# Patient Record
Sex: MALE | Race: WHITE | NOT HISPANIC OR LATINO
[De-identification: names, ages, dates, MRNs, and addresses within clinical notes are randomized per-mention and may not be internally consistent; named-entity substitution may affect disease eponyms.]

---

## 2017-02-27 ENCOUNTER — RX RENEWAL (OUTPATIENT)
Age: 82
End: 2017-02-27

## 2017-05-12 ENCOUNTER — RX RENEWAL (OUTPATIENT)
Age: 82
End: 2017-05-12

## 2017-07-17 ENCOUNTER — LABORATORY RESULT (OUTPATIENT)
Age: 82
End: 2017-07-17

## 2017-07-17 ENCOUNTER — APPOINTMENT (OUTPATIENT)
Dept: CARDIOLOGY | Facility: CLINIC | Age: 82
End: 2017-07-17

## 2017-07-17 VITALS
HEART RATE: 78 BPM | DIASTOLIC BLOOD PRESSURE: 80 MMHG | WEIGHT: 145 LBS | BODY MASS INDEX: 22.71 KG/M2 | SYSTOLIC BLOOD PRESSURE: 142 MMHG | OXYGEN SATURATION: 98 %

## 2017-07-18 ENCOUNTER — NON-APPOINTMENT (OUTPATIENT)
Age: 82
End: 2017-07-18

## 2017-07-23 LAB
ALBUMIN SERPL ELPH-MCNC: 3.7 G/DL
ALP BLD-CCNC: 86 U/L
ALT SERPL-CCNC: 17 U/L
ANION GAP SERPL CALC-SCNC: 18 MMOL/L
AST SERPL-CCNC: 36 U/L
BASOPHILS # BLD AUTO: 0.03 K/UL
BASOPHILS NFR BLD AUTO: 0.5 %
BILIRUB SERPL-MCNC: 1.7 MG/DL
BUN SERPL-MCNC: 15 MG/DL
CALCIUM SERPL-MCNC: 9.6 MG/DL
CHLORIDE SERPL-SCNC: 95 MMOL/L
CO2 SERPL-SCNC: 25 MMOL/L
CREAT SERPL-MCNC: 0.97 MG/DL
EOSINOPHIL # BLD AUTO: 0.05 K/UL
EOSINOPHIL NFR BLD AUTO: 0.8 %
FT4I SERPL CALC-MCNC: 7.7 INDEX
GLUCOSE SERPL-MCNC: 52 MG/DL
HBA1C MFR BLD HPLC: 5.6 %
HCT VFR BLD CALC: 41.1 %
HGB BLD-MCNC: 12.8 G/DL
IMM GRANULOCYTES NFR BLD AUTO: 1.3 %
LYMPHOCYTES # BLD AUTO: 0.56 K/UL
LYMPHOCYTES NFR BLD AUTO: 9.3 %
MAN DIFF?: NORMAL
MCHC RBC-ENTMCNC: 29.1 PG
MCHC RBC-ENTMCNC: 31.1 GM/DL
MCV RBC AUTO: 93.4 FL
MONOCYTES # BLD AUTO: 0.6 K/UL
MONOCYTES NFR BLD AUTO: 9.9 %
NEUTROPHILS # BLD AUTO: 4.72 K/UL
NEUTROPHILS NFR BLD AUTO: 78.2 %
NT-PROBNP SERPL-MCNC: 1329 PG/ML
PLATELET # BLD AUTO: 96 K/UL
POTASSIUM SERPL-SCNC: 4.3 MMOL/L
PROT SERPL-MCNC: 6 G/DL
RBC # BLD: 4.4 M/UL
RBC # FLD: 15.4 %
SODIUM SERPL-SCNC: 138 MMOL/L
T3RU NFR SERPL: 0.97 INDEX
T4 FREE SERPL-MCNC: 1.4 NG/DL
T4 SERPL-MCNC: 7.5 UG/DL
TSH SERPL-ACNC: 5.7 UIU/ML
WBC # FLD AUTO: 6.04 K/UL

## 2017-08-02 ENCOUNTER — RX RENEWAL (OUTPATIENT)
Age: 82
End: 2017-08-02

## 2017-08-06 ENCOUNTER — RX RENEWAL (OUTPATIENT)
Age: 82
End: 2017-08-06

## 2017-08-07 ENCOUNTER — RX RENEWAL (OUTPATIENT)
Age: 82
End: 2017-08-07

## 2017-08-22 ENCOUNTER — MEDICATION RENEWAL (OUTPATIENT)
Age: 82
End: 2017-08-22

## 2017-08-25 ENCOUNTER — MEDICATION RENEWAL (OUTPATIENT)
Age: 82
End: 2017-08-25

## 2017-08-31 ENCOUNTER — APPOINTMENT (OUTPATIENT)
Dept: UROLOGY | Facility: CLINIC | Age: 82
End: 2017-08-31
Payer: MEDICARE

## 2017-08-31 PROCEDURE — 99214 OFFICE O/P EST MOD 30 MIN: CPT

## 2017-09-01 LAB
APPEARANCE: CLEAR
BACTERIA: NEGATIVE
BILIRUBIN URINE: NEGATIVE
BLOOD URINE: ABNORMAL
COLOR: YELLOW
GLUCOSE QUALITATIVE U: NORMAL MG/DL
HYALINE CASTS: 1 /LPF
KETONES URINE: NEGATIVE
LEUKOCYTE ESTERASE URINE: NEGATIVE
MICROSCOPIC-UA: NORMAL
NITRITE URINE: NEGATIVE
PH URINE: 6.5
PROTEIN URINE: 30 MG/DL
PSA FREE FLD-MCNC: 33.3
PSA FREE SERPL-MCNC: 0.03 NG/ML
PSA SERPL-MCNC: 0.09 NG/ML
RED BLOOD CELLS URINE: 6 /HPF
SPECIFIC GRAVITY URINE: 1.02
SQUAMOUS EPITHELIAL CELLS: 0 /HPF
UROBILINOGEN URINE: 1 MG/DL
WHITE BLOOD CELLS URINE: 1 /HPF

## 2017-09-05 LAB — CORE LAB FLUID CYTOLOGY: NORMAL

## 2017-11-01 ENCOUNTER — RX RENEWAL (OUTPATIENT)
Age: 82
End: 2017-11-01

## 2017-11-11 ENCOUNTER — RX RENEWAL (OUTPATIENT)
Age: 82
End: 2017-11-11

## 2017-11-12 ENCOUNTER — RX RENEWAL (OUTPATIENT)
Age: 82
End: 2017-11-12

## 2017-11-20 ENCOUNTER — APPOINTMENT (OUTPATIENT)
Dept: CARDIOLOGY | Facility: CLINIC | Age: 82
End: 2017-11-20
Payer: MEDICARE

## 2017-11-20 ENCOUNTER — NON-APPOINTMENT (OUTPATIENT)
Age: 82
End: 2017-11-20

## 2017-11-20 VITALS
DIASTOLIC BLOOD PRESSURE: 90 MMHG | WEIGHT: 141 LBS | HEART RATE: 91 BPM | TEMPERATURE: 97.6 F | HEIGHT: 67 IN | BODY MASS INDEX: 22.13 KG/M2 | OXYGEN SATURATION: 92 % | SYSTOLIC BLOOD PRESSURE: 155 MMHG

## 2017-11-20 DIAGNOSIS — R26.0 ATAXIC GAIT: ICD-10-CM

## 2017-11-20 PROCEDURE — 99215 OFFICE O/P EST HI 40 MIN: CPT

## 2017-11-20 PROCEDURE — 93288 INTERROG EVL PM/LDLS PM IP: CPT

## 2017-12-05 ENCOUNTER — MEDICATION RENEWAL (OUTPATIENT)
Age: 82
End: 2017-12-05

## 2018-02-21 ENCOUNTER — RX RENEWAL (OUTPATIENT)
Age: 83
End: 2018-02-21

## 2018-04-11 ENCOUNTER — RX RENEWAL (OUTPATIENT)
Age: 83
End: 2018-04-11

## 2018-04-20 ENCOUNTER — APPOINTMENT (OUTPATIENT)
Dept: CARDIOLOGY | Facility: CLINIC | Age: 83
End: 2018-04-20

## 2018-06-03 ENCOUNTER — RX RENEWAL (OUTPATIENT)
Age: 83
End: 2018-06-03

## 2018-07-09 ENCOUNTER — MOBILE ON CALL (OUTPATIENT)
Age: 83
End: 2018-07-09

## 2018-08-02 ENCOUNTER — APPOINTMENT (OUTPATIENT)
Dept: CARDIOLOGY | Facility: CLINIC | Age: 83
End: 2018-08-02
Payer: MEDICARE

## 2018-08-02 ENCOUNTER — NON-APPOINTMENT (OUTPATIENT)
Age: 83
End: 2018-08-02

## 2018-08-02 VITALS — DIASTOLIC BLOOD PRESSURE: 80 MMHG | WEIGHT: 147 LBS | SYSTOLIC BLOOD PRESSURE: 120 MMHG | BODY MASS INDEX: 23.02 KG/M2

## 2018-08-02 DIAGNOSIS — M10.9 GOUT, UNSPECIFIED: ICD-10-CM

## 2018-08-02 DIAGNOSIS — R60.0 LOCALIZED EDEMA: ICD-10-CM

## 2018-08-02 PROCEDURE — 93000 ELECTROCARDIOGRAM COMPLETE: CPT | Mod: 59

## 2018-08-02 PROCEDURE — 93288 INTERROG EVL PM/LDLS PM IP: CPT

## 2018-08-02 PROCEDURE — 99215 OFFICE O/P EST HI 40 MIN: CPT

## 2018-08-02 RX ORDER — SACUBITRIL AND VALSARTAN 24; 26 MG/1; MG/1
24-26 TABLET, FILM COATED ORAL
Qty: 180 | Refills: 0 | Status: DISCONTINUED | COMMUNITY
Start: 2018-07-09 | End: 2018-08-02

## 2018-08-14 ENCOUNTER — APPOINTMENT (OUTPATIENT)
Dept: CARDIOLOGY | Facility: CLINIC | Age: 83
End: 2018-08-14
Payer: MEDICARE

## 2018-08-14 VITALS — DIASTOLIC BLOOD PRESSURE: 83 MMHG | SYSTOLIC BLOOD PRESSURE: 137 MMHG | OXYGEN SATURATION: 98 % | HEART RATE: 82 BPM

## 2018-08-14 LAB — INR PPP: 3.2 RATIO

## 2018-08-14 PROCEDURE — 99211 OFF/OP EST MAY X REQ PHY/QHP: CPT

## 2018-08-14 PROCEDURE — 85610 PROTHROMBIN TIME: CPT | Mod: QW

## 2018-08-21 ENCOUNTER — RX RENEWAL (OUTPATIENT)
Age: 83
End: 2018-08-21

## 2018-09-18 ENCOUNTER — APPOINTMENT (OUTPATIENT)
Dept: CARDIOLOGY | Facility: CLINIC | Age: 83
End: 2018-09-18
Payer: MEDICARE

## 2018-09-18 ENCOUNTER — NON-APPOINTMENT (OUTPATIENT)
Age: 83
End: 2018-09-18

## 2018-09-18 VITALS
HEIGHT: 67 IN | TEMPERATURE: 98.3 F | HEART RATE: 85 BPM | WEIGHT: 145.2 LBS | OXYGEN SATURATION: 93 % | DIASTOLIC BLOOD PRESSURE: 83 MMHG | SYSTOLIC BLOOD PRESSURE: 138 MMHG | BODY MASS INDEX: 22.79 KG/M2

## 2018-09-18 DIAGNOSIS — I87.2 VENOUS INSUFFICIENCY (CHRONIC) (PERIPHERAL): ICD-10-CM

## 2018-09-18 PROCEDURE — 99215 OFFICE O/P EST HI 40 MIN: CPT

## 2018-09-18 PROCEDURE — 93000 ELECTROCARDIOGRAM COMPLETE: CPT

## 2018-09-27 ENCOUNTER — LABORATORY RESULT (OUTPATIENT)
Age: 83
End: 2018-09-27

## 2018-09-27 ENCOUNTER — APPOINTMENT (OUTPATIENT)
Dept: CARDIOLOGY | Facility: CLINIC | Age: 83
End: 2018-09-27
Payer: MEDICARE

## 2018-09-27 DIAGNOSIS — M25.511 PAIN IN RIGHT SHOULDER: ICD-10-CM

## 2018-09-27 PROCEDURE — 93306 TTE W/DOPPLER COMPLETE: CPT

## 2018-09-28 LAB
ALBUMIN SERPL ELPH-MCNC: 4.3 G/DL
ALP BLD-CCNC: 108 U/L
ALT SERPL-CCNC: 19 U/L
ANION GAP SERPL CALC-SCNC: 15 MMOL/L
AST SERPL-CCNC: 25 U/L
BASOPHILS # BLD AUTO: 0.02 K/UL
BASOPHILS NFR BLD AUTO: 0.3 %
BILIRUB SERPL-MCNC: 3.9 MG/DL
BUN SERPL-MCNC: 22 MG/DL
CALCIUM SERPL-MCNC: 9.6 MG/DL
CHLORIDE SERPL-SCNC: 98 MMOL/L
CHOLEST SERPL-MCNC: 138 MG/DL
CHOLEST/HDLC SERPL: 2.5 RATIO
CO2 SERPL-SCNC: 26 MMOL/L
CREAT SERPL-MCNC: 0.99 MG/DL
EOSINOPHIL # BLD AUTO: 0.03 K/UL
EOSINOPHIL NFR BLD AUTO: 0.4 %
FT4I SERPL CALC-MCNC: 8.3 INDEX
GLUCOSE SERPL-MCNC: 106 MG/DL
HBA1C MFR BLD HPLC: 5.8 %
HCT VFR BLD CALC: 42.6 %
HDLC SERPL-MCNC: 56 MG/DL
HGB BLD-MCNC: 13.4 G/DL
IMM GRANULOCYTES NFR BLD AUTO: 1.3 %
LDLC SERPL CALC-MCNC: 63 MG/DL
LYMPHOCYTES # BLD AUTO: 1.01 K/UL
LYMPHOCYTES NFR BLD AUTO: 14.1 %
MAN DIFF?: NORMAL
MCHC RBC-ENTMCNC: 29.5 PG
MCHC RBC-ENTMCNC: 31.5 GM/DL
MCV RBC AUTO: 93.6 FL
MONOCYTES # BLD AUTO: 0.58 K/UL
MONOCYTES NFR BLD AUTO: 8.1 %
NEUTROPHILS # BLD AUTO: 5.44 K/UL
NEUTROPHILS NFR BLD AUTO: 75.8 %
PLATELET # BLD AUTO: 82 K/UL
POTASSIUM SERPL-SCNC: 4.7 MMOL/L
PROT SERPL-MCNC: 6.6 G/DL
RBC # BLD: 4.55 M/UL
RBC # FLD: 15.4 %
SODIUM SERPL-SCNC: 139 MMOL/L
T3RU NFR SERPL: 0.96 INDEX
T4 FREE SERPL-MCNC: 1.6 NG/DL
T4 SERPL-MCNC: 8 UG/DL
TRIGL SERPL-MCNC: 94 MG/DL
TSH SERPL-ACNC: 4.34 UIU/ML
WBC # FLD AUTO: 7.17 K/UL

## 2018-09-29 ENCOUNTER — RX RENEWAL (OUTPATIENT)
Age: 83
End: 2018-09-29

## 2018-10-02 ENCOUNTER — RX RENEWAL (OUTPATIENT)
Age: 83
End: 2018-10-02

## 2018-11-18 ENCOUNTER — RX RENEWAL (OUTPATIENT)
Age: 83
End: 2018-11-18

## 2018-11-19 ENCOUNTER — APPOINTMENT (OUTPATIENT)
Dept: UROLOGY | Facility: CLINIC | Age: 83
End: 2018-11-19
Payer: MEDICARE

## 2018-11-19 DIAGNOSIS — N40.1 BENIGN PROSTATIC HYPERPLASIA WITH LOWER URINARY TRACT SYMPMS: ICD-10-CM

## 2018-11-19 DIAGNOSIS — N13.8 BENIGN PROSTATIC HYPERPLASIA WITH LOWER URINARY TRACT SYMPMS: ICD-10-CM

## 2018-11-19 DIAGNOSIS — R35.0 FREQUENCY OF MICTURITION: ICD-10-CM

## 2018-11-19 PROCEDURE — 99213 OFFICE O/P EST LOW 20 MIN: CPT

## 2018-11-20 LAB
APPEARANCE: CLEAR
BACTERIA: NEGATIVE
BILIRUBIN URINE: NEGATIVE
BLOOD URINE: ABNORMAL
COLOR: YELLOW
GLUCOSE QUALITATIVE U: NEGATIVE MG/DL
HYALINE CASTS: 0 /LPF
KETONES URINE: NEGATIVE
LEUKOCYTE ESTERASE URINE: NEGATIVE
MICROSCOPIC-UA: NORMAL
NITRITE URINE: NEGATIVE
PH URINE: 7
PROTEIN URINE: 30 MG/DL
RED BLOOD CELLS URINE: 4 /HPF
SPECIFIC GRAVITY URINE: 1.01
SQUAMOUS EPITHELIAL CELLS: 0 /HPF
UROBILINOGEN URINE: NEGATIVE MG/DL
WHITE BLOOD CELLS URINE: 0 /HPF

## 2018-11-21 ENCOUNTER — APPOINTMENT (OUTPATIENT)
Dept: CARDIOLOGY | Facility: CLINIC | Age: 83
End: 2018-11-21
Payer: MEDICARE

## 2018-11-21 ENCOUNTER — NON-APPOINTMENT (OUTPATIENT)
Age: 83
End: 2018-11-21

## 2018-11-21 VITALS
DIASTOLIC BLOOD PRESSURE: 70 MMHG | SYSTOLIC BLOOD PRESSURE: 116 MMHG | WEIGHT: 142 LBS | HEART RATE: 90 BPM | HEIGHT: 67 IN | BODY MASS INDEX: 22.29 KG/M2 | OXYGEN SATURATION: 93 %

## 2018-11-21 DIAGNOSIS — D69.6 THROMBOCYTOPENIA, UNSPECIFIED: ICD-10-CM

## 2018-11-21 LAB
CORE LAB FLUID CYTOLOGY: NORMAL
INR PPP: 2.4 RATIO

## 2018-11-21 PROCEDURE — 99215 OFFICE O/P EST HI 40 MIN: CPT

## 2018-11-21 PROCEDURE — 93000 ELECTROCARDIOGRAM COMPLETE: CPT

## 2018-12-13 ENCOUNTER — RX RENEWAL (OUTPATIENT)
Age: 83
End: 2018-12-13

## 2019-01-15 ENCOUNTER — RX RENEWAL (OUTPATIENT)
Age: 84
End: 2019-01-15

## 2019-02-24 ENCOUNTER — RX RENEWAL (OUTPATIENT)
Age: 84
End: 2019-02-24

## 2019-02-27 ENCOUNTER — RX RENEWAL (OUTPATIENT)
Age: 84
End: 2019-02-27

## 2019-05-30 ENCOUNTER — RX RENEWAL (OUTPATIENT)
Age: 84
End: 2019-05-30

## 2019-07-19 ENCOUNTER — NON-APPOINTMENT (OUTPATIENT)
Age: 84
End: 2019-07-19

## 2019-07-19 ENCOUNTER — APPOINTMENT (OUTPATIENT)
Dept: CARDIOLOGY | Facility: CLINIC | Age: 84
End: 2019-07-19
Payer: MEDICARE

## 2019-07-19 VITALS
HEART RATE: 75 BPM | RESPIRATION RATE: 17 BRPM | DIASTOLIC BLOOD PRESSURE: 84 MMHG | TEMPERATURE: 97.9 F | SYSTOLIC BLOOD PRESSURE: 122 MMHG | HEIGHT: 67 IN | BODY MASS INDEX: 23.07 KG/M2 | OXYGEN SATURATION: 98 % | WEIGHT: 147 LBS

## 2019-07-19 DIAGNOSIS — J90 PLEURAL EFFUSION, NOT ELSEWHERE CLASSIFIED: ICD-10-CM

## 2019-07-19 PROCEDURE — 93000 ELECTROCARDIOGRAM COMPLETE: CPT

## 2019-07-19 PROCEDURE — 99215 OFFICE O/P EST HI 40 MIN: CPT

## 2019-07-19 RX ORDER — DIGOXIN 250 UG/1
250 TABLET ORAL
Qty: 90 | Refills: 0 | Status: DISCONTINUED | COMMUNITY
Start: 2018-06-03 | End: 2019-07-19

## 2019-07-20 ENCOUNTER — OUTPATIENT (OUTPATIENT)
Dept: OUTPATIENT SERVICES | Facility: HOSPITAL | Age: 84
LOS: 1 days | End: 2019-07-20
Payer: MEDICARE

## 2019-07-20 ENCOUNTER — APPOINTMENT (OUTPATIENT)
Dept: RADIOLOGY | Facility: CLINIC | Age: 84
End: 2019-07-20
Payer: MEDICARE

## 2019-07-20 DIAGNOSIS — Z00.8 ENCOUNTER FOR OTHER GENERAL EXAMINATION: ICD-10-CM

## 2019-07-20 PROCEDURE — 71046 X-RAY EXAM CHEST 2 VIEWS: CPT

## 2019-07-20 PROCEDURE — 71046 X-RAY EXAM CHEST 2 VIEWS: CPT | Mod: 26

## 2019-07-21 ENCOUNTER — RX RENEWAL (OUTPATIENT)
Age: 84
End: 2019-07-21

## 2019-07-21 RX ORDER — SACUBITRIL AND VALSARTAN 24; 26 MG/1; MG/1
24-26 TABLET, FILM COATED ORAL TWICE DAILY
Qty: 180 | Refills: 3 | Status: ACTIVE | COMMUNITY
Start: 2017-07-23 | End: 1900-01-01

## 2019-07-23 ENCOUNTER — APPOINTMENT (OUTPATIENT)
Dept: CARDIOLOGY | Facility: CLINIC | Age: 84
End: 2019-07-23
Payer: MEDICARE

## 2019-07-23 ENCOUNTER — APPOINTMENT (OUTPATIENT)
Dept: RADIOLOGY | Facility: CLINIC | Age: 84
End: 2019-07-23
Payer: MEDICARE

## 2019-07-23 ENCOUNTER — NON-APPOINTMENT (OUTPATIENT)
Age: 84
End: 2019-07-23

## 2019-07-23 ENCOUNTER — OUTPATIENT (OUTPATIENT)
Dept: OUTPATIENT SERVICES | Facility: HOSPITAL | Age: 84
LOS: 1 days | End: 2019-07-23
Payer: MEDICARE

## 2019-07-23 VITALS
DIASTOLIC BLOOD PRESSURE: 80 MMHG | BODY MASS INDEX: 22.91 KG/M2 | WEIGHT: 146 LBS | HEART RATE: 90 BPM | SYSTOLIC BLOOD PRESSURE: 130 MMHG | OXYGEN SATURATION: 95 % | HEIGHT: 67 IN

## 2019-07-23 DIAGNOSIS — M25.552 PAIN IN LEFT HIP: ICD-10-CM

## 2019-07-23 DIAGNOSIS — I34.0 NONRHEUMATIC MITRAL (VALVE) INSUFFICIENCY: ICD-10-CM

## 2019-07-23 PROCEDURE — 73502 X-RAY EXAM HIP UNI 2-3 VIEWS: CPT

## 2019-07-23 PROCEDURE — 99215 OFFICE O/P EST HI 40 MIN: CPT

## 2019-07-23 PROCEDURE — 93000 ELECTROCARDIOGRAM COMPLETE: CPT

## 2019-07-23 PROCEDURE — 73502 X-RAY EXAM HIP UNI 2-3 VIEWS: CPT | Mod: 26,LT

## 2019-08-02 ENCOUNTER — NON-APPOINTMENT (OUTPATIENT)
Age: 84
End: 2019-08-02

## 2019-08-02 ENCOUNTER — LABORATORY RESULT (OUTPATIENT)
Age: 84
End: 2019-08-02

## 2019-08-02 ENCOUNTER — APPOINTMENT (OUTPATIENT)
Dept: CARDIOLOGY | Facility: CLINIC | Age: 84
End: 2019-08-02
Payer: MEDICARE

## 2019-08-02 VITALS
SYSTOLIC BLOOD PRESSURE: 126 MMHG | WEIGHT: 140 LBS | BODY MASS INDEX: 21.97 KG/M2 | HEART RATE: 84 BPM | DIASTOLIC BLOOD PRESSURE: 80 MMHG | OXYGEN SATURATION: 96 % | HEIGHT: 67 IN

## 2019-08-02 DIAGNOSIS — E78.5 HYPERLIPIDEMIA, UNSPECIFIED: ICD-10-CM

## 2019-08-02 DIAGNOSIS — R97.20 ELEVATED PROSTATE, SPECIFIC ANTIGEN [PSA]: ICD-10-CM

## 2019-08-02 DIAGNOSIS — I10 ESSENTIAL (PRIMARY) HYPERTENSION: ICD-10-CM

## 2019-08-02 PROCEDURE — 99215 OFFICE O/P EST HI 40 MIN: CPT

## 2019-08-04 ENCOUNTER — MOBILE ON CALL (OUTPATIENT)
Age: 84
End: 2019-08-04

## 2019-08-04 LAB
ALBUMIN SERPL ELPH-MCNC: 4.3 G/DL
ALP BLD-CCNC: 130 U/L
ALT SERPL-CCNC: 18 U/L
ANION GAP SERPL CALC-SCNC: 15 MMOL/L
AST SERPL-CCNC: 25 U/L
BASOPHILS # BLD AUTO: 0.04 K/UL
BASOPHILS NFR BLD AUTO: 0.7 %
BILIRUB SERPL-MCNC: 1.8 MG/DL
BUN SERPL-MCNC: 20 MG/DL
CALCIUM SERPL-MCNC: 9.6 MG/DL
CHLORIDE SERPL-SCNC: 94 MMOL/L
CHOLEST SERPL-MCNC: 115 MG/DL
CHOLEST/HDLC SERPL: 2.8 RATIO
CO2 SERPL-SCNC: 28 MMOL/L
CREAT SERPL-MCNC: 0.91 MG/DL
DIGOXIN SERPL-MCNC: 3.3 NG/ML
EOSINOPHIL # BLD AUTO: 0.04 K/UL
EOSINOPHIL NFR BLD AUTO: 0.7 %
ESTIMATED AVERAGE GLUCOSE: 123 MG/DL
FT4I SERPL CALC-MCNC: 8.3 INDEX
GLUCOSE SERPL-MCNC: 136 MG/DL
HBA1C MFR BLD HPLC: 5.9 %
HCT VFR BLD CALC: 39.9 %
HDLC SERPL-MCNC: 41 MG/DL
HGB BLD-MCNC: 12.5 G/DL
IMM GRANULOCYTES NFR BLD AUTO: 1 %
LDLC SERPL CALC-MCNC: 52 MG/DL
LYMPHOCYTES # BLD AUTO: 0.46 K/UL
LYMPHOCYTES NFR BLD AUTO: 7.8 %
MAN DIFF?: NORMAL
MCHC RBC-ENTMCNC: 29.3 PG
MCHC RBC-ENTMCNC: 31.3 GM/DL
MCV RBC AUTO: 93.7 FL
MONOCYTES # BLD AUTO: 0.79 K/UL
MONOCYTES NFR BLD AUTO: 13.4 %
NEUTROPHILS # BLD AUTO: 4.51 K/UL
NEUTROPHILS NFR BLD AUTO: 76.4 %
PLATELET # BLD AUTO: 124 K/UL
POTASSIUM SERPL-SCNC: 4.3 MMOL/L
PROT SERPL-MCNC: 6.7 G/DL
PSA FREE FLD-MCNC: NORMAL %
PSA FREE SERPL-MCNC: <0.01 NG/ML
PSA SERPL-MCNC: 0.09 NG/ML
RBC # BLD: 4.26 M/UL
RBC # FLD: 14.6 %
SODIUM SERPL-SCNC: 137 MMOL/L
T3RU NFR SERPL: 0.9 TBI
T4 FREE SERPL-MCNC: 1.6 NG/DL
T4 SERPL-MCNC: 7.6 UG/DL
TRIGL SERPL-MCNC: 112 MG/DL
TSH SERPL-ACNC: 3.72 UIU/ML
WBC # FLD AUTO: 5.9 K/UL

## 2019-08-04 RX ORDER — DIGOXIN 250 UG/1
250 TABLET ORAL
Qty: 90 | Refills: 0 | Status: DISCONTINUED | COMMUNITY
Start: 2018-09-29 | End: 2019-08-04

## 2019-08-06 ENCOUNTER — NON-APPOINTMENT (OUTPATIENT)
Age: 84
End: 2019-08-06

## 2019-08-06 ENCOUNTER — APPOINTMENT (OUTPATIENT)
Dept: ELECTROPHYSIOLOGY | Facility: CLINIC | Age: 84
End: 2019-08-06
Payer: MEDICARE

## 2019-08-06 VITALS
HEART RATE: 80 BPM | HEIGHT: 67 IN | BODY MASS INDEX: 21.66 KG/M2 | SYSTOLIC BLOOD PRESSURE: 146 MMHG | WEIGHT: 138 LBS | OXYGEN SATURATION: 97 % | DIASTOLIC BLOOD PRESSURE: 71 MMHG

## 2019-08-06 DIAGNOSIS — R06.02 SHORTNESS OF BREATH: ICD-10-CM

## 2019-08-06 PROCEDURE — 99205 OFFICE O/P NEW HI 60 MIN: CPT

## 2019-08-06 PROCEDURE — 93000 ELECTROCARDIOGRAM COMPLETE: CPT

## 2019-08-06 RX ORDER — BUDESONIDE AND FORMOTEROL FUMARATE DIHYDRATE 160; 4.5 UG/1; UG/1
160-4.5 AEROSOL RESPIRATORY (INHALATION)
Qty: 10.2 | Refills: 2 | Status: DISCONTINUED | COMMUNITY
Start: 2018-08-21 | End: 2019-08-06

## 2019-08-06 RX ORDER — ATORVASTATIN CALCIUM 10 MG/1
10 TABLET, FILM COATED ORAL
Qty: 90 | Refills: 0 | Status: DISCONTINUED | COMMUNITY
Start: 2019-08-04 | End: 2019-08-06

## 2019-08-06 NOTE — DISCUSSION/SUMMARY
[FreeTextEntry1] : Plan for biv upgrade.  I cautioned him about expectations for improvement.  We also discussed the risks of the procedure.  He is very fragile and with his elevated CHADS2 and mechanical valve we will do this on oral AC.   He will continue to follow his INR.  All of his questions were answered and the procedure will be arranged.

## 2019-08-06 NOTE — HISTORY OF PRESENT ILLNESS
[FreeTextEntry1] : 86 year old man with a history of valvular heart disease (s/p AVR (M) and MV repair 1999), chronic atrial fibrillation. No history of TE complication.  His evaluation today is prompted by clinical deterioration, ie worsening HF.  He has been able to avoid hospitalization.  4 - 5 years ago he was playing golf and had no limitations.  Over the past 1 -2 years he really appreciates the deterioration.  No lightheadedness/dizziness.  No syncope.  No chest pain.  No orthopnea.  No PND.  He has noted LE edema, but this has responded to increased Lasix.  He follows his INR at home and is typically between 2.5 and 3.  NO bleeding complications.

## 2019-08-06 NOTE — PHYSICAL EXAM
[Well Groomed] : well groomed [General Appearance - In No Acute Distress] : no acute distress [Normal Conjunctiva] : the conjunctiva exhibited no abnormalities [Eyelids - No Xanthelasma] : the eyelids demonstrated no xanthelasmas [Normal Oral Mucosa] : normal oral mucosa [No Oral Pallor] : no oral pallor [No Oral Cyanosis] : no oral cyanosis [Normal Jugular Venous A Waves Present] : normal jugular venous A waves present [Normal Jugular Venous V Waves Present] : normal jugular venous V waves present [No Jugular Venous Joseph A Waves] : no jugular venous joseph A waves [Arterial Pulses Normal] : the arterial pulses were normal [Systolic grade ___/6] : A grade [unfilled]/6 systolic murmur was heard. [Diastolic Grade ___/4] : A grade [unfilled]/4 diastolic murmur was heard. [Respiration, Rhythm And Depth] : normal respiratory rhythm and effort [Exaggerated Use Of Accessory Muscles For Inspiration] : no accessory muscle use [Auscultation Breath Sounds / Voice Sounds] : lungs were clear to auscultation bilaterally [Abdomen Soft] : soft [Abdomen Tenderness] : non-tender [Abdomen Mass (___ Cm)] : no abdominal mass palpated [FreeTextEntry1] : walks with a cane [Nail Clubbing] : no clubbing of the fingernails [Cyanosis, Localized] : no localized cyanosis [Petechial Hemorrhages (___cm)] : no petechial hemorrhages [Skin Color & Pigmentation] : normal skin color and pigmentation [] : no rash [No Venous Stasis] : no venous stasis [Skin Lesions] : no skin lesions [No Skin Ulcers] : no skin ulcer [No Xanthoma] : no  xanthoma was observed [Oriented To Time, Place, And Person] : oriented to person, place, and time [Affect] : the affect was normal [Mood] : the mood was normal [No Anxiety] : not feeling anxious

## 2019-08-07 ENCOUNTER — RX RENEWAL (OUTPATIENT)
Age: 84
End: 2019-08-07

## 2019-09-12 ENCOUNTER — APPOINTMENT (OUTPATIENT)
Dept: CARDIOLOGY | Facility: CLINIC | Age: 84
End: 2019-09-12
Payer: MEDICARE

## 2019-09-12 LAB — INR PPP: 2.6 RATIO

## 2019-09-12 PROCEDURE — 93793 ANTICOAG MGMT PT WARFARIN: CPT

## 2019-09-12 PROCEDURE — 85610 PROTHROMBIN TIME: CPT | Mod: QW

## 2019-09-13 ENCOUNTER — TRANSCRIPTION ENCOUNTER (OUTPATIENT)
Age: 84
End: 2019-09-13

## 2019-09-13 ENCOUNTER — INPATIENT (INPATIENT)
Facility: HOSPITAL | Age: 84
LOS: 0 days | Discharge: HOME | DRG: 244 | End: 2019-09-14
Attending: INTERNAL MEDICINE | Admitting: INTERNAL MEDICINE
Payer: COMMERCIAL

## 2019-09-13 VITALS
RESPIRATION RATE: 14 BRPM | HEART RATE: 85 BPM | TEMPERATURE: 98 F | OXYGEN SATURATION: 98 % | SYSTOLIC BLOOD PRESSURE: 154 MMHG | WEIGHT: 139.99 LBS | DIASTOLIC BLOOD PRESSURE: 75 MMHG | HEIGHT: 66 IN

## 2019-09-13 DIAGNOSIS — I50.9 HEART FAILURE, UNSPECIFIED: ICD-10-CM

## 2019-09-13 LAB
ALBUMIN SERPL ELPH-MCNC: 4 G/DL — SIGNIFICANT CHANGE UP (ref 3.3–5)
ALP SERPL-CCNC: 121 U/L — HIGH (ref 40–120)
ALT FLD-CCNC: 42 U/L — SIGNIFICANT CHANGE UP (ref 10–45)
ANION GAP SERPL CALC-SCNC: 9 MMOL/L — SIGNIFICANT CHANGE UP (ref 5–17)
ANION GAP SERPL CALC-SCNC: 9 MMOL/L — SIGNIFICANT CHANGE UP (ref 5–17)
APTT BLD: 42.8 SEC — HIGH (ref 27.5–36.3)
AST SERPL-CCNC: 153 U/L — HIGH (ref 10–40)
BILIRUB SERPL-MCNC: 1.7 MG/DL — HIGH (ref 0.2–1.2)
BLD GP AB SCN SERPL QL: NEGATIVE — SIGNIFICANT CHANGE UP
BUN SERPL-MCNC: 28 MG/DL — HIGH (ref 7–23)
BUN SERPL-MCNC: 28 MG/DL — HIGH (ref 7–23)
CALCIUM SERPL-MCNC: 9.6 MG/DL — SIGNIFICANT CHANGE UP (ref 8.4–10.5)
CALCIUM SERPL-MCNC: 9.7 MG/DL — SIGNIFICANT CHANGE UP (ref 8.4–10.5)
CHLORIDE SERPL-SCNC: 95 MMOL/L — LOW (ref 96–108)
CHLORIDE SERPL-SCNC: 95 MMOL/L — LOW (ref 96–108)
CO2 SERPL-SCNC: 28 MMOL/L — SIGNIFICANT CHANGE UP (ref 22–31)
CO2 SERPL-SCNC: 29 MMOL/L — SIGNIFICANT CHANGE UP (ref 22–31)
CREAT SERPL-MCNC: 1.04 MG/DL — SIGNIFICANT CHANGE UP (ref 0.5–1.3)
CREAT SERPL-MCNC: 1.04 MG/DL — SIGNIFICANT CHANGE UP (ref 0.5–1.3)
GLUCOSE SERPL-MCNC: 110 MG/DL — HIGH (ref 70–99)
GLUCOSE SERPL-MCNC: 111 MG/DL — HIGH (ref 70–99)
HCT VFR BLD CALC: 39.9 % — SIGNIFICANT CHANGE UP (ref 39–50)
HGB BLD-MCNC: 12.4 G/DL — LOW (ref 13–17)
INR BLD: 2.36 RATIO — HIGH (ref 0.88–1.16)
MCHC RBC-ENTMCNC: 28.5 PG — SIGNIFICANT CHANGE UP (ref 27–34)
MCHC RBC-ENTMCNC: 31.2 GM/DL — LOW (ref 32–36)
MCV RBC AUTO: 91.4 FL — SIGNIFICANT CHANGE UP (ref 80–100)
PLATELET # BLD AUTO: 98 K/UL — LOW (ref 150–400)
POTASSIUM SERPL-MCNC: 4.3 MMOL/L — SIGNIFICANT CHANGE UP (ref 3.5–5.3)
POTASSIUM SERPL-MCNC: 7.7 MMOL/L — CRITICAL HIGH (ref 3.5–5.3)
POTASSIUM SERPL-SCNC: 4.3 MMOL/L — SIGNIFICANT CHANGE UP (ref 3.5–5.3)
POTASSIUM SERPL-SCNC: 7.7 MMOL/L — CRITICAL HIGH (ref 3.5–5.3)
PROT SERPL-MCNC: 7.5 G/DL — SIGNIFICANT CHANGE UP (ref 6–8.3)
PROTHROM AB SERPL-ACNC: 27.6 SEC — HIGH (ref 10–12.9)
RBC # BLD: 4.36 M/UL — SIGNIFICANT CHANGE UP (ref 4.2–5.8)
RBC # FLD: 13.9 % — SIGNIFICANT CHANGE UP (ref 10.3–14.5)
RH IG SCN BLD-IMP: POSITIVE — SIGNIFICANT CHANGE UP
SODIUM SERPL-SCNC: 132 MMOL/L — LOW (ref 135–145)
SODIUM SERPL-SCNC: 133 MMOL/L — LOW (ref 135–145)
WBC # BLD: 5 K/UL — SIGNIFICANT CHANGE UP (ref 3.8–10.5)
WBC # FLD AUTO: 5 K/UL — SIGNIFICANT CHANGE UP (ref 3.8–10.5)

## 2019-09-13 PROCEDURE — 33225 L VENTRIC PACING LEAD ADD-ON: CPT

## 2019-09-13 PROCEDURE — 33231 INSRT PULSE GEN W/MULT LEADS: CPT

## 2019-09-13 PROCEDURE — 93010 ELECTROCARDIOGRAM REPORT: CPT

## 2019-09-13 PROCEDURE — 93010 ELECTROCARDIOGRAM REPORT: CPT | Mod: 77

## 2019-09-13 RX ORDER — FUROSEMIDE 40 MG
40 TABLET ORAL DAILY
Refills: 0 | Status: DISCONTINUED | OUTPATIENT
Start: 2019-09-13 | End: 2019-09-14

## 2019-09-13 RX ORDER — SODIUM CHLORIDE 9 MG/ML
3 INJECTION INTRAMUSCULAR; INTRAVENOUS; SUBCUTANEOUS EVERY 8 HOURS
Refills: 0 | Status: DISCONTINUED | OUTPATIENT
Start: 2019-09-13 | End: 2019-09-14

## 2019-09-13 RX ORDER — CARVEDILOL PHOSPHATE 80 MG/1
6.25 CAPSULE, EXTENDED RELEASE ORAL EVERY 12 HOURS
Refills: 0 | Status: DISCONTINUED | OUTPATIENT
Start: 2019-09-13 | End: 2019-09-14

## 2019-09-13 RX ORDER — SACUBITRIL AND VALSARTAN 24; 26 MG/1; MG/1
1 TABLET, FILM COATED ORAL
Refills: 0 | Status: DISCONTINUED | OUTPATIENT
Start: 2019-09-13 | End: 2019-09-14

## 2019-09-13 RX ORDER — FINASTERIDE 5 MG/1
5 TABLET, FILM COATED ORAL DAILY
Refills: 0 | Status: DISCONTINUED | OUTPATIENT
Start: 2019-09-13 | End: 2019-09-14

## 2019-09-13 RX ORDER — ALLOPURINOL 300 MG
100 TABLET ORAL DAILY
Refills: 0 | Status: DISCONTINUED | OUTPATIENT
Start: 2019-09-13 | End: 2019-09-14

## 2019-09-13 RX ORDER — ATORVASTATIN CALCIUM 80 MG/1
10 TABLET, FILM COATED ORAL AT BEDTIME
Refills: 0 | Status: DISCONTINUED | OUTPATIENT
Start: 2019-09-13 | End: 2019-09-14

## 2019-09-13 RX ORDER — WARFARIN SODIUM 2.5 MG/1
4 TABLET ORAL ONCE
Refills: 0 | Status: COMPLETED | OUTPATIENT
Start: 2019-09-13 | End: 2019-09-13

## 2019-09-13 RX ORDER — CEFAZOLIN SODIUM 1 G
1000 VIAL (EA) INJECTION EVERY 8 HOURS
Refills: 0 | Status: COMPLETED | OUTPATIENT
Start: 2019-09-13 | End: 2019-09-14

## 2019-09-13 RX ADMIN — ATORVASTATIN CALCIUM 10 MILLIGRAM(S): 80 TABLET, FILM COATED ORAL at 22:16

## 2019-09-13 RX ADMIN — Medication 100 MILLIGRAM(S): at 19:45

## 2019-09-13 RX ADMIN — WARFARIN SODIUM 4 MILLIGRAM(S): 2.5 TABLET ORAL at 22:16

## 2019-09-13 RX ADMIN — SACUBITRIL AND VALSARTAN 1 TABLET(S): 24; 26 TABLET, FILM COATED ORAL at 18:31

## 2019-09-13 RX ADMIN — SODIUM CHLORIDE 3 MILLILITER(S): 9 INJECTION INTRAMUSCULAR; INTRAVENOUS; SUBCUTANEOUS at 19:48

## 2019-09-13 RX ADMIN — SODIUM CHLORIDE 3 MILLILITER(S): 9 INJECTION INTRAMUSCULAR; INTRAVENOUS; SUBCUTANEOUS at 15:00

## 2019-09-13 RX ADMIN — CARVEDILOL PHOSPHATE 6.25 MILLIGRAM(S): 80 CAPSULE, EXTENDED RELEASE ORAL at 18:30

## 2019-09-13 RX ADMIN — FINASTERIDE 5 MILLIGRAM(S): 5 TABLET, FILM COATED ORAL at 18:35

## 2019-09-13 NOTE — DISCHARGE NOTE PROVIDER - CARE PROVIDERS DIRECT ADDRESSES
[email or web address]

## 2019-09-13 NOTE — H&P CARDIOLOGY - HISTORY OF PRESENT ILLNESS
86 year old man with PMH of valvular heart disease (s/p AVR (M) and MV repair 1999) on coumadin INR goal 2.5 to 3, chronic atrial fibrillation. Patient reports 4 - 5 years ago he was playing golf and had no limitations. Over the past 1 -2 years he reports TRUONG mostly when ambulating up 1 FOS and imbalance and decrease in activity level. Denies lightheadedness/dizzines, chest pain or syncope. He follows his INR at home and is typically between 2.5 and 3. NO bleeding complications. Plan for biv upgrade. Will do procedure on oral AC.     patient reports INR on 9/12 INR 2.6 skipped last nights 4mg dose    cards: Dr. Mireles    Echo: 9/27/2018, Repaired MV with moderate to severe MR. Mechanical aortic valve prosthesis. Severe LAE. Normal LV internal dimensions and wall thicknesses. Paradoxical septal motion. Moderate to severe LV dysfunction (EF= 33%).

## 2019-09-13 NOTE — DISCHARGE NOTE PROVIDER - HOSPITAL COURSE
86 year old man with PMH of valvular heart disease (s/p AVR (M) and MV repair 1999) on coumadin INR goal 2.5 to 3, chronic atrial fibrillation. Patient reports 4 - 5 years ago he was playing golf and had no limitations. Over the past 1 -2 years he reports TRUONG mostly when ambulating up 1 FOS and imbalance and decrease in activity level. Denies lightheadedness/dizzines, chest pain or syncope. He follows his INR at home and is typically between 2.5 and 3. NO bleeding complications. Plan for biv upgrade. Will do procedure on oral AC.         patient reports INR on 9/12 INR 2.6 skipped last nights 4mg dose        cards: Dr. Mireles        Echo: 9/27/2018, Repaired MV with moderate to severe MR. Mechanical aortic valve prosthesis. Severe LAE. Normal LV internal dimensions and wall thicknesses. Paradoxical septal motion. Moderate to severe LV dysfunction (EF= 33%).         9/13 s/p CRT upgrade 86 year old man with PMH of valvular heart disease (s/p AVR (M) and MV repair 1999) on coumadin INR goal 2.5 to 3, chronic atrial fibrillation. Patient reports 4 - 5 years ago he was playing golf and had no limitations. Over the past 1 -2 years he reports TRUONG mostly when ambulating up 1 FOS and imbalance and decrease in activity level. Denies lightheadedness/dizzines, chest pain or syncope. He follows his INR at home and is typically between 2.5 and 3. NO bleeding complications. Plan for biv upgrade. Will do procedure on oral AC.         patient reports INR on 9/12 INR 2.6 skipped last nights 4mg dose        cards: Dr. Mireles        Echo: 9/27/2018, Repaired MV with moderate to severe MR. Mechanical aortic valve prosthesis. Severe LAE. Normal LV internal dimensions and wall thicknesses. Paradoxical septal motion. Moderate to severe LV dysfunction (EF= 33%).         9/13 s/p CRT upgrade with incision to Left chest wall with hematoma compressed with resolve.  CXR with no PTX and proper lead placement.  Pt with elevated AST; Bilirubin and Alk Phos.  Has had elevated Bili in the past he is presently asymptomatic of abdominal pain, N/V, he's anicteric and his urine is marie.  Will hold his lipitor, Allopurinol and no further Tylenol for now.  Reviewed with Dr. Pollock and d/w pt to f/u with his PCP for further workup.  Pt stable for d/c home with no further site complications and no tele events. 86 year old man with PMH of valvular heart disease (s/p AVR (M) and MV repair 1999) on coumadin INR goal 2.5 to 3, chronic atrial fibrillation. Patient reports 4 - 5 years ago he was playing golf and had no limitations. Over the past 1 -2 years he reports TRUONG mostly when ambulating up 1 FOS and imbalance and decrease in activity level. Denies lightheadedness/dizzines, chest pain or syncope. He follows his INR at home and is typically between 2.5 and 3. NO bleeding complications. Plan for biv upgrade. Will do procedure on oral AC.         patient reports INR on 9/12 INR 2.6 skipped last nights 4mg dose        cards: Dr. Mireles        Echo: 9/27/2018, Repaired MV with moderate to severe MR. Mechanical aortic valve prosthesis. Severe LAE. Normal LV internal dimensions and wall thicknesses. Paradoxical septal motion. Moderate to severe LV dysfunction (EF= 33%).         9/13 s/p CRT upgrade with incision to Left chest wall with hematoma compressed with resolve.  CXR with no PTX and proper lead placement.  Reviewed with Dr. Pollock pt stable for d/c home with no further site complications and no tele events.

## 2019-09-13 NOTE — DISCHARGE NOTE PROVIDER - NSDCCPCAREPLAN_GEN_ALL_CORE_FT
PRINCIPAL DISCHARGE DIAGNOSIS  Diagnosis: Chronic CHF  Assessment and Plan of Treatment: Take your medications as prescribed. Follow a low-salt, low salt, low cholesterol heart healthy diet. Weigh yourself every day and keep a record; call your doctor if you gain 2 pounds over one to two days or 5 pounds over three days. Get to or maintain a healthy weight; ask your heart failure team for referrals to a registered dietitian if needed. Avoid alcohol. Be active (check with your physician or cardiologist first). Find healthy ways to deal with stress, such as deep breathing, meditation, exercise, and doing hobbies that you enjoy. If you smoke, quit. (A resource to help you stop smoking is the [facility] "Hey, [employer]ighbor!" – phone number [phone].).      SECONDARY DISCHARGE DIAGNOSES  Diagnosis: Atrial fibrillation  Assessment and Plan of Treatment: Continue with your cardiologist and primary care MD. Continue your current medications. Call your physician for palpitations, feelings of rapid heart beat, lightheadedness, or dizziness. If you are on warfarin (Coumadin), cont to have INR checked with your PCP. Ask your nurse for written material about Coumadin and to provide patient education before discharge. if needed.    Diagnosis: HLD (hyperlipidemia)  Assessment and Plan of Treatment: Continue with your cholesterol medications. Eat a heart healthy diet that is low in saturated fats and salt, and includes whole grains, fruits, vegetables and lean protein; exercise regularly (consult with your physician or cardiologist first); maintain a heart healthy weight; if you smoke - quit (A resource to help you stop smoking is the [facility] "Hey, [employer]ighbor!" – phone number [phone].). Continue to follow with your primary physician or cardiologist.    Diagnosis: HTN (hypertension)  Assessment and Plan of Treatment: Continue with your blood pressure medications; eat a heart healthy diet with low salt diet; exercise regularly (consult with your physician or cardiologist first); maintain a heart healthy weight; if you smoke - quit (A resource to help you stop smoking is the [facility] "Hey, Neighbor!" – phone number [phone].); include healthy ways to manage stress. Continue to follow with your primary care physician or cardiologist. PRINCIPAL DISCHARGE DIAGNOSIS  Diagnosis: Status post biventricular pacemaker  Assessment and Plan of Treatment: your pacemaker can send an electrical signal to your heart when it is necessary  call your doctor if you feel dizzy, lightheaded, shortness of breath, confused, more tired, faint  you will follow up with your pacemaker doctor regularly to check your pacemaker  your pacemaker battery usually will last 5 - 8 years  avoid certain electric or magnetic equipment  if you cannot walk through metal detector, ask for hand security search  most of the time you will not be able to have MRI  follow directions  that you received about arm use and mobility and driving.  you make want to get a emergency medical bracelet telling peoply you have a pacemaker  tell any health care provider that you have a pacemaker      SECONDARY DISCHARGE DIAGNOSES  Diagnosis: Chronic CHF  Assessment and Plan of Treatment: Take your medications as prescribed. Follow a low-salt, low salt, low cholesterol heart healthy diet. Weigh yourself every day and keep a record; call your doctor if you gain 2 pounds over one to two days or 5 pounds over three days. Get to or maintain a healthy weight; ask your heart failure team for referrals to a registered dietitian if needed. Avoid alcohol. Be active (check with your physician or cardiologist first). Find healthy ways to deal with stress, such as deep breathing, meditation, exercise, and doing hobbies that you enjoy. If you smoke, quit. (A resource to help you stop smoking is the [facility] – phone number [phone].).    Diagnosis: Elevated liver enzymes  Assessment and Plan of Treatment: Your liver enzyme AST is elevated.  Please follow up with your PCP for further workup.  Some medications can worsen this.  Please hold off on taking your Allopurinol, Lipitor and Tylenol/Acetaminophen until your PCP tells you to restart.    Diagnosis: Atrial fibrillation  Assessment and Plan of Treatment: Continue with your cardiologist and primary care MD. Continue your current medications. Call your physician for palpitations, feelings of rapid heart beat, lightheadedness, or dizziness. If you are on warfarin (Coumadin), cont to have INR checked with your PCP. Ask your nurse for written material about Coumadin and to provide patient education before discharge. if needed.    Diagnosis: HLD (hyperlipidemia)  Assessment and Plan of Treatment: Continue with your cholesterol medications. Eat a heart healthy diet that is low in saturated fats and salt, and includes whole grains, fruits, vegetables and lean protein; exercise regularly (consult with your physician or cardiologist first); maintain a heart healthy weight; if you smoke - quit (A resource to help you stop smoking is the [facility] for [employer] – phone number [phone].). Continue to follow with your primary physician or cardiologist.    Diagnosis: HTN (hypertension)  Assessment and Plan of Treatment: Continue with your blood pressure medications; eat a heart healthy diet with low salt diet; exercise regularly (consult with your physician or cardiologist first); maintain a heart healthy weight; if you smoke - quit (A resource to help you stop smoking is the [facility] [employer] for [employer] – phone number [phone].); include healthy ways to manage stress. Continue to follow with your primary care physician or cardiologist. PRINCIPAL DISCHARGE DIAGNOSIS  Diagnosis: Status post biventricular pacemaker  Assessment and Plan of Treatment: your pacemaker can send an electrical signal to your heart when it is necessary  call your doctor if you feel dizzy, lightheaded, shortness of breath, confused, more tired, faint  you will follow up with your pacemaker doctor regularly to check your pacemaker  your pacemaker battery usually will last 5 - 8 years  avoid certain electric or magnetic equipment  if you cannot walk through metal detector, ask for hand security search  most of the time you will not be able to have MRI  follow directions  that you received about arm use and mobility and driving.  you make want to get a emergency medical bracelet telling peoply you have a pacemaker  tell any health care provider that you have a pacemaker      SECONDARY DISCHARGE DIAGNOSES  Diagnosis: Chronic CHF  Assessment and Plan of Treatment: Take your medications as prescribed. Follow a low-salt, low salt, low cholesterol heart healthy diet. Weigh yourself every day and keep a record; call your doctor if you gain 2 pounds over one to two days or 5 pounds over three days. Get to or maintain a healthy weight; ask your heart failure team for referrals to a registered dietitian if needed. Avoid alcohol. Be active (check with your physician or cardiologist first). Find healthy ways to deal with stress, such as deep breathing, meditation, exercise, and doing hobbies that you enjoy. If you smoke, quit. (A resource to help you stop smoking is the [facility] [employer] – phone number [phone].).    Diagnosis: Atrial fibrillation  Assessment and Plan of Treatment: Continue with your cardiologist and primary care MD. Continue your current medications. Call your physician for palpitations, feelings of rapid heart beat, lightheadedness, or dizziness. If you are on warfarin (Coumadin), cont to have INR checked with your PCP. Ask your nurse for written material about Coumadin and to provide patient education before discharge. if needed.    Diagnosis: HLD (hyperlipidemia)  Assessment and Plan of Treatment: Continue with your cholesterol medications. Eat a heart healthy diet that is low in saturated fats and salt, and includes whole grains, fruits, vegetables and lean protein; exercise regularly (consult with your physician or cardiologist first); maintain a heart healthy weight; if you smoke - quit (A resource to help you stop smoking is the [facility] [employer] – phone number [phone].). Continue to follow with your primary physician or cardiologist.    Diagnosis: HTN (hypertension)  Assessment and Plan of Treatment: Continue with your blood pressure medications; eat a heart healthy diet with low salt diet; exercise regularly (consult with your physician or cardiologist first); maintain a heart healthy weight; if you smoke - quit (A resource to help you stop smoking is the [facility] – phone number [phone].); include healthy ways to manage stress. Continue to follow with your primary care physician or cardiologist.

## 2019-09-13 NOTE — PATIENT PROFILE ADULT - NSPROGENSOURCEINFO_GEN_A_NUR
Manual Repair Warning Statement: We plan on removing the manually selected variable below in favor of our much easier automatic structured text blocks found in the previous tab. We decided to do this to help make the flow better and give you the full power of structured data. Manual selection is never going to be ideal in our platform and I would encourage you to avoid using manual selection from this point on, especially since I will be sunsetting this feature. It is important that you do one of two things with the customized text below. First, you can save all of the text in a word file so you can have it for future reference. Second, transfer the text to the appropriate area in the Library tab. Lastly, if there is a flap or graft type which we do not have you need to let us know right away so I can add it in before the variable is hidden. No need to panic, we plan to give you roughly 6 months to make the change. patient

## 2019-09-13 NOTE — DISCHARGE NOTE PROVIDER - NSDCCPTREATMENT_GEN_ALL_CORE_FT
PRINCIPAL PROCEDURE  Procedure: Upgrade, implanted pacemaker, to biventricular ICD  Findings and Treatment: PRINCIPAL PROCEDURE  Procedure: Replacement of biventricular pacemaker  Findings and Treatment:

## 2019-09-13 NOTE — CHART NOTE - NSCHARTNOTEFT_GEN_A_CORE
S/P PPM upgrade to BiV      T(C): 36.2 (09-13-19 @ 14:15), Max: 36.6 (09-13-19 @ 09:27)  HR: 60 (09-13-19 @ 15:45) (60 - 85)  BP: 122/62 (09-13-19 @ 15:45) (120/66 - 154/75)  RR: 17 (09-13-19 @ 15:45) (14 - 17)  SpO2: 100% (09-13-19 @ 15:45) (93% - 100%)    Left infraclavicular implant site no hematoma, no bleeding, no ecchymosis  Post Device teaching done with patient by primary nurse Kamala  device card, and booklet given to patient/family  by primary nurse   monitor telemetry over night  F/U cxray post op offical read  to r/o pneumothorax and check lead tip placement  anticipate discharge home tommorow after IV abx complete, telemetry stable with stable Vs and labs  dispo F/U in EP clinic  for wound check appointment on 9/25/2019 at 8:45 am

## 2019-09-13 NOTE — H&P CARDIOLOGY - PMH
Atrial fibrillation    BPH (benign prostatic hyperplasia)    CAD (coronary atherosclerotic disease) s/p CABG/Stent BMS x 2 to LAD    Dyslipidemia    HTN (hypertension)

## 2019-09-13 NOTE — PROGRESS NOTE ADULT - SUBJECTIVE AND OBJECTIVE BOX
Pre-op Diagnosis: HF    Post-op Diagnosis: HF    Procedure: CRT upgrade    Electrophysiologist: Gavi    Anesthesia: Mary Annmovinathaly    Access: subclavian    Description: Lateral LV. [employer]    Complications: None    EBL: < 10 cc    Disposition: Recovery    Plan: CXR. Abx. Coumadin tonight

## 2019-09-13 NOTE — CHART NOTE - NSCHARTNOTEFT_GEN_A_CORE
s/p BiV upgrade VVIR 60.   Site benign, no hematoma or ecchymosis  Ancef 1G Q 8 hours x e doses (next dose 19:30),  may resume Coumadin tonight  CXR in am

## 2019-09-13 NOTE — DISCHARGE NOTE PROVIDER - NSDCFUADDAPPT_GEN_ALL_CORE_FT
F/U in EP clinic  for wound check appointment on 9/25/2019 at 8:45 am F/U in EP clinic  for wound check appointment on 9/25/2019 at 8:45 am  PLEASE FOLLOW UP WITH YOUR PCP REGARDING YOUR ELEVATED LIVER ENZYMES

## 2019-09-13 NOTE — H&P CARDIOLOGY - PSH
S/P CABG x 2    S/P coronary artery stent placement x 2 (BMS to LAD)    s/p hernia repair x 2    s/p mechanical aortic valve replacement (1999)    S/P mitral valve repair (2006)    S/P rotator cuff surgery

## 2019-09-13 NOTE — DISCHARGE NOTE PROVIDER - CARE PROVIDER_API CALL
Herman Gatica)  Cardiac Electrophysiology; Cardiology  [address] [place] [address]  Phone: [phone]  Fax: [phone]  Follow Up Time:     Chele Mireles)  Cardiovascular Disease; Internal Medicine  [address] [facility] [address]  [place] [address]  Phone: [phone]  Fax: [phone]  Follow Up Time:

## 2019-09-14 ENCOUNTER — TRANSCRIPTION ENCOUNTER (OUTPATIENT)
Age: 84
End: 2019-09-14

## 2019-09-14 VITALS
TEMPERATURE: 98 F | HEART RATE: 100 BPM | RESPIRATION RATE: 17 BRPM | DIASTOLIC BLOOD PRESSURE: 64 MMHG | SYSTOLIC BLOOD PRESSURE: 108 MMHG | OXYGEN SATURATION: 96 %

## 2019-09-14 LAB
ALBUMIN SERPL ELPH-MCNC: 3.4 G/DL — SIGNIFICANT CHANGE UP (ref 3.3–5)
ALP SERPL-CCNC: 135 U/L — HIGH (ref 40–120)
ALT FLD-CCNC: 15 U/L — SIGNIFICANT CHANGE UP (ref 10–45)
ANION GAP SERPL CALC-SCNC: 8 MMOL/L — SIGNIFICANT CHANGE UP (ref 5–17)
APTT BLD: 41.7 SEC — HIGH (ref 27.5–36.3)
AST SERPL-CCNC: 27 U/L — SIGNIFICANT CHANGE UP (ref 10–40)
BILIRUB DIRECT SERPL-MCNC: 0.3 MG/DL — HIGH (ref 0–0.2)
BILIRUB INDIRECT FLD-MCNC: 1 MG/DL — SIGNIFICANT CHANGE UP (ref 0.2–1)
BILIRUB SERPL-MCNC: 1.3 MG/DL — HIGH (ref 0.2–1.2)
BUN SERPL-MCNC: 25 MG/DL — HIGH (ref 7–23)
CALCIUM SERPL-MCNC: 9.4 MG/DL — SIGNIFICANT CHANGE UP (ref 8.4–10.5)
CHLORIDE SERPL-SCNC: 99 MMOL/L — SIGNIFICANT CHANGE UP (ref 96–108)
CO2 SERPL-SCNC: 29 MMOL/L — SIGNIFICANT CHANGE UP (ref 22–31)
CREAT SERPL-MCNC: 0.97 MG/DL — SIGNIFICANT CHANGE UP (ref 0.5–1.3)
GLUCOSE SERPL-MCNC: 118 MG/DL — HIGH (ref 70–99)
HCT VFR BLD CALC: 34.4 % — LOW (ref 39–50)
HGB BLD-MCNC: 11.5 G/DL — LOW (ref 13–17)
INR BLD: 2.32 RATIO — HIGH (ref 0.88–1.16)
MCHC RBC-ENTMCNC: 30.7 PG — SIGNIFICANT CHANGE UP (ref 27–34)
MCHC RBC-ENTMCNC: 33.5 GM/DL — SIGNIFICANT CHANGE UP (ref 32–36)
MCV RBC AUTO: 91.8 FL — SIGNIFICANT CHANGE UP (ref 80–100)
PLATELET # BLD AUTO: 77 K/UL — LOW (ref 150–400)
POTASSIUM SERPL-MCNC: 5.3 MMOL/L — SIGNIFICANT CHANGE UP (ref 3.5–5.3)
POTASSIUM SERPL-SCNC: 5.3 MMOL/L — SIGNIFICANT CHANGE UP (ref 3.5–5.3)
PROT SERPL-MCNC: 6 G/DL — SIGNIFICANT CHANGE UP (ref 6–8.3)
PROTHROM AB SERPL-ACNC: 27.2 SEC — HIGH (ref 10–12.9)
RBC # BLD: 3.75 M/UL — LOW (ref 4.2–5.8)
RBC # FLD: 13.9 % — SIGNIFICANT CHANGE UP (ref 10.3–14.5)
SODIUM SERPL-SCNC: 136 MMOL/L — SIGNIFICANT CHANGE UP (ref 135–145)
WBC # BLD: 7.1 K/UL — SIGNIFICANT CHANGE UP (ref 3.8–10.5)
WBC # FLD AUTO: 7.1 K/UL — SIGNIFICANT CHANGE UP (ref 3.8–10.5)

## 2019-09-14 PROCEDURE — 71046 X-RAY EXAM CHEST 2 VIEWS: CPT

## 2019-09-14 PROCEDURE — C1730: CPT

## 2019-09-14 PROCEDURE — 86850 RBC ANTIBODY SCREEN: CPT

## 2019-09-14 PROCEDURE — 86900 BLOOD TYPING SEROLOGIC ABO: CPT

## 2019-09-14 PROCEDURE — 85027 COMPLETE CBC AUTOMATED: CPT

## 2019-09-14 PROCEDURE — C1892: CPT

## 2019-09-14 PROCEDURE — C1894: CPT

## 2019-09-14 PROCEDURE — 93005 ELECTROCARDIOGRAM TRACING: CPT

## 2019-09-14 PROCEDURE — C2621: CPT

## 2019-09-14 PROCEDURE — 80048 BASIC METABOLIC PNL TOTAL CA: CPT

## 2019-09-14 PROCEDURE — 80053 COMPREHEN METABOLIC PANEL: CPT

## 2019-09-14 PROCEDURE — 80076 HEPATIC FUNCTION PANEL: CPT

## 2019-09-14 PROCEDURE — C1769: CPT

## 2019-09-14 PROCEDURE — 33231 INSRT PULSE GEN W/MULT LEADS: CPT

## 2019-09-14 PROCEDURE — C1889: CPT

## 2019-09-14 PROCEDURE — C1887: CPT

## 2019-09-14 PROCEDURE — 71046 X-RAY EXAM CHEST 2 VIEWS: CPT | Mod: 26

## 2019-09-14 PROCEDURE — 93010 ELECTROCARDIOGRAM REPORT: CPT

## 2019-09-14 PROCEDURE — 86901 BLOOD TYPING SEROLOGIC RH(D): CPT

## 2019-09-14 PROCEDURE — 85610 PROTHROMBIN TIME: CPT

## 2019-09-14 PROCEDURE — 33225 L VENTRIC PACING LEAD ADD-ON: CPT

## 2019-09-14 PROCEDURE — 85730 THROMBOPLASTIN TIME PARTIAL: CPT

## 2019-09-14 RX ORDER — OXYCODONE HYDROCHLORIDE 5 MG/1
1 TABLET ORAL
Qty: 9 | Refills: 0
Start: 2019-09-14 | End: 2019-09-16

## 2019-09-14 RX ORDER — ALLOPURINOL 300 MG
1 TABLET ORAL
Qty: 0 | Refills: 0 | DISCHARGE

## 2019-09-14 RX ORDER — ATORVASTATIN CALCIUM 80 MG/1
1 TABLET, FILM COATED ORAL
Qty: 0 | Refills: 0 | DISCHARGE

## 2019-09-14 RX ORDER — OXYCODONE AND ACETAMINOPHEN 5; 325 MG/1; MG/1
1 TABLET ORAL ONCE
Refills: 0 | Status: DISCONTINUED | OUTPATIENT
Start: 2019-09-14 | End: 2019-09-14

## 2019-09-14 RX ADMIN — OXYCODONE AND ACETAMINOPHEN 1 TABLET(S): 5; 325 TABLET ORAL at 04:29

## 2019-09-14 RX ADMIN — Medication 100 MILLIGRAM(S): at 04:10

## 2019-09-14 RX ADMIN — SODIUM CHLORIDE 3 MILLILITER(S): 9 INJECTION INTRAMUSCULAR; INTRAVENOUS; SUBCUTANEOUS at 13:37

## 2019-09-14 RX ADMIN — SACUBITRIL AND VALSARTAN 1 TABLET(S): 24; 26 TABLET, FILM COATED ORAL at 05:56

## 2019-09-14 RX ADMIN — OXYCODONE AND ACETAMINOPHEN 1 TABLET(S): 5; 325 TABLET ORAL at 04:59

## 2019-09-14 RX ADMIN — Medication 100 MILLIGRAM(S): at 05:56

## 2019-09-14 RX ADMIN — CARVEDILOL PHOSPHATE 6.25 MILLIGRAM(S): 80 CAPSULE, EXTENDED RELEASE ORAL at 05:56

## 2019-09-14 RX ADMIN — SODIUM CHLORIDE 3 MILLILITER(S): 9 INJECTION INTRAMUSCULAR; INTRAVENOUS; SUBCUTANEOUS at 04:16

## 2019-09-14 NOTE — DISCHARGE NOTE NURSING/CASE MANAGEMENT/SOCIAL WORK - PATIENT PORTAL LINK FT
You can access the FollowMyHealth Patient Portal offered by [facility] by registering at the following website: [email or web address]. By joining [employer]’s FollowMyHealth portal, you will also be able to view your health information using other applications (apps) compatible with our system.

## 2019-09-14 NOTE — PROGRESS NOTE ADULT - SUBJECTIVE AND OBJECTIVE BOX
Patient is a 86y old  Male who presents with a chief complaint of device upgrade (13 Sep 2019 16:30) now s/p BIV upgrade viw LACW           Allergies    No Known Allergies    Intolerances        Medications:  allopurinol 100 milliGRAM(s) Oral daily  atorvastatin 10 milliGRAM(s) Oral at bedtime  carvedilol 6.25 milliGRAM(s) Oral every 12 hours  finasteride 5 milliGRAM(s) Oral daily  furosemide    Tablet 40 milliGRAM(s) Oral daily  oxyCODONE    5 mG/acetaminophen 325 mG 1 Tablet(s) Oral once  sacubitril 24 mG/valsartan 26 mG 1 Tablet(s) Oral two times a day  sodium chloride 0.9% lock flush 3 milliLiter(s) IV Push every 8 hours      Vitals:  T(C): 37 (19 @ 20:15), Max: 37 (19 @ 20:15)  HR: 72 (19 @ 04:19) (60 - 85)  BP: 138/78 (19 @ 04:19) (108/50 - 154/75)  BP(mean): 101 (19 @ 09:27) (101 - 101)  RR: 17 (19 @ 21:15) (14 - 17)  SpO2: 96% (19 @ 21:15) (93% - 100%)  Wt(kg): --  Daily Height in cm: 167.64 (13 Sep 2019 14:15)    Daily Weight in k.5 (13 Sep 2019 09:27)  I&O's Summary    13 Sep 2019 07:01  -  14 Sep 2019 04:25  --------------------------------------------------------  IN: 240 mL / OUT: 750 mL / NET: -510 mL          Physical Exam:  Appearance: Normal  Procedural Access Site: Left anterior chest wall. - hematoma, positive swollen, Non-tender to palpation, 2+ pulse, No bruit, + Ecchymosis  Musculoskeletal: No clubbing, No joint deformity   Neurologic: Non-focal  Psychiatry: AAOx3, Mood & affect appropriate  Skin: No rashes, No ecchymoses, No cyanosis        136  |  99  |  25<H>  ----------------------------<  118<H>  5.3   |  29  |  0.97    Ca    9.4      14 Sep 2019 01:49    TPro  7.5  /  Alb  4.0  /  TBili  1.7<H>  /  DBili  x   /  AST  153<H>  /  ALT  42  /  AlkPhos  121<H>      PT/INR - ( 14 Sep 2019 01:49 )   PT: 27.2 sec;   INR: 2.32 ratio         PTT - ( 14 Sep 2019 01:49 )  PTT:41.7 sec      Interpretation of Telemetry:

## 2019-09-16 ENCOUNTER — INBOUND DOCUMENT (OUTPATIENT)
Age: 84
End: 2019-09-16

## 2019-09-17 ENCOUNTER — INBOUND DOCUMENT (OUTPATIENT)
Age: 84
End: 2019-09-17

## 2019-09-20 ENCOUNTER — LABORATORY RESULT (OUTPATIENT)
Age: 84
End: 2019-09-20

## 2019-09-20 ENCOUNTER — APPOINTMENT (OUTPATIENT)
Dept: ELECTROPHYSIOLOGY | Facility: CLINIC | Age: 84
End: 2019-09-20
Payer: MEDICARE

## 2019-09-20 VITALS — OXYGEN SATURATION: 97 % | DIASTOLIC BLOOD PRESSURE: 79 MMHG | HEART RATE: 61 BPM | SYSTOLIC BLOOD PRESSURE: 152 MMHG

## 2019-09-20 PROCEDURE — 93280 PM DEVICE PROGR EVAL DUAL: CPT

## 2019-09-20 PROCEDURE — 99024 POSTOP FOLLOW-UP VISIT: CPT

## 2019-09-23 LAB
BUN SERPL-MCNC: 20 MG/DL
CALCIUM SERPL-MCNC: 9 MG/DL
CHLORIDE SERPL-SCNC: 95 MMOL/L
CO2 SERPL-SCNC: 26 MMOL/L
CREAT SERPL-MCNC: 0.82 MG/DL
GLUCOSE SERPL-MCNC: 112 MG/DL
POTASSIUM SERPL-SCNC: 5.1 MMOL/L
SODIUM SERPL-SCNC: 134 MMOL/L

## 2019-09-24 ENCOUNTER — APPOINTMENT (OUTPATIENT)
Dept: CARDIOLOGY | Facility: CLINIC | Age: 84
End: 2019-09-24
Payer: MEDICARE

## 2019-09-24 VITALS
OXYGEN SATURATION: 96 % | WEIGHT: 144 LBS | SYSTOLIC BLOOD PRESSURE: 150 MMHG | HEIGHT: 67 IN | HEART RATE: 90 BPM | BODY MASS INDEX: 22.6 KG/M2 | DIASTOLIC BLOOD PRESSURE: 80 MMHG

## 2019-09-24 PROCEDURE — 99215 OFFICE O/P EST HI 40 MIN: CPT

## 2019-09-24 RX ORDER — TORSEMIDE 20 MG/1
20 TABLET ORAL DAILY
Qty: 90 | Refills: 0 | Status: ACTIVE | COMMUNITY
Start: 2019-09-24 | End: 1900-01-01

## 2019-09-26 ENCOUNTER — APPOINTMENT (OUTPATIENT)
Dept: ELECTROPHYSIOLOGY | Facility: CLINIC | Age: 84
End: 2019-09-26
Payer: MEDICARE

## 2019-09-26 VITALS
WEIGHT: 145 LBS | HEART RATE: 77 BPM | BODY MASS INDEX: 22.76 KG/M2 | SYSTOLIC BLOOD PRESSURE: 136 MMHG | OXYGEN SATURATION: 99 % | DIASTOLIC BLOOD PRESSURE: 83 MMHG | HEIGHT: 67 IN

## 2019-09-26 PROCEDURE — 99024 POSTOP FOLLOW-UP VISIT: CPT

## 2019-10-03 LAB
INR PPP: 2.1
PROTHROMBIN TIME COMMENT: NORMAL

## 2019-10-04 ENCOUNTER — APPOINTMENT (OUTPATIENT)
Dept: ELECTROPHYSIOLOGY | Facility: CLINIC | Age: 84
End: 2019-10-04
Payer: MEDICARE

## 2019-10-04 VITALS
DIASTOLIC BLOOD PRESSURE: 76 MMHG | OXYGEN SATURATION: 97 % | HEIGHT: 67 IN | SYSTOLIC BLOOD PRESSURE: 115 MMHG | HEART RATE: 85 BPM

## 2019-10-04 PROCEDURE — 99024 POSTOP FOLLOW-UP VISIT: CPT

## 2019-10-17 ENCOUNTER — NON-APPOINTMENT (OUTPATIENT)
Age: 84
End: 2019-10-17

## 2019-10-17 ENCOUNTER — APPOINTMENT (OUTPATIENT)
Dept: CARDIOLOGY | Facility: CLINIC | Age: 84
End: 2019-10-17
Payer: MEDICARE

## 2019-10-17 VITALS
BODY MASS INDEX: 22.29 KG/M2 | OXYGEN SATURATION: 98 % | SYSTOLIC BLOOD PRESSURE: 117 MMHG | DIASTOLIC BLOOD PRESSURE: 73 MMHG | HEART RATE: 74 BPM | HEIGHT: 67 IN | WEIGHT: 142 LBS

## 2019-10-17 DIAGNOSIS — I34.0 NONRHEUMATIC MITRAL (VALVE) INSUFFICIENCY: ICD-10-CM

## 2019-10-17 DIAGNOSIS — R26.81 UNSTEADINESS ON FEET: ICD-10-CM

## 2019-10-17 DIAGNOSIS — R29.898 OTHER SYMPTOMS AND SIGNS INVOLVING THE MUSCULOSKELETAL SYSTEM: ICD-10-CM

## 2019-10-17 DIAGNOSIS — I35.9 NONRHEUMATIC AORTIC VALVE DISORDER, UNSPECIFIED: ICD-10-CM

## 2019-10-17 PROCEDURE — 99215 OFFICE O/P EST HI 40 MIN: CPT

## 2019-10-17 PROCEDURE — 93284 PRGRMG EVAL IMPLANTABLE DFB: CPT | Mod: 59

## 2019-10-17 RX ORDER — MELOXICAM 7.5 MG/1
7.5 TABLET ORAL TWICE DAILY
Qty: 60 | Refills: 3 | Status: ACTIVE | COMMUNITY
Start: 2018-09-27

## 2019-10-29 ENCOUNTER — NON-APPOINTMENT (OUTPATIENT)
Age: 84
End: 2019-10-29

## 2019-10-29 ENCOUNTER — EMERGENCY (EMERGENCY)
Facility: HOSPITAL | Age: 84
LOS: 1 days | Discharge: LEFT BEFORE DISCHARGE | End: 2019-10-29
Attending: EMERGENCY MEDICINE
Payer: MEDICARE

## 2019-10-29 ENCOUNTER — APPOINTMENT (OUTPATIENT)
Dept: ELECTROPHYSIOLOGY | Facility: CLINIC | Age: 84
End: 2019-10-29
Payer: MEDICARE

## 2019-10-29 VITALS
RESPIRATION RATE: 20 BRPM | HEIGHT: 66 IN | DIASTOLIC BLOOD PRESSURE: 73 MMHG | TEMPERATURE: 98 F | HEART RATE: 84 BPM | WEIGHT: 141.1 LBS | OXYGEN SATURATION: 97 % | SYSTOLIC BLOOD PRESSURE: 129 MMHG

## 2019-10-29 VITALS
SYSTOLIC BLOOD PRESSURE: 110 MMHG | DIASTOLIC BLOOD PRESSURE: 74 MMHG | RESPIRATION RATE: 18 BRPM | OXYGEN SATURATION: 96 % | HEART RATE: 69 BPM

## 2019-10-29 VITALS
DIASTOLIC BLOOD PRESSURE: 70 MMHG | WEIGHT: 141 LBS | BODY MASS INDEX: 22.13 KG/M2 | OXYGEN SATURATION: 98 % | HEIGHT: 67 IN | HEART RATE: 72 BPM | SYSTOLIC BLOOD PRESSURE: 112 MMHG

## 2019-10-29 DIAGNOSIS — Z95.0 PRESENCE OF CARDIAC PACEMAKER: ICD-10-CM

## 2019-10-29 LAB
ALBUMIN SERPL ELPH-MCNC: 2.8 G/DL — LOW (ref 3.3–5)
ALP SERPL-CCNC: 500 U/L — HIGH (ref 40–120)
ALT FLD-CCNC: 32 U/L — SIGNIFICANT CHANGE UP (ref 10–45)
ANION GAP SERPL CALC-SCNC: 10 MMOL/L — SIGNIFICANT CHANGE UP (ref 5–17)
APTT BLD: 72 SEC — HIGH (ref 27.5–36.3)
AST SERPL-CCNC: 91 U/L — HIGH (ref 10–40)
BASOPHILS # BLD AUTO: 0 K/UL — SIGNIFICANT CHANGE UP (ref 0–0.2)
BASOPHILS NFR BLD AUTO: 0 % — SIGNIFICANT CHANGE UP (ref 0–2)
BILIRUB SERPL-MCNC: 2.4 MG/DL — HIGH (ref 0.2–1.2)
BUN SERPL-MCNC: 27 MG/DL — HIGH (ref 7–23)
CALCIUM SERPL-MCNC: 10 MG/DL — SIGNIFICANT CHANGE UP (ref 8.4–10.5)
CHLORIDE SERPL-SCNC: 92 MMOL/L — LOW (ref 96–108)
CO2 SERPL-SCNC: 27 MMOL/L — SIGNIFICANT CHANGE UP (ref 22–31)
CREAT SERPL-MCNC: 0.79 MG/DL — SIGNIFICANT CHANGE UP (ref 0.5–1.3)
EOSINOPHIL # BLD AUTO: 0 K/UL — SIGNIFICANT CHANGE UP (ref 0–0.5)
EOSINOPHIL NFR BLD AUTO: 0 % — SIGNIFICANT CHANGE UP (ref 0–6)
GLUCOSE SERPL-MCNC: 92 MG/DL — SIGNIFICANT CHANGE UP (ref 70–99)
HCT VFR BLD CALC: 32.4 % — LOW (ref 39–50)
HGB BLD-MCNC: 10.7 G/DL — LOW (ref 13–17)
INR BLD: 7.59 RATIO — CRITICAL HIGH (ref 0.88–1.16)
LYMPHOCYTES # BLD AUTO: 0.18 K/UL — LOW (ref 1–3.3)
LYMPHOCYTES # BLD AUTO: 6 % — LOW (ref 13–44)
MCHC RBC-ENTMCNC: 28.5 PG — SIGNIFICANT CHANGE UP (ref 27–34)
MCHC RBC-ENTMCNC: 33 GM/DL — SIGNIFICANT CHANGE UP (ref 32–36)
MCV RBC AUTO: 86.4 FL — SIGNIFICANT CHANGE UP (ref 80–100)
MONOCYTES # BLD AUTO: 0.33 K/UL — SIGNIFICANT CHANGE UP (ref 0–0.9)
MONOCYTES NFR BLD AUTO: 11 % — SIGNIFICANT CHANGE UP (ref 2–14)
NEUTROPHILS # BLD AUTO: 2.45 K/UL — SIGNIFICANT CHANGE UP (ref 1.8–7.4)
NEUTROPHILS NFR BLD AUTO: 81 % — HIGH (ref 43–77)
PLATELET # BLD AUTO: 53 K/UL — LOW (ref 150–400)
POTASSIUM SERPL-MCNC: 5.2 MMOL/L — SIGNIFICANT CHANGE UP (ref 3.5–5.3)
POTASSIUM SERPL-SCNC: 5.2 MMOL/L — SIGNIFICANT CHANGE UP (ref 3.5–5.3)
PROT SERPL-MCNC: 5.9 G/DL — LOW (ref 6–8.3)
PROTHROM AB SERPL-ACNC: 92.1 SEC — HIGH (ref 10–12.9)
RBC # BLD: 3.75 M/UL — LOW (ref 4.2–5.8)
RBC # FLD: 17.7 % — HIGH (ref 10.3–14.5)
SODIUM SERPL-SCNC: 129 MMOL/L — LOW (ref 135–145)
WBC # BLD: 3.03 K/UL — LOW (ref 3.8–10.5)
WBC # FLD AUTO: 3.03 K/UL — LOW (ref 3.8–10.5)

## 2019-10-29 PROCEDURE — 99283 EMERGENCY DEPT VISIT LOW MDM: CPT

## 2019-10-29 PROCEDURE — 80053 COMPREHEN METABOLIC PANEL: CPT

## 2019-10-29 PROCEDURE — 85610 PROTHROMBIN TIME: CPT

## 2019-10-29 PROCEDURE — 85027 COMPLETE CBC AUTOMATED: CPT

## 2019-10-29 PROCEDURE — 93281 PM DEVICE PROGR EVAL MULTI: CPT

## 2019-10-29 PROCEDURE — 99024 POSTOP FOLLOW-UP VISIT: CPT

## 2019-10-29 PROCEDURE — 93000 ELECTROCARDIOGRAM COMPLETE: CPT | Mod: 59

## 2019-10-29 PROCEDURE — 99284 EMERGENCY DEPT VISIT MOD MDM: CPT | Mod: GC

## 2019-10-29 PROCEDURE — 85730 THROMBOPLASTIN TIME PARTIAL: CPT

## 2019-10-29 NOTE — ED PROVIDER NOTE - PHYSICAL EXAMINATION
General: well-appearing elderly man in no acute distress  Head: normocephalic, atraumatic  Eyes: clear eyes, no discharge  Mouth: moist mucous membranes  Neck: supple neck  CV: normal rate and rhythm, mechanical valve click, +bilateral LE pitting edema, peripheral pulses 2+ bilaterally  Respiratory: clear to auscultation bilaterally  Abdomen: soft, nontender, nondistended  : no suprapubic tenderness, no CVAT  MSK: no Cspine tenderness to palpation, no midline tenderness to palpation  Neuro: alert and oriented x3, CN II-XII grossly intact, speech clear, moving all extremities spontaneously General: well-appearing elderly man in no acute distress  Head: normocephalic, atraumatic  Eyes: clear eyes, no discharge  Mouth: moist mucous membranes  Neck: supple neck  CV: normal rate and rhythm, mechanical valve click, +bilateral LE pitting edema, peripheral pulses 2+ bilaterally  Respiratory: crackles in bilateral bases R>L  Abdomen: soft, nontender, nondistended  : no suprapubic tenderness, no CVAT  MSK: no Cspine tenderness to palpation, no midline tenderness to palpation  Neuro: alert and oriented x3, CN II-XII grossly intact, speech clear, moving all extremities spontaneously

## 2019-10-29 NOTE — ED ADULT NURSE NOTE - NSIMPLEMENTINTERV_GEN_ALL_ED
Implemented All Fall with Harm Risk Interventions:  [place] to call system. Call bell, personal items and telephone within reach. Instruct patient to call for assistance. Room bathroom lighting operational. Non-slip footwear when patient is off stretcher. Physically safe environment: no spills, clutter or unnecessary equipment. Stretcher in lowest position, wheels locked, appropriate side rails in place. Provide visual cue, wrist band, yellow gown, etc. Monitor gait and stability. Monitor for mental status changes and reorient to person, place, and time. Review medications for side effects contributing to fall risk. Reinforce activity limits and safety measures with patient and family. Provide visual clues: red socks.

## 2019-10-29 NOTE — ED PROVIDER NOTE - CLINICAL SUMMARY MEDICAL DECISION MAKING FREE TEXT BOX
85yo man presents with concerns with elevated INR with no obvious signs of bleeding. Will re-check INR. Likely not reverse due to high risk and will have pt d/c coumadin for the next few days and f/u with cardiologist. [place]: 85yo man presents with concerns with elevated INR with no obvious signs of bleeding. Will re-check INR. Likely not reverse due to high risk and will have pt d/c coumadin for the next few days and f/u with cardiologist.

## 2019-10-29 NOTE — ED PROVIDER NOTE - NSFOLLOWUPINSTRUCTIONS_ED_ALL_ED_FT
Please follow up with your primary care provider and cardiologist in the next few days.    Stop your coumadin for the next few days.    Continue all other home medications as prescribed.    Return to the Emergency Department immediately if you have  or any new and concerning symptoms or concerns.   Return to the ED for any worsening symptoms of slurred speech, difficulty swallowing, change in vision, weakness in arms or legs, worsening headache, chest pain, difficulty breathing, bleeding in stool or urine, or any new or concerning symptoms.    Please read all attached. Please follow up with your primary care provider and cardiologist in the next few days. You need to follow up for evaluation of your abnormal blood work.    Stop your coumadin for the next few days.    Continue all other home medications as prescribed.    Return to the Emergency Department immediately if you have  or any new and concerning symptoms or concerns.   Return to the ED for any worsening symptoms of slurred speech, difficulty swallowing, change in vision, weakness in arms or legs, worsening headache, chest pain, difficulty breathing, bleeding in stool or urine, or any new or concerning symptoms.    Please read all attached.

## 2019-10-29 NOTE — ED ADULT NURSE NOTE - OBJECTIVE STATEMENT
Patient   is  alert   and  oriented x3.  Color  is  good  and  skin warm to touch.   He  had  an  episode  of  nose  bleeding  which is  resolved  now.  Patient  is  sent  here  for  elevated  INR.  No   other  S/S  of  bleeding  noted.

## 2019-10-29 NOTE — ED PROVIDER NOTE - OBJECTIVE STATEMENT
87yo man PMH CHF, HLD, HTN, BPH, CAD s/p CABG, afib, s/p PPM with recent replacement last month, s/p valve replacement (mechanical) on coumadin presents with elevated INR. Pt is able to check his own INR levels at home and had elevation of INR to 8 and was instructed to stop coumadin last night. He rechecked his coumadin levels and it came down to 7 this morning. He denies blood in stool, blood in urine, headache, n/v, or focal weakness or change in sensation.

## 2019-10-29 NOTE — ED PROVIDER NOTE - PATIENT PORTAL LINK FT
You can access the FollowMyHealth Patient Portal offered by [facility] by registering at the following website: [email or web address]. By joining [facility]’s FollowMyHealth portal, you will also be able to view your health information using other applications (apps) compatible with our system. You can access the FollowMyHealth Patient Portal offered by [facility] by registering at the following website: [email or web address]. By joining [employer]’s FollowMyHealth portal, you will also be able to view your health information using other applications (apps) compatible with our system.

## 2019-10-29 NOTE — ED PROVIDER NOTE - NS ED ROS FT
General: no fever  Head: no headache  Eyes: no vision change  ENT: no nasal discharge/congestion  CV: no chest pain  Resp: no SOB  GI: no N/V, no abdominal pain, no blood in stool  : no dysuria, no hematuria  MSK: +generalized weakness  Skin: no new rash  Neuro: no focal weakness, no change in sensation

## 2019-10-29 NOTE — ED PROVIDER NOTE - PROGRESS NOTE DETAILS
Joanne Johnson, resident MD: pt has elevated INR and alk phos and discussed with pt but family would prefer to leave in order to make appointment with Dr. Gatica. printed out results for patient to take home. Spoke with Dr. Mireles and notified of elevated INR and elevated ALP. He will follow up with patient.

## 2019-10-30 PROBLEM — Z95.0 BIVENTRICULAR CARDIAC PACEMAKER IN SITU: Status: ACTIVE | Noted: 2019-10-17

## 2019-10-30 NOTE — HISTORY OF PRESENT ILLNESS
[de-identified] : While breathing is improved he still very unsteady, weak, and tired.  He also complains of severe back pain.

## 2019-10-30 NOTE — PROCEDURE
[CRT-P] : Cardiac resynchronization therapy pacemaker [Sensing Amplitude ___mv] : sensing amplitude was [unfilled] mv [Lead Imp:  ___ohms] : lead impedance was [unfilled] ohms [___V @] : [unfilled] V [___ ms] : [unfilled] ms [de-identified] : Medtronic [de-identified] : Percepta Quad [de-identified] : [id number] [de-identified] : 9/13/2019 [de-identified] : 94% BIV paced.

## 2019-10-30 NOTE — ED POST DISCHARGE NOTE - DETAILS
spoke with patient, states he has always had low plts, has appt with dr. loco tomorrow and will relay the information - Kate Fermin PA-C

## 2019-10-30 NOTE — DISCUSSION/SUMMARY
[FreeTextEntry1] : Normal device function with adequate safety margins. WHile dyspnea has improved he remains with fatigue and weakness.  Histograms are adequate.  Hopefully he will further remodel favorable and we can see more benefit.  He will follow up with Dr. Mireles.  I will see "as needed".

## 2019-10-30 NOTE — PHYSICAL EXAM
[Well Groomed] : well groomed [General Appearance - In No Acute Distress] : no acute distress [Arterial Pulses Normal] : the arterial pulses were normal [Systolic grade ___/6] : A grade [unfilled]/6 systolic murmur was heard. [Diastolic Grade ___/4] : A grade [unfilled]/4 diastolic murmur was heard. [Respiration, Rhythm And Depth] : normal respiratory rhythm and effort [Exaggerated Use Of Accessory Muscles For Inspiration] : no accessory muscle use [Auscultation Breath Sounds / Voice Sounds] : lungs were clear to auscultation bilaterally [Abdomen Soft] : soft [Abdomen Tenderness] : non-tender [Abdomen Mass (___ Cm)] : no abdominal mass palpated [Nail Clubbing] : no clubbing of the fingernails [Cyanosis, Localized] : no localized cyanosis [Petechial Hemorrhages (___cm)] : no petechial hemorrhages [Normal Conjunctiva] : the conjunctiva exhibited no abnormalities [Eyelids - No Xanthelasma] : the eyelids demonstrated no xanthelasmas [Normal Oral Mucosa] : normal oral mucosa [No Oral Pallor] : no oral pallor [No Oral Cyanosis] : no oral cyanosis [Normal Jugular Venous A Waves Present] : normal jugular venous A waves present [Normal Jugular Venous V Waves Present] : normal jugular venous V waves present [No Jugular Venous Joseph A Waves] : no jugular venous joseph A waves [Skin Color & Pigmentation] : normal skin color and pigmentation [] : no rash [No Venous Stasis] : no venous stasis [Skin Lesions] : no skin lesions [No Skin Ulcers] : no skin ulcer [No Xanthoma] : no  xanthoma was observed [Oriented To Time, Place, And Person] : oriented to person, place, and time [Affect] : the affect was normal [Mood] : the mood was normal [No Anxiety] : not feeling anxious [FreeTextEntry1] : walks with a cane

## 2019-10-31 ENCOUNTER — OUTPATIENT (OUTPATIENT)
Dept: OUTPATIENT SERVICES | Facility: HOSPITAL | Age: 84
LOS: 1 days | End: 2019-10-31

## 2019-10-31 ENCOUNTER — APPOINTMENT (OUTPATIENT)
Dept: CARDIOLOGY | Facility: CLINIC | Age: 84
End: 2019-10-31
Payer: MEDICARE

## 2019-10-31 ENCOUNTER — APPOINTMENT (OUTPATIENT)
Dept: CT IMAGING | Facility: IMAGING CENTER | Age: 84
End: 2019-10-31
Payer: MEDICARE

## 2019-10-31 VITALS
SYSTOLIC BLOOD PRESSURE: 110 MMHG | BODY MASS INDEX: 22.13 KG/M2 | DIASTOLIC BLOOD PRESSURE: 60 MMHG | HEART RATE: 70 BPM | HEIGHT: 67 IN | WEIGHT: 141 LBS | OXYGEN SATURATION: 98 %

## 2019-10-31 DIAGNOSIS — I48.91 UNSPECIFIED ATRIAL FIBRILLATION: ICD-10-CM

## 2019-10-31 DIAGNOSIS — R94.5 ABNORMAL RESULTS OF LIVER FUNCTION STUDIES: ICD-10-CM

## 2019-10-31 DIAGNOSIS — I50.23 ACUTE ON CHRONIC SYSTOLIC (CONGESTIVE) HEART FAILURE: ICD-10-CM

## 2019-10-31 DIAGNOSIS — R53.83 OTHER FATIGUE: ICD-10-CM

## 2019-10-31 DIAGNOSIS — Z00.8 ENCOUNTER FOR OTHER GENERAL EXAMINATION: ICD-10-CM

## 2019-10-31 DIAGNOSIS — I50.22 CHRONIC SYSTOLIC (CONGESTIVE) HEART FAILURE: ICD-10-CM

## 2019-10-31 LAB — INR PPP: 5 RATIO

## 2019-10-31 PROCEDURE — 74150 CT ABDOMEN W/O CONTRAST: CPT | Mod: 26

## 2019-10-31 PROCEDURE — 99215 OFFICE O/P EST HI 40 MIN: CPT

## 2019-11-01 LAB
ALBUMIN SERPL ELPH-MCNC: 3 G/DL
ALP BLD-CCNC: 539 U/L
ALT SERPL-CCNC: 34 U/L
ANION GAP SERPL CALC-SCNC: 13 MMOL/L
AST SERPL-CCNC: 104 U/L
BILIRUB SERPL-MCNC: 2.8 MG/DL
BUN SERPL-MCNC: 33 MG/DL
CALCIUM SERPL-MCNC: 10 MG/DL
CHLORIDE SERPL-SCNC: 94 MMOL/L
CO2 SERPL-SCNC: 24 MMOL/L
CREAT SERPL-MCNC: 0.79 MG/DL
GLUCOSE SERPL-MCNC: 88 MG/DL
POTASSIUM SERPL-SCNC: 5.3 MMOL/L
PROT SERPL-MCNC: 5.3 G/DL
SODIUM SERPL-SCNC: 131 MMOL/L

## 2019-11-03 ENCOUNTER — INPATIENT (INPATIENT)
Facility: HOSPITAL | Age: 84
LOS: 5 days | DRG: 291 | End: 2019-11-09
Attending: INTERNAL MEDICINE | Admitting: HOSPITALIST
Payer: MEDICARE

## 2019-11-03 VITALS
HEIGHT: 67 IN | WEIGHT: 141.1 LBS | TEMPERATURE: 98 F | SYSTOLIC BLOOD PRESSURE: 92 MMHG | OXYGEN SATURATION: 100 % | DIASTOLIC BLOOD PRESSURE: 54 MMHG | RESPIRATION RATE: 20 BRPM | HEART RATE: 69 BPM

## 2019-11-03 DIAGNOSIS — I10 ESSENTIAL (PRIMARY) HYPERTENSION: ICD-10-CM

## 2019-11-03 DIAGNOSIS — K76.1 CHRONIC PASSIVE CONGESTION OF LIVER: ICD-10-CM

## 2019-11-03 DIAGNOSIS — N40.0 BENIGN PROSTATIC HYPERPLASIA WITHOUT LOWER URINARY TRACT SYMPTOMS: ICD-10-CM

## 2019-11-03 DIAGNOSIS — E78.5 HYPERLIPIDEMIA, UNSPECIFIED: ICD-10-CM

## 2019-11-03 DIAGNOSIS — I50.43 ACUTE ON CHRONIC COMBINED SYSTOLIC (CONGESTIVE) AND DIASTOLIC (CONGESTIVE) HEART FAILURE: ICD-10-CM

## 2019-11-03 DIAGNOSIS — I25.10 ATHEROSCLEROTIC HEART DISEASE OF NATIVE CORONARY ARTERY WITHOUT ANGINA PECTORIS: ICD-10-CM

## 2019-11-03 DIAGNOSIS — Z29.9 ENCOUNTER FOR PROPHYLACTIC MEASURES, UNSPECIFIED: ICD-10-CM

## 2019-11-03 DIAGNOSIS — I38 ENDOCARDITIS, VALVE UNSPECIFIED: ICD-10-CM

## 2019-11-03 DIAGNOSIS — I48.91 UNSPECIFIED ATRIAL FIBRILLATION: ICD-10-CM

## 2019-11-03 DIAGNOSIS — R19.5 OTHER FECAL ABNORMALITIES: ICD-10-CM

## 2019-11-03 LAB
ALBUMIN SERPL ELPH-MCNC: 2.6 G/DL — LOW (ref 3.3–5)
ALP SERPL-CCNC: 586 U/L — HIGH (ref 40–120)
ALT FLD-CCNC: 53 U/L — HIGH (ref 10–45)
ANION GAP SERPL CALC-SCNC: 12 MMOL/L — SIGNIFICANT CHANGE UP (ref 5–17)
ANION GAP SERPL CALC-SCNC: 12 MMOL/L — SIGNIFICANT CHANGE UP (ref 5–17)
APTT BLD: 55.9 SEC — HIGH (ref 27.5–36.3)
AST SERPL-CCNC: 171 U/L — HIGH (ref 10–40)
BASOPHILS # BLD AUTO: 0 K/UL — SIGNIFICANT CHANGE UP (ref 0–0.2)
BASOPHILS NFR BLD AUTO: 0 % — SIGNIFICANT CHANGE UP (ref 0–2)
BILIRUB SERPL-MCNC: 3.1 MG/DL — HIGH (ref 0.2–1.2)
BUN SERPL-MCNC: 48 MG/DL — HIGH (ref 7–23)
BUN SERPL-MCNC: 50 MG/DL — HIGH (ref 7–23)
CALCIUM SERPL-MCNC: 9.7 MG/DL — SIGNIFICANT CHANGE UP (ref 8.4–10.5)
CALCIUM SERPL-MCNC: 9.7 MG/DL — SIGNIFICANT CHANGE UP (ref 8.4–10.5)
CHLORIDE SERPL-SCNC: 92 MMOL/L — LOW (ref 96–108)
CHLORIDE SERPL-SCNC: 93 MMOL/L — LOW (ref 96–108)
CO2 SERPL-SCNC: 23 MMOL/L — SIGNIFICANT CHANGE UP (ref 22–31)
CO2 SERPL-SCNC: 27 MMOL/L — SIGNIFICANT CHANGE UP (ref 22–31)
CREAT SERPL-MCNC: 0.91 MG/DL — SIGNIFICANT CHANGE UP (ref 0.5–1.3)
CREAT SERPL-MCNC: 0.99 MG/DL — SIGNIFICANT CHANGE UP (ref 0.5–1.3)
EOSINOPHIL # BLD AUTO: 0 K/UL — SIGNIFICANT CHANGE UP (ref 0–0.5)
EOSINOPHIL NFR BLD AUTO: 0 % — SIGNIFICANT CHANGE UP (ref 0–6)
GLUCOSE SERPL-MCNC: 124 MG/DL — HIGH (ref 70–99)
GLUCOSE SERPL-MCNC: 94 MG/DL — SIGNIFICANT CHANGE UP (ref 70–99)
HCT VFR BLD CALC: 28.9 % — LOW (ref 39–50)
HGB BLD-MCNC: 9.6 G/DL — LOW (ref 13–17)
IMM GRANULOCYTES NFR BLD AUTO: 1.6 % — HIGH (ref 0–1.5)
INR BLD: 3.72 RATIO — HIGH (ref 0.88–1.16)
LYMPHOCYTES # BLD AUTO: 0.18 K/UL — LOW (ref 1–3.3)
LYMPHOCYTES # BLD AUTO: 7.3 % — LOW (ref 13–44)
MCHC RBC-ENTMCNC: 28.8 PG — SIGNIFICANT CHANGE UP (ref 27–34)
MCHC RBC-ENTMCNC: 33.2 GM/DL — SIGNIFICANT CHANGE UP (ref 32–36)
MCV RBC AUTO: 86.8 FL — SIGNIFICANT CHANGE UP (ref 80–100)
MONOCYTES # BLD AUTO: 0.36 K/UL — SIGNIFICANT CHANGE UP (ref 0–0.9)
MONOCYTES NFR BLD AUTO: 14.7 % — HIGH (ref 2–14)
NEUTROPHILS # BLD AUTO: 1.87 K/UL — SIGNIFICANT CHANGE UP (ref 1.8–7.4)
NEUTROPHILS NFR BLD AUTO: 76.4 % — SIGNIFICANT CHANGE UP (ref 43–77)
NRBC # BLD: 2 /100 WBCS — HIGH (ref 0–0)
OB PNL STL: POSITIVE
PLATELET # BLD AUTO: 37 K/UL — LOW (ref 150–400)
POTASSIUM SERPL-MCNC: 4.2 MMOL/L — SIGNIFICANT CHANGE UP (ref 3.5–5.3)
POTASSIUM SERPL-MCNC: 6 MMOL/L — HIGH (ref 3.5–5.3)
POTASSIUM SERPL-SCNC: 4.2 MMOL/L — SIGNIFICANT CHANGE UP (ref 3.5–5.3)
POTASSIUM SERPL-SCNC: 6 MMOL/L — HIGH (ref 3.5–5.3)
PROT SERPL-MCNC: 5.7 G/DL — LOW (ref 6–8.3)
PROTHROM AB SERPL-ACNC: 44.2 SEC — HIGH (ref 10–12.9)
RBC # BLD: 3.33 M/UL — LOW (ref 4.2–5.8)
RBC # FLD: 19.2 % — HIGH (ref 10.3–14.5)
SODIUM SERPL-SCNC: 127 MMOL/L — LOW (ref 135–145)
SODIUM SERPL-SCNC: 132 MMOL/L — LOW (ref 135–145)
WBC # BLD: 2.45 K/UL — LOW (ref 3.8–10.5)
WBC # FLD AUTO: 2.45 K/UL — LOW (ref 3.8–10.5)

## 2019-11-03 PROCEDURE — 71045 X-RAY EXAM CHEST 1 VIEW: CPT | Mod: 26

## 2019-11-03 PROCEDURE — 99285 EMERGENCY DEPT VISIT HI MDM: CPT | Mod: GC

## 2019-11-03 PROCEDURE — 93308 TTE F-UP OR LMTD: CPT | Mod: 26

## 2019-11-03 PROCEDURE — 99223 1ST HOSP IP/OBS HIGH 75: CPT | Mod: GC

## 2019-11-03 PROCEDURE — 74176 CT ABD & PELVIS W/O CONTRAST: CPT | Mod: 26

## 2019-11-03 RX ORDER — SACUBITRIL AND VALSARTAN 24; 26 MG/1; MG/1
1 TABLET, FILM COATED ORAL
Refills: 0 | Status: DISCONTINUED | OUTPATIENT
Start: 2019-11-03 | End: 2019-11-04

## 2019-11-03 RX ORDER — CARVEDILOL PHOSPHATE 80 MG/1
6.25 CAPSULE, EXTENDED RELEASE ORAL EVERY 12 HOURS
Refills: 0 | Status: DISCONTINUED | OUTPATIENT
Start: 2019-11-03 | End: 2019-11-05

## 2019-11-03 RX ORDER — CHOLECALCIFEROL (VITAMIN D3) 125 MCG
5000 CAPSULE ORAL DAILY
Refills: 0 | Status: DISCONTINUED | OUTPATIENT
Start: 2019-11-03 | End: 2019-11-08

## 2019-11-03 RX ORDER — FUROSEMIDE 40 MG
80 TABLET ORAL ONCE
Refills: 0 | Status: COMPLETED | OUTPATIENT
Start: 2019-11-03 | End: 2019-11-03

## 2019-11-03 RX ORDER — ATORVASTATIN CALCIUM 80 MG/1
10 TABLET, FILM COATED ORAL AT BEDTIME
Refills: 0 | Status: DISCONTINUED | OUTPATIENT
Start: 2019-11-03 | End: 2019-11-06

## 2019-11-03 RX ORDER — ALLOPURINOL 300 MG
100 TABLET ORAL DAILY
Refills: 0 | Status: DISCONTINUED | OUTPATIENT
Start: 2019-11-03 | End: 2019-11-08

## 2019-11-03 RX ORDER — FINASTERIDE 5 MG/1
5 TABLET, FILM COATED ORAL DAILY
Refills: 0 | Status: DISCONTINUED | OUTPATIENT
Start: 2019-11-03 | End: 2019-11-08

## 2019-11-03 RX ORDER — POTASSIUM CHLORIDE 20 MEQ
1 PACKET (EA) ORAL
Qty: 0 | Refills: 0 | DISCHARGE

## 2019-11-03 RX ORDER — PREGABALIN 225 MG/1
100 CAPSULE ORAL DAILY
Refills: 0 | Status: DISCONTINUED | OUTPATIENT
Start: 2019-11-03 | End: 2019-11-03

## 2019-11-03 RX ORDER — FOLIC ACID 0.8 MG
0.5 TABLET ORAL DAILY
Refills: 0 | Status: DISCONTINUED | OUTPATIENT
Start: 2019-11-03 | End: 2019-11-08

## 2019-11-03 RX ADMIN — Medication 80 MILLIGRAM(S): at 14:55

## 2019-11-03 NOTE — ED PROVIDER NOTE - CLINICAL SUMMARY MEDICAL DECISION MAKING FREE TEXT BOX
loretta pt with ho chf with inc le edema on inc dose diuretic with inc sob, pt also with leev inr max at 8 off x 6 dasys co low back pain will rechk inr and ct non con r/o spon rp bleed -- bradley will aretha to be admitted for chf

## 2019-11-03 NOTE — H&P ADULT - ATTENDING COMMENTS
85 yo man PMH of chronic systolic heart failure with PPM and recent upgrade to BiV, valvular heart disease (s/p mechanical aortic valve replacement in 1999 and MV repair in 2006) on coumadin, CAD s/p CABG/Stent BMS x 2 to LAD, chronic atrial fibrillation, HLD, HTN, BPH, Gout presenting with dyspnea on exertion. Reviewed HPI and ROS with patient.  Vitals reviewed and physically examined patient at bedside. Patient well appearing and in no apparent distress. Agree with resident findings above.  Labs, imaging and EKG personally reviewed.  Dyspnea on exertion in setting of acute systolic heart failure with concomitant right pleural effusion. Continue with IV diuresis. Monitor I/O, Daily weights. Patient may likely benefit with heart failure consult in AM. TTE.   Cirrhosis: concern for cardiac etiology. Hepatology consult in AM. Check GGT. in setting of hyperbilirubinemia, elevated alkphose, transaminitis  Pancytopenia: Anemia normocytic. FOBT+ yet patient denies melena/BRBPR. Initiate anemia work up. GI consult.  Thrombocytopenia may be 2/2 to liver dysfunction/Cirrhosis. Trend. Consider Heme consult.   Remainder of plan as above.  Patient previously unknown to me and I was assigned to precept this case with housestaff resident, Dr. Du. My involvement in this case consisted only of the initial history, physical and management plan. Primary day team to assume care in AM.

## 2019-11-03 NOTE — ED ADULT NURSE NOTE - OBJECTIVE STATEMENT
Patient is an 85 y/o male c/o SOB and weakness. States this began around 9/13/19 when pacemaker was replaced. Patient left AMA a few days ago for elevated INR. Patient placed on BIPAP, tolerating well. States he has had cough for past week. Pt. denies CP, N/V/D, dizziness, chills, fever, numbness or tingling. A&Ox3. Breathing unlabored and spontaneously. Abdomen is soft, nontender and nondistended. Full ROM and strength in 4 extremities. Skin is dry and intact. +2 pitting edema of ankles bilaterally. Family at bedside. Safety and comfort measures provided. Patient is an 85 y/o male c/o increasing SOB and weakness. States this began around 9/13/19 when pacemaker was replaced. Patient left AMA a few days ago when seen for elevated INR, states he had cardiologists apt the next reason. Patient placed on BIPAP for increased work of breathing, tolerating well. States he has had cough for past week, nonproductive. Pt. denies CP, palpitations, abd pain, N/V/D, dizziness, chills, fever, numbness or tingling. A&Ox3. Breathing unlabored and spontaneously. Abdomen is soft, nontender and nondistended. Full ROM and strength in 4 extremities. Skin is dry and intact. +2 pitting edema of ankles bilaterally. Family at bedside. Safety and comfort measures provided.

## 2019-11-03 NOTE — ED ADULT NURSE REASSESSMENT NOTE - NS ED NURSE REASSESS COMMENT FT1
Patient has been taken off BIPAP. Placed on 4L nasal cannula and patient is tolerating well. VSS/NAD. Family at bedside.

## 2019-11-03 NOTE — H&P ADULT - PROBLEM SELECTOR PLAN 6
Mechanical aortic valve, takes Coumadin 4mg nightly, current INR >3.  - Hold Coumadin tonight, re-dose tomorrow based on INR

## 2019-11-03 NOTE — H&P ADULT - NSICDXPASTSURGICALHX_GEN_ALL_CORE_FT
PAST SURGICAL HISTORY:  S/P CABG x 2     S/P coronary artery stent placement x 2 (BMS to LAD)     s/p hernia repair x 2     s/p mechanical aortic valve replacement (1999)     S/P mitral valve repair (2006)     S/P rotator cuff surgery

## 2019-11-03 NOTE — H&P ADULT - PROBLEM SELECTOR PLAN 2
CT A/P revealing cirrhosis, found to have worsening thrombocytopenia and leukopenia, transaminitis with enlarged liver noted on exam. No history of liver disease in the past.   - monitor CMP daily  - no indications for platelet transfusion at this moment  - plan as above  - Given alk phos elevation, will obtain GGT CT A/P revealing cirrhosis, found to have worsening thrombocytopenia and leukopenia, transaminitis with enlarged liver noted on exam. No history of liver disease in the past. Worsening alk phos and bilirubin, low albumin.   - monitor CMP daily  - no indications for platelet transfusion at this moment  - Given alk phos elevation, will obtain GGT  - Hepatology c/s in the AM

## 2019-11-03 NOTE — ED PROVIDER NOTE - CARE PLAN
Principal Discharge DX:	Acute on chronic combined systolic and diastolic congestive heart failure  Secondary Diagnosis:	Coagulopathy  Secondary Diagnosis:	Pancytopenia  Secondary Diagnosis:	Transaminitis

## 2019-11-03 NOTE — H&P ADULT - PROBLEM SELECTOR PLAN 3
Positive FOBT, unclear why. Patient not taking NSAIDs, aspirin however has worsening thrombocytopenia. Unclear if ever had formal colonoscopy in the past.  - transfuse for Hb<8  - GI consult in the AM

## 2019-11-03 NOTE — ED PROVIDER NOTE - NS ED ROS FT
ROS: denies HA, weakness, dizziness, fevers/chills, nausea/vomiting, chest pain, diaphoresis, abdominal pain, diarrhea, joint pain, neuro deficits, dysuria/hematuria, rash    +SOB, leg swelling

## 2019-11-03 NOTE — H&P ADULT - PROBLEM SELECTOR PLAN 10
# Gout- c/w Allopurinol 100mg # Gout- c/w Allopurinol 100mg    Diet- Low sodium  DVT- Holding coumadin, on SCDs    Davie Huggins MD PGY-2  Department of Internal Medicine  Pager: [phone] (NS) /[phone] (MILAN)

## 2019-11-03 NOTE — H&P ADULT - PROBLEM SELECTOR PLAN 1
Patient presenting with dyspnea on exertion with dry cough and abdominal bloating and found to have b/l pleural effusions likely 2/2 CHF exacerbation. Unclear precipitating factor however suspect dietary changes (i.e high salt). Has been taking medications as prescribed, no recent illness. On admission, required bipap 2/2 dyspnea however now on 3L of NC with SpO2>92%. S/p 80mg IVP Lasix in the ED. Found to be hyponatremic to 127 on admission, repeat Na 132 s/p Lasix. Suspect hypervolemic hyponatremia. Bedside POCUS significant for B lines and large right sided pleural effusion. Chest x-ray showing loculated right pleural effusion. No hepatojugular reflex noted on exam.  - Lasix 40mg IVP BID  - Monitor strict I&Os, goal net negative 2-3L daily  - Daily weights  - will likely need RHC to formally evaluate right sided pressures  - TTE

## 2019-11-03 NOTE — H&P ADULT - NSHPPHYSICALEXAM_GEN_ALL_CORE
Vital Signs Last 24 Hrs  T(C): 37 (03 Nov 2019 20:20), Max: 37 (03 Nov 2019 20:20)  T(F): 98.6 (03 Nov 2019 20:20), Max: 98.6 (03 Nov 2019 20:20)  HR: 86 (03 Nov 2019 20:20) (69 - 86)  BP: 119/62 (03 Nov 2019 20:20) (92/54 - 120/69)  BP(mean): 82 (03 Nov 2019 15:03) (72 - 82)  RR: 20 (03 Nov 2019 20:20) (16 - 20)  SpO2: 98% (03 Nov 2019 20:20) (98% - 100%)    PHYSICAL EXAMINATION:  General: Alert and Awake, NAD  HEENT: PERRL, EOMI, No subconjunctival hemorrhages, Oropharynx Clear, MMM  Neck: Supple, No MT, no JVD noted  Cardiac: RRR, No M/R/G  Resp: Wheezing heard in the anterior chest, decreased breath sounds in the right lung fields, no crackles heard  Abdomen: noted HSM, NBS, NT/ND, No rigidity or guarding  MSK: No LE edema. No stigmata of IE. No evidence of phlebitis. No evidence of synovitis.  : No gonzalez  Skin: Chronic venous statis noted in the b/l LE, warm to the touch.  Neuro: Alert and Awake. CN 2-12 Grossly intact. Moves all four extremities spontaneously.  Psych: Calm, Pleasant, Cooperative

## 2019-11-03 NOTE — H&P ADULT - ASSESSMENT
86 YOM PMH of chronic systolic heart failure with PPM and recent upgrade to BiV, valvular heart disease (s/p mechanical aortic valve replacement in 1999 and MV repair in 2006) on coumadin INR goal 2.5 to 3, CAD s/p CABG/Stent BMS x 2 to LAD chronic atrial fibrillation, gout, HLD, HTN, BPH, presenting with dyspnea on exertion found to have b/l pleural effusions likely 2/2 CHF exacerbation

## 2019-11-03 NOTE — H&P ADULT - HISTORY OF PRESENT ILLNESS
88 YOM PMH of valvular heart disease (s/p mechanical aortic valve replacement in 1999 and MV repair in 2006) on coumadin INR goal 2.5 to 3, CAD s/p CABG/Stent BMS x 2 to LAD   chronic atrial fibrillation, gout, HLD, HTN, BPH, 86 YOM PMH of chronic systolic heart failure with PPM and recent upgrade to BiV, valvular heart disease (s/p mechanical aortic valve replacement in 1999 and MV repair in 2006) on coumadin INR goal 2.5 to 3, CAD s/p CABG/Stent BMS x 2 to LAD chronic atrial fibrillation, gout, HLD, HTN, BPH, presenting with dyspnea on exertion. Patient states he started developing shortness of breath a couple of days ago worse with exertion (walking). He says that at baseline he is able to walk 2,000-3,000 steps daily without limitations. Sleeps with 1 pillow at night as well as 1 pillow under his legs. Reports recent dry cough but no chest pain. Also reports abdominal bloating recently. No fevers, chills, recent illness, or travel history. Reports good compliance with his medications. Recently saw his Cardiologist who switched Lasix to Torsemide. He says he has felt weak and tired overall since his PPM was upgraded to a BiV device in September. He denies any history of bleeding disorders or Liver disease but does report having darker than usual stools recently. Had a colonoscopy done years ago which was normal.    97.8 F, HR 75, BP 92/54, RR 20, SpO2 99% on 4L of NC. Was first on Bipap initially.  Given 80mg IVP Lasix x1 85 YO M PMH of chronic systolic heart failure with PPM and recent upgrade to BiV, valvular heart disease (s/p mechanical aortic valve replacement in 1999 and MV repair in 2006) on coumadin INR goal 2.5 to 3, CAD s/p CABG/Stent BMS x 2 to LAD chronic atrial fibrillation, gout, HLD, HTN, BPH, presenting with dyspnea on exertion. Patient states he started developing shortness of breath a couple of days ago worse with exertion (walking). He says that at baseline he is able to walk 2,000-3,000 steps daily without limitations. Sleeps with 1 pillow at night as well as 1 pillow under his legs. Reports recent dry cough but no chest pain. Also reports abdominal bloating recently. No fevers, chills, recent illness, or travel history. Reports good compliance with his medications. Recently saw his Cardiologist who switched Lasix to Torsemide. He says he has felt weak and tired overall since his PPM was upgraded to a BiV device in September. He denies any history of bleeding disorders or Liver disease but does report having darker than usual stools recently. Had a colonoscopy done years ago which was normal.    97.8 F, HR 75, BP 92/54, RR 20, SpO2 99% on 4L of NC. Was first on Bipap initially.  Given 80mg IVP Lasix x1

## 2019-11-03 NOTE — ED ADULT NURSE NOTE - NS ED NURSE REPORT GIVEN DT
Qdoc note>    2 tooth implants 9 days ago with subsequent pains all over, feeling malaise, saw PCP with low BP and high HR.  Sent for further eval.  Has known low-grade NH lymphoma and h/o DM 03-Nov-2019 19:19

## 2019-11-03 NOTE — H&P ADULT - NSHPREVIEWOFSYSTEMS_GEN_ALL_CORE
CONSTITUTIONAL: Weakness  EYES/ENT: No visual changes;  No vertigo or throat pain   NECK: No pain or stiffness  RESPIRATORY: Shortness of breath and cough  CARDIOVASCULAR: No chest pain or palpitations  GASTROINTESTINAL: Reports abdominal bloating, no nausea, vomiting, or hematemesis; No diarrhea or constipation. No melena or hematochezia.  GENITOURINARY: No dysuria, frequency or hematuria  NEUROLOGICAL: No numbness or weakness  SKIN: No itching, rashes  MSK: no joint swelling or muscle aches  Endocrine: No dizziness, heat intolerance, hair thining CONSTITUTIONAL: Weakness  EYES/ENT: No visual changes;  No vertigo or throat pain   NECK: No pain or stiffness  RESPIRATORY: Shortness of breath and cough  CARDIOVASCULAR: No chest pain or palpitations  GASTROINTESTINAL: Reports abdominal bloating, no nausea, vomiting, or hematemesis; No diarrhea or constipation. No melena or hematochezia.  GENITOURINARY: No dysuria, frequency or hematuria  NEUROLOGICAL: No numbness or weakness  SKIN: No itching, rashes  MSK: no joint swelling or muscle aches  ENDOCRINE: No dizziness, heat intolerance, hair thining

## 2019-11-03 NOTE — ED PROVIDER NOTE - PROGRESS NOTE DETAILS
pocus biventric enlargeent - w poor ef- large rt pleural effsuion, small left pleural effusion , ivc plethoric with blines bl- will place n bipap and give more lasix - ntg held as bp is 110 systolic - may benefit from  pocus biventric enlargement - w poor ef- large rt pleural effusion, small left pleural effusion , ivc plethoric with blines bl- will place n bipap and give more lasix - ntg held as bp is 110 systolic - may benefit from  pancytopenia and transaminitis both present on old labs - however mild deterioration, as pt needs tele admission for chf - this lwill need further [place]keefe Bipap placed, patient now stable, off of bipap for few hours. Will admit to Dr. Mcgrath

## 2019-11-03 NOTE — ED PROVIDER NOTE - PHYSICAL EXAMINATION
Gen: NAD  Head: NCAT  HEENT: PERRL, MMM, normal conjunctiva, anicteric, neck supple  Lung: crackles lower bases  CV: RRR, no murmurs, rubs or gallops  Abd: soft, NTND, no rebound or guarding, no CVAT  MSK: pitting edema ankles bilaterally  Neuro: No focal neurologic deficits. CN II-XII grossly intact. 5/5 strength and normal sensation in all extremities.  Skin: Warm and dry, no evidence of rash  Psych: normal mood and affect

## 2019-11-03 NOTE — H&P ADULT - NSHPLABSRESULTS_GEN_ALL_CORE
9.6    2.45  )-----------( 37       ( 03 Nov 2019 14:52 )             28.9     11-03    127<L>  |  92<L>  |  48<H>  ----------------------------<  94  6.0<H>   |  23  |  0.91    Ca    9.7      03 Nov 2019 14:52    TPro  5.7<L>  /  Alb  2.6<L>  /  TBili  3.1<H>  /  DBili  x   /  AST  171<H>  /  ALT  53<H>  /  AlkPhos  586<H>  11-03    Occult blood positive    INR- 3.72    CT A/P IMPRESSION:     No retroperitoneal hematoma, as clinically questioned.    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    Marked cardiomegaly with congestive heart failure.    Interval worsening of moderate volume ascites.    Moderate right pleural effusion with compressive atelectasis.    POCUS- B lines, IVC plethoric, poor EF, large right pleural effusion, small left pleural effusion    Xray Chest 1 View- PORTABLE-Urgent (11.03.19 @ 16:33)     INTERPRETATION:  Similar appearing loculated right pleural effusion.   Indistinct vascular markings, likely secondary to pulmonary edema.   Cardiomegaly.    EKG- RBBB, v- paced Personally reviewed labs and noted in detail below: pancytopenia. elevated INR, transaminitis, elevated alk phos                9.6    2.45  )-----------( 37       ( 03 Nov 2019 14:52 )             28.9     11-03    132<L>  |  93<L>  |  50<H>  ----------------------------<  124<H>  4.2   |  27  |  0.99    Ca    9.7      03 Nov 2019 22:37    TPro  5.7<L>  /  Alb  2.6<L>  /  TBili  3.1<H>  /  DBili  x   /  AST  171<H>  /  ALT  53<H>  /  AlkPhos  586<H>  11-03      127<L>  |  92<L>  |  48<H>  ----------------------------<  94  6.0<H>   |  23  |  0.91    Ca    9.7      03 Nov 2019 14:52    TPro  5.7<L>  /  Alb  2.6<L>  /  TBili  3.1<H>  /  DBili  x   /  AST  171<H>  /  ALT  53<H>  /  AlkPhos  586<H>  11-03    Occult blood positive    INR- 3.72  < from: CT Abdomen and Pelvis No Cont (11.03.19 @ 16:48) >    IMPRESSION:     No retroperitoneal hematoma, as clinically questioned.    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    Marked cardiomegaly with congestive heart failure.    Interval worsening of moderate volume ascites.    Moderate right pleural effusion with compressive atelectasis.    < end of copied text >      Personally reviewed CXR: Right pleural effusion and pulmonary edema    Personally reviewed EKG- RBBB, v- paced

## 2019-11-03 NOTE — H&P ADULT - PROBLEM SELECTOR PLAN 7
History of chronic afib, on coumadin and coreg for rate control. Has PPM with BiV device.  - monitor on telemetry  - Hold warfain  - c/w Coreg 6.25 mg PO BID

## 2019-11-03 NOTE — ED PROVIDER NOTE - OBJECTIVE STATEMENT
85yo M with hx of CHF, HTN/HLD, CAD with exertional SOB. Is unable to walk blocks due to SOB. Has had CHF exacerbations in the past. No recall of blood loss from rectum. 85yo M with hx of CHF, HTN/HLD, CAD with exertional SOB. Is unable to walk blocks due to SOB. Has had CHF exacerbations in the past. No recall of blood loss from rectum.    loretta pt with recent ed  visit for high inr -- has held oumadin x several days no overt signs of bleeding --pt endosrs bl lower back pain no freq urgency or dusturia no numbness orweakness of le - no bladder or bowel incontinence pain moderate worse w movt -- pain sob worse w exertion has inc dose of diuretic -- no fever -pos chronic cough

## 2019-11-04 DIAGNOSIS — I50.9 HEART FAILURE, UNSPECIFIED: ICD-10-CM

## 2019-11-04 LAB
ALBUMIN SERPL ELPH-MCNC: 2.3 G/DL — LOW (ref 3.3–5)
ALP SERPL-CCNC: 501 U/L — HIGH (ref 40–120)
ALT FLD-CCNC: 37 U/L — SIGNIFICANT CHANGE UP (ref 10–45)
ANION GAP SERPL CALC-SCNC: 9 MMOL/L — SIGNIFICANT CHANGE UP (ref 5–17)
APTT BLD: 54.8 SEC — HIGH (ref 27.5–36.3)
AST SERPL-CCNC: 122 U/L — HIGH (ref 10–40)
BASOPHILS # BLD AUTO: 0.01 K/UL — SIGNIFICANT CHANGE UP (ref 0–0.2)
BASOPHILS NFR BLD AUTO: 0.4 % — SIGNIFICANT CHANGE UP (ref 0–2)
BILIRUB SERPL-MCNC: 2.5 MG/DL — HIGH (ref 0.2–1.2)
BLD GP AB SCN SERPL QL: NEGATIVE — SIGNIFICANT CHANGE UP
BUN SERPL-MCNC: 47 MG/DL — HIGH (ref 7–23)
CALCIUM SERPL-MCNC: 9.5 MG/DL — SIGNIFICANT CHANGE UP (ref 8.4–10.5)
CHLORIDE SERPL-SCNC: 94 MMOL/L — LOW (ref 96–108)
CO2 SERPL-SCNC: 28 MMOL/L — SIGNIFICANT CHANGE UP (ref 22–31)
CREAT SERPL-MCNC: 0.93 MG/DL — SIGNIFICANT CHANGE UP (ref 0.5–1.3)
EOSINOPHIL # BLD AUTO: 0 K/UL — SIGNIFICANT CHANGE UP (ref 0–0.5)
EOSINOPHIL NFR BLD AUTO: 0 % — SIGNIFICANT CHANGE UP (ref 0–6)
FOLATE SERPL-MCNC: 16.9 NG/ML — SIGNIFICANT CHANGE UP
GGT SERPL-CCNC: 393 U/L — HIGH (ref 9–50)
GLUCOSE SERPL-MCNC: 85 MG/DL — SIGNIFICANT CHANGE UP (ref 70–99)
HCT VFR BLD CALC: 22.3 % — LOW (ref 39–50)
HCT VFR BLD CALC: 23.7 % — LOW (ref 39–50)
HGB BLD-MCNC: 7.4 G/DL — LOW (ref 13–17)
HGB BLD-MCNC: 7.8 G/DL — LOW (ref 13–17)
IMM GRANULOCYTES NFR BLD AUTO: 0.7 % — SIGNIFICANT CHANGE UP (ref 0–1.5)
INR BLD: 3.34 RATIO — HIGH (ref 0.88–1.16)
IRON SATN MFR SERPL: 169 UG/DL — HIGH (ref 45–165)
IRON SATN MFR SERPL: 80 % — HIGH (ref 16–55)
LYMPHOCYTES # BLD AUTO: 0.14 K/UL — LOW (ref 1–3.3)
LYMPHOCYTES # BLD AUTO: 4.9 % — LOW (ref 13–44)
MAGNESIUM SERPL-MCNC: 1.8 MG/DL — SIGNIFICANT CHANGE UP (ref 1.6–2.6)
MCHC RBC-ENTMCNC: 29.1 PG — SIGNIFICANT CHANGE UP (ref 27–34)
MCHC RBC-ENTMCNC: 29.5 PG — SIGNIFICANT CHANGE UP (ref 27–34)
MCHC RBC-ENTMCNC: 32.9 GM/DL — SIGNIFICANT CHANGE UP (ref 32–36)
MCHC RBC-ENTMCNC: 33.2 GM/DL — SIGNIFICANT CHANGE UP (ref 32–36)
MCV RBC AUTO: 88.4 FL — SIGNIFICANT CHANGE UP (ref 80–100)
MCV RBC AUTO: 88.8 FL — SIGNIFICANT CHANGE UP (ref 80–100)
MONOCYTES # BLD AUTO: 0.49 K/UL — SIGNIFICANT CHANGE UP (ref 0–0.9)
MONOCYTES NFR BLD AUTO: 17.3 % — HIGH (ref 2–14)
NEUTROPHILS # BLD AUTO: 2.17 K/UL — SIGNIFICANT CHANGE UP (ref 1.8–7.4)
NEUTROPHILS NFR BLD AUTO: 76.7 % — SIGNIFICANT CHANGE UP (ref 43–77)
NRBC # BLD: 1 /100 WBCS — HIGH (ref 0–0)
NRBC # BLD: 2 /100 WBCS — HIGH (ref 0–0)
NT-PROBNP SERPL-SCNC: 2019 PG/ML — HIGH (ref 0–300)
PHOSPHATE SERPL-MCNC: 3.6 MG/DL — SIGNIFICANT CHANGE UP (ref 2.5–4.5)
PLATELET # BLD AUTO: 26 K/UL — LOW (ref 150–400)
PLATELET # BLD AUTO: 30 K/UL — LOW (ref 150–400)
POTASSIUM SERPL-MCNC: 4 MMOL/L — SIGNIFICANT CHANGE UP (ref 3.5–5.3)
POTASSIUM SERPL-SCNC: 4 MMOL/L — SIGNIFICANT CHANGE UP (ref 3.5–5.3)
PROT SERPL-MCNC: 4.6 G/DL — LOW (ref 6–8.3)
PROTHROM AB SERPL-ACNC: 39.2 SEC — HIGH (ref 10–13.1)
RBC # BLD: 2.51 M/UL — LOW (ref 4.2–5.8)
RBC # BLD: 2.68 M/UL — LOW (ref 4.2–5.8)
RBC # FLD: 19.4 % — HIGH (ref 10.3–14.5)
RBC # FLD: 19.8 % — HIGH (ref 10.3–14.5)
RH IG SCN BLD-IMP: POSITIVE — SIGNIFICANT CHANGE UP
SODIUM SERPL-SCNC: 131 MMOL/L — LOW (ref 135–145)
TIBC SERPL-MCNC: 211 UG/DL — LOW (ref 220–430)
UIBC SERPL-MCNC: 42 UG/DL — LOW (ref 110–370)
VIT B12 SERPL-MCNC: >2000 PG/ML — HIGH (ref 232–1245)
WBC # BLD: 2.81 K/UL — LOW (ref 3.8–10.5)
WBC # BLD: 2.83 K/UL — LOW (ref 3.8–10.5)
WBC # FLD AUTO: 2.81 K/UL — LOW (ref 3.8–10.5)
WBC # FLD AUTO: 2.83 K/UL — LOW (ref 3.8–10.5)

## 2019-11-04 PROCEDURE — 93281 PM DEVICE PROGR EVAL MULTI: CPT | Mod: 26

## 2019-11-04 PROCEDURE — 76705 ECHO EXAM OF ABDOMEN: CPT | Mod: 26,RT

## 2019-11-04 PROCEDURE — 99233 SBSQ HOSP IP/OBS HIGH 50: CPT | Mod: GC

## 2019-11-04 PROCEDURE — 93306 TTE W/DOPPLER COMPLETE: CPT | Mod: 26

## 2019-11-04 PROCEDURE — 99223 1ST HOSP IP/OBS HIGH 75: CPT | Mod: GC

## 2019-11-04 RX ORDER — SACUBITRIL AND VALSARTAN 24; 26 MG/1; MG/1
1 TABLET, FILM COATED ORAL
Qty: 0 | Refills: 0 | DISCHARGE

## 2019-11-04 RX ORDER — ALLOPURINOL 300 MG
1 TABLET ORAL
Qty: 0 | Refills: 0 | DISCHARGE

## 2019-11-04 RX ORDER — FUROSEMIDE 40 MG
40 TABLET ORAL DAILY
Refills: 0 | Status: DISCONTINUED | OUTPATIENT
Start: 2019-11-04 | End: 2019-11-04

## 2019-11-04 RX ORDER — FUROSEMIDE 40 MG
40 TABLET ORAL EVERY 12 HOURS
Refills: 0 | Status: DISCONTINUED | OUTPATIENT
Start: 2019-11-04 | End: 2019-11-06

## 2019-11-04 RX ORDER — ATORVASTATIN CALCIUM 80 MG/1
1 TABLET, FILM COATED ORAL
Qty: 0 | Refills: 0 | DISCHARGE

## 2019-11-04 RX ORDER — CEFTRIAXONE 500 MG/1
1000 INJECTION, POWDER, FOR SOLUTION INTRAMUSCULAR; INTRAVENOUS ONCE
Refills: 0 | Status: COMPLETED | OUTPATIENT
Start: 2019-11-04 | End: 2019-11-04

## 2019-11-04 RX ORDER — PANTOPRAZOLE SODIUM 20 MG/1
40 TABLET, DELAYED RELEASE ORAL
Refills: 0 | Status: DISCONTINUED | OUTPATIENT
Start: 2019-11-04 | End: 2019-11-09

## 2019-11-04 RX ORDER — FINASTERIDE 5 MG/1
1 TABLET, FILM COATED ORAL
Qty: 0 | Refills: 0 | DISCHARGE

## 2019-11-04 RX ORDER — FUROSEMIDE 40 MG
40 TABLET ORAL EVERY 12 HOURS
Refills: 0 | Status: DISCONTINUED | OUTPATIENT
Start: 2019-11-04 | End: 2019-11-04

## 2019-11-04 RX ORDER — PANTOPRAZOLE SODIUM 20 MG/1
40 TABLET, DELAYED RELEASE ORAL ONCE
Refills: 0 | Status: COMPLETED | OUTPATIENT
Start: 2019-11-04 | End: 2019-11-04

## 2019-11-04 RX ORDER — ACETAMINOPHEN 500 MG
650 TABLET ORAL EVERY 6 HOURS
Refills: 0 | Status: DISCONTINUED | OUTPATIENT
Start: 2019-11-04 | End: 2019-11-08

## 2019-11-04 RX ORDER — FOLIC ACID 0.8 MG
1 TABLET ORAL
Qty: 0 | Refills: 0 | DISCHARGE

## 2019-11-04 RX ORDER — WARFARIN SODIUM 2.5 MG/1
1 TABLET ORAL
Qty: 0 | Refills: 0 | DISCHARGE

## 2019-11-04 RX ORDER — FUROSEMIDE 40 MG
1 TABLET ORAL
Qty: 0 | Refills: 0 | DISCHARGE

## 2019-11-04 RX ORDER — CEFTRIAXONE 500 MG/1
INJECTION, POWDER, FOR SOLUTION INTRAMUSCULAR; INTRAVENOUS
Refills: 0 | Status: DISCONTINUED | OUTPATIENT
Start: 2019-11-04 | End: 2019-11-05

## 2019-11-04 RX ORDER — POTASSIUM CHLORIDE 20 MEQ
1 PACKET (EA) ORAL
Qty: 0 | Refills: 0 | DISCHARGE

## 2019-11-04 RX ORDER — CHOLECALCIFEROL (VITAMIN D3) 125 MCG
1 CAPSULE ORAL
Qty: 0 | Refills: 0 | DISCHARGE

## 2019-11-04 RX ORDER — CEFTRIAXONE 500 MG/1
1000 INJECTION, POWDER, FOR SOLUTION INTRAMUSCULAR; INTRAVENOUS EVERY 24 HOURS
Refills: 0 | Status: DISCONTINUED | OUTPATIENT
Start: 2019-11-05 | End: 2019-11-05

## 2019-11-04 RX ADMIN — Medication 0.5 MILLIGRAM(S): at 18:47

## 2019-11-04 RX ADMIN — Medication 40 MILLIGRAM(S): at 00:43

## 2019-11-04 RX ADMIN — FINASTERIDE 5 MILLIGRAM(S): 5 TABLET, FILM COATED ORAL at 18:48

## 2019-11-04 RX ADMIN — PANTOPRAZOLE SODIUM 40 MILLIGRAM(S): 20 TABLET, DELAYED RELEASE ORAL at 23:34

## 2019-11-04 RX ADMIN — Medication 100 MILLIGRAM(S): at 23:11

## 2019-11-04 RX ADMIN — CARVEDILOL PHOSPHATE 6.25 MILLIGRAM(S): 80 CAPSULE, EXTENDED RELEASE ORAL at 06:23

## 2019-11-04 RX ADMIN — CEFTRIAXONE 100 MILLIGRAM(S): 500 INJECTION, POWDER, FOR SOLUTION INTRAMUSCULAR; INTRAVENOUS at 21:04

## 2019-11-04 RX ADMIN — ATORVASTATIN CALCIUM 10 MILLIGRAM(S): 80 TABLET, FILM COATED ORAL at 23:10

## 2019-11-04 RX ADMIN — Medication 5000 UNIT(S): at 18:49

## 2019-11-04 RX ADMIN — PANTOPRAZOLE SODIUM 40 MILLIGRAM(S): 20 TABLET, DELAYED RELEASE ORAL at 18:49

## 2019-11-04 NOTE — PROGRESS NOTE ADULT - ATTENDING COMMENTS
I have seen and examined the patient with Mr. Bernard ( MARI) and resident Dr Loams in AM.  He had reported to have darker stool this AM and was on coumadin with supra theurapetic INR and had drop in Hb  monitor INR , CBC   GI consult  continue IV lasix  heart failure note appreciated   monitor on tele

## 2019-11-04 NOTE — PROGRESS NOTE ADULT - SUBJECTIVE AND OBJECTIVE BOX
JOSUE SOLOMON is a 86year old man who has been here for 1day(s).    ***ONE-LINER***  JUNIOR is a 86 year old man with a history of chronic systolic heart failure with recent upgrade to BiV, valvular heart disease (mechanical aortic, 1999; MV repair, 2006), on coumadin (INR goal 2.5 to 3), CAD with CABG and stents (BMSx2, LAD), chronic Afib, thrombocytopenia, gout, HLD, HTN, BPH. He is presenting here for increased dyspnea on exertion with bilateral pleural effusions, abdominal ascites, and peripheral edema in the context of a history of chronic heart failure, valvular heart disease, and CAD. This is most consistent with an acute exacerbation of his chronic systolic heart failure.     ***INTERVAL HPI/OVERNIGHT EVENTS***  Overnight, the patient had no acute events. He had 3 L of O2 via nasal cannula and reports no feelings of dyspnea at rest. Last night, he went for a walk and reported dyspnea during the walk. He notes that his fatigue seems to be worse. He notes that his abdominal distention feels comparable to days past. His pedal edema has improved subjectively. He has noted that his stools are darker, but this has been going on for several weeks.     He denies chest pain, abdominal pain, paplitations, headache, fever, changes in urination, dizziness, lightheadedness, nausea, vomiting, constipation, and diarrhea.     T(F): 97.8, Max: 98.6 (20:20)  HR: 70 (69 - 86)  BP: 85/55 (85/55 - 120/69)  RR: 20 (16 - 20)  SpO2: 100% (98% - 100%)    ***PHYSICAL EXAM***    GENERAL: NAD, sitting in chair comfortably. Well groomed.  HEAD:  Atraumatic, Normocephalic  EYES: EOMI, PERRLA, conjunctiva and sclera clear  ENT: Moist mucous membranes; patent nares, uvula midline  NECK: Supple, No JVD, no lymphadenopathy  CHEST/LUNG: Decreased breath sounds in right lung fields, slight crackles in both lung bases. On the  left, no rhonchi, wheezing, or rubs. Unlabored respirations  HEART: Regular rate and rhythm; No murmurs, rubs, or gallops  ABDOMEN: Distended, but not tense. Hepatomegaly. Bowel sounds present; Soft, Nontender.  EXTREMITIES:  2+ peripheral edema up to knees bilaterally. Stasis dermatitis. 2+ Peripheral Pulses, brisk capillary refill. No clubbing, cyanosis  NERVOUS SYSTEM:  Alert & Oriented X3, speech clear. No deficits. CNII-XII grossly intact.  MSK: FROM all 4 extremities, full and equal strength  SKIN: No rashes or lesions      11-03-19 @ 07:01  -  11-04-19 @ 07:00  --------------------------------------------------------  IN: 240 mL / OUT: 200 mL / NET: 40 mL      ***LABORATORY STUDIES***                        7.8    2.83  )-----------( 30       ( 04 Nov 2019 08:16 )             23.7     11-04    131<L>  |  94<L>  |  47<H>  ------------------------------------------------------------<  85  4.0             |  28            |  0.93    Ca    9.5      04 Nov 2019 06:30  Phos  3.6     11-04  Mg     1.8     11-04    TPro  4.6<L>  /  Alb  2.3<L>  /  TBili  2.5<H>  /  DBili  x   /  AST  122<H>  /  ALT  37  /  AlkPhos  501<H>  11-04    (11-04)  39.2<H>  --------------< 3.34<H>  54.8<H>    Occult Blood, Feces: Positive (11.03.19 @ 18:53)      Iron - Total Binding Capacity.: 211 ug/dL    % Saturation, Iron: 80 %    Iron Total, Serum: 169 ug/dL    Unsaturated Iron Binding Capacity: 42 ug/dL    Gamma Glutamyl Transferase, Serum: 393 U/L (11.04.19 @ 08:07)    Serum Pro-Brain Natriuretic Peptide: 2019 pg/mL (11.04.19 @ 06:30)    ***IMAGING***  < from: CT Abdomen and Pelvis No Cont (11.03.19 @ 16:48) >  LOWER CHEST: Cardiomegaly with significant enlargement of the left   atrium.Aortic valve replacement. Cardiac pacer in stable position.   Atherosclerotic calcifications of coronary arteries and thoracic aorta.   Large right pleural effusion with passive and rounded atelectasis.   Bibasilar groundglass opacities and interlobular septal thickening in   keeping with pulmonary edema.    LIVER: Cirrhotic morphology.  BILE DUCTS: Normal caliber.  GALLBLADDER: Cholelithiasis.  SPLEEN: Within normal limits.  PANCREAS: Within normal limits.  ADRENALS: Within normal limits.  KIDNEYS/URETERS: Bilateral renal cysts. No hydronephrosis. Bilateral   renal cortical scarring.    BLADDER: Within normal limits.  REPRODUCTIVE ORGANS: Prostate within normal limits.    BOWEL: No bowel obstruction. Appendix is normal.  PERITONEUM: Moderatevolume ascites. Right lower quadrant fluid   collection (3, 91) stable since 2012.  VESSELS: Atherosclerotic changes.  RETROPERITONEUM/LYMPH NODES: No retroperitoneal hematoma. Multiple short   segment retrosternal lymph nodes for reference a left para-aortic lymph   node measuring 1.1 x 1.0 cm.    ABDOMINAL WALL: Within normal limits.  BONES: Degenerative changes.    IMPRESSION:     No retroperitoneal hematoma, as clinically questioned.    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    Marked cardiomegaly with congestive heart failure.    Interval worsening of moderate volume ascites.    Moderate right pleural effusion with compressive atelectasis.    < end of copied text >    < from: US TTE 2D F/U, Limited w/o Contrast (ED) (11.03.19 @ 15:12) >  INTERPRETATION:  A focused transthoracic cardiac ultrasound examination   was performed.   No pericardial effusion was present.  No global wall motion abnormality was identified  biventricular dilatation  PPM wire seen   moderate right pleural effusion, small left pleural effusion  B lines bilaterally  IVC is plethoric     IMPRESSION:   No Pericardial Effusion  < end of copied text >    < from: Xray Chest 1 View- PORTABLE-Urgent (11.03.19 @ 16:33) >  Redemonstrated loculated right pleural effusion, unchanged. No   pneumothorax.    Stable cardiomegaly. Status post median sternotomy, aortic valve   replacement and mitral valve repair. Left chest wall dual chamber   pacemaker.    No acute osseous abnormality.      IMPRESSION:     Left basilar linear atelectasis. Loculated right pleural effusion,   unchanged.    < end of copied text >      ***MEDICATIONS***  allopurinol 100 milliGRAM(s) Oral daily  atorvastatin 10 milliGRAM(s) Oral at bedtime  carvedilol 6.25 milliGRAM(s) Oral every 12 hours  cholecalciferol 5000 Unit(s) Oral daily  cyanocobalamin 100 MICROGram(s) 100 MICROGram(s) Oral daily  finasteride 5 milliGRAM(s) Oral daily  folic acid 0.5 milliGRAM(s) Oral daily  furosemide   Injectable 40 milliGRAM(s) IV Push daily  sacubitril 24 mG/valsartan 26 mG 1 Tablet(s) Oral two times a day      HEALTH ISSUES - PROBLEM Dx:  Preventive measure: Preventive measure  BPH (benign prostatic hyperplasia): BPH (benign prostatic hyperplasia)  Dyslipidemia: Dyslipidemia  Atrial fibrillation: Atrial fibrillation  Valvular heart disease: Valvular heart disease  CAD (coronary artery disease): CAD (coronary artery disease)  HTN (hypertension): HTN (hypertension)  Fecal occult blood test positive: Fecal occult blood test positive  Cardiac cirrhosis: Cardiac cirrhosis  Acute on chronic combined systolic and diastolic congestive heart failure: Acute on chronic combined systolic and diastolic congestive heart failure

## 2019-11-04 NOTE — CONSULT NOTE ADULT - SUBJECTIVE AND OBJECTIVE BOX
HPI:  85 YO M PMH of chronic systolic heart failure with PPM and recent upgrade to BiV, valvular heart disease (s/p mechanical aortic valve replacement in 1999 and MV repair in 2006) on coumadin INR goal 2.5 to 3, CAD s/p CABG/Stent BMS x 2 to LAD chronic atrial fibrillation, gout, HLD, HTN, BPH, presenting with dyspnea on exertion. Patient states he started developing shortness of breath a couple of days ago worse with exertion (walking). He says that at baseline he is able to walk 2,000-3,000 steps daily without limitations. Sleeps with 1 pillow at night as well as 1 pillow under his legs. Reports recent dry cough but no chest pain. Also reports abdominal bloating recently. No fevers, chills, recent illness, or travel history. Reports good compliance with his medications. Recently saw his Cardiologist who switched Lasix to Torsemide. He says he has felt weak and tired overall since his PPM was upgraded to a BiV device in September. He denies any history of bleeding disorders or Liver disease but does report having darker than usual stools recently. Had a colonoscopy done years ago which was normal.    97.8 F, HR 75, BP 92/54, RR 20, SpO2 99% on 4L of NC. Was first on Bipap initially.  Given 80mg IVP Lasix x1 (03 Nov 2019 22:47)      PAST MEDICAL & SURGICAL HISTORY:  HTN (hypertension)  Dyslipidemia  CAD (coronary atherosclerotic disease) s/p CABG/Stent BMS x 2 to LAD  BPH (benign prostatic hyperplasia)  Atrial fibrillation  S/P rotator cuff surgery  s/p hernia repair x 2  S/P coronary artery stent placement x 2 (BMS to LAD)  S/P CABG x 2  S/P mitral valve repair (2006)  s/p mechanical aortic valve replacement (1999)    REVIEW OF SYSTEMS:  CONSTITUTIONAL: + weakness, no fevers or chills  EYES/ENT: No visual changes;  No vertigo or throat pain   NECK: No pain or stiffness  RESPIRATORY: + cough, + hoarseness, no wheezing, no hemoptysis; + shortness of breath  CARDIOVASCULAR: No chest pain or palpitations  GASTROINTESTINAL: No abdominal or epigastric pain. No nausea, vomiting, or hematemesis; No diarrhea, + constipation. No melena or hematochezia.  GENITOURINARY: No dysuria, + frequency (on lasix), no hematuria  NEUROLOGICAL: No numbness or weakness  SKIN: No itching, rashes    Home Medications:  allopurinol 100 mg oral tablet: 1 tab(s) orally once a day  (04 Nov 2019 02:01)  atorvastatin 10 mg oral tablet: 1 tab(s) orally once a day  (04 Nov 2019 02:01)  Coreg 6.25 mg oral tablet: 1 tab(s) orally 2 times a day (04 Nov 2019 02:01)  Entresto 24 mg-26 mg oral tablet: 1 tab(s) orally 2 times a day (04 Nov 2019 02:01)  finasteride 5 mg oral tablet: 1 tab(s) orally once a day (04 Nov 2019 02:01)  folic acid 0.4 mg oral tablet: 1 tab(s) orally once a day (04 Nov 2019 02:01)  Vitamin B-12 100 mcg oral tablet: 1 tab(s) orally once a day (04 Nov 2019 02:01)  Vitamin D3 5000 intl units oral tablet: 1 tab(s) orally once a day (04 Nov 2019 02:01)  warfarin 4 mg oral tablet: 1 tab(s) orally once a day (04 Nov 2019 02:01)    Family history:   CAD in both parents (at age >80).  HTN in both parents    Social history:  Lives at home with wife.   Smoke cigars bid for 20 years. Quit in 1999. Denies smoking cigarettes, but was exposed to second hand smoke from wife.  Occasional EtOH use (2-3 drinks per week).  Denies use of recreational drugs.       MEDICATIONS  (STANDING):  allopurinol 100 milliGRAM(s) Oral daily  atorvastatin 10 milliGRAM(s) Oral at bedtime  carvedilol 6.25 milliGRAM(s) Oral every 12 hours  cholecalciferol 5000 Unit(s) Oral daily  cyanocobalamin 100 MICROGram(s) 100 MICROGram(s) Oral daily  finasteride 5 milliGRAM(s) Oral daily  folic acid 0.5 milliGRAM(s) Oral daily  furosemide   Injectable 40 milliGRAM(s) IV Push daily  sacubitril 24 mG/valsartan 26 mG 1 Tablet(s) Oral two times a day    MEDICATIONS  (PRN):      Allergies    No Known Allergies    Intolerances        SOCIAL HISTORY:    FAMILY HISTORY:  No pertinent family history in first degree relatives      Vital Signs Last 24 Hrs  T(C): 36.7 (04 Nov 2019 13:20), Max: 37 (03 Nov 2019 20:20)  T(F): 98.1 (04 Nov 2019 13:20), Max: 98.6 (03 Nov 2019 20:20)  HR: 69 (04 Nov 2019 13:20) (69 - 86)  BP: 98/60 (04 Nov 2019 13:20) (85/55 - 120/69)  BP(mean): 82 (03 Nov 2019 15:03) (72 - 82)  RR: 18 (04 Nov 2019 13:20) (16 - 20)  SpO2: 100% (04 Nov 2019 13:20) (98% - 100%)    PHYSICAL EXAM:  GENERAL: Frail elderly male, pleasant, mentating well  HEAD:  Atraumatic, Normocephalic  EYES: EOMI, conjunctiva and sclera clear  NECK: Supple, No JVD  CHEST/LUNG: Decreased breath sounds bilaterally; mild wheezing bilaterally, no ronchi or rales  HEART: Regular rate and rhythm; No murmurs, rubs, or gallops  ABDOMEN: Soft, Nontender, distended; Bowel sounds present,   EXTREMITIES:  2+ Peripheral Pulses, No clubbing, cyanosis, 1+ pitting edema up to mid shins, cold extremities  NEUROLOGY: non-focal  SKIN: No rashes or lesions      LABS:                        7.8    2.83  )-----------( 30       ( 04 Nov 2019 08:16 )             23.7     11-04    131<L>  |  94<L>  |  47<H>  ----------------------------<  85  4.0   |  28  |  0.93    Ca    9.5      04 Nov 2019 06:30  Phos  3.6     11-04  Mg     1.8     11-04    TPro  4.6<L>  /  Alb  2.3<L>  /  TBili  2.5<H>  /  DBili  x   /  AST  122<H>  /  ALT  37  /  AlkPhos  501<H>  11-04    PT/INR - ( 04 Nov 2019 08:42 )   PT: 39.2 sec;   INR: 3.34 ratio         PTT - ( 04 Nov 2019 08:42 )  PTT:54.8 sec      RADIOLOGY & ADDITIONAL STUDIES:    CT a/p  IMPRESSION:     No retroperitoneal hematoma, as clinically questioned.    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    Marked cardiomegaly with congestive heart failure.    Interval worsening of moderate volume ascites.    Moderate right pleural effusion with compressive atelectasis. HPI:  87 YO M PMH of chronic systolic heart failure with PPM and recent upgrade to BiV, valvular heart disease (s/p mechanical aortic valve replacement in 1999 and MV repair in 2006) on coumadin INR goal 2.5 to 3, CAD s/p CABG/Stent BMS x 2 to LAD chronic atrial fibrillation, gout, HLD, HTN, BPH, presenting with dyspnea on exertion. Patient states he started developing shortness of breath a couple of days ago worse with exertion (walking). He says that at baseline he is able to walk 2,000-3,000 steps daily without limitations. Sleeps with 1 pillow at night as well as 1 pillow under his legs. Reports recent dry cough but no chest pain. Also reports abdominal bloating recently. No fevers, chills, recent illness, or travel history. Reports good compliance with his medications. Recently saw his Cardiologist who switched Lasix to Torsemide. He says he has felt weak and tired overall since his PPM was upgraded to a BiV device in September. He denies any history of bleeding disorders or Liver disease but does report having darker than usual stools recently. Had a colonoscopy done years ago which was normal.    Vitals in the ED were: 97.8 F, HR 75, BP 92/54, RR 20, SpO2 99% on 4L of NC. Was on Bipap initially.  Given 80mg IVP Lasix x1 (03 Nov 2019 22:47)      PAST MEDICAL & SURGICAL HISTORY:  HTN (hypertension)  Dyslipidemia  CAD (coronary atherosclerotic disease) s/p CABG/Stent BMS x 2 to LAD  BPH (benign prostatic hyperplasia)  Atrial fibrillation  S/P rotator cuff surgery  s/p hernia repair x 2  S/P coronary artery stent placement x 2 (BMS to LAD)  S/P CABG x 2  S/P mitral valve repair (2006)  s/p mechanical aortic valve replacement (1999)    REVIEW OF SYSTEMS:  CONSTITUTIONAL: + weakness, no fevers or chills  EYES/ENT: No visual changes;  No vertigo or throat pain   NECK: No pain or stiffness  RESPIRATORY: + cough, + hoarseness, no wheezing, no hemoptysis; + shortness of breath  CARDIOVASCULAR: No chest pain or palpitations  GASTROINTESTINAL: No abdominal or epigastric pain. No nausea, vomiting, or hematemesis; No diarrhea, + constipation. No melena or hematochezia.  GENITOURINARY: No dysuria, + frequency (on lasix), no hematuria  NEUROLOGICAL: No numbness or weakness  SKIN: No itching, rashes    Home Medications:  allopurinol 100 mg oral tablet: 1 tab(s) orally once a day  (04 Nov 2019 02:01)  atorvastatin 10 mg oral tablet: 1 tab(s) orally once a day  (04 Nov 2019 02:01)  Coreg 6.25 mg oral tablet: 1 tab(s) orally 2 times a day (04 Nov 2019 02:01)  Entresto 24 mg-26 mg oral tablet: 1 tab(s) orally 2 times a day (04 Nov 2019 02:01)  finasteride 5 mg oral tablet: 1 tab(s) orally once a day (04 Nov 2019 02:01)  folic acid 0.4 mg oral tablet: 1 tab(s) orally once a day (04 Nov 2019 02:01)  Vitamin B-12 100 mcg oral tablet: 1 tab(s) orally once a day (04 Nov 2019 02:01)  Vitamin D3 5000 intl units oral tablet: 1 tab(s) orally once a day (04 Nov 2019 02:01)  warfarin 4 mg oral tablet: 1 tab(s) orally once a day (04 Nov 2019 02:01)    Family history:   CAD in both parents (at age >80).  HTN in both parents    Social history:  Lives at home with wife.   Smoke cigars bid for 20 years. Quit in 1999. Denies smoking cigarettes, but was exposed to second hand smoke from wife.  Occasional EtOH use (2-3 drinks per week).  Denies use of recreational drugs.       MEDICATIONS  (STANDING):  allopurinol 100 milliGRAM(s) Oral daily  atorvastatin 10 milliGRAM(s) Oral at bedtime  carvedilol 6.25 milliGRAM(s) Oral every 12 hours  cholecalciferol 5000 Unit(s) Oral daily  cyanocobalamin 100 MICROGram(s) 100 MICROGram(s) Oral daily  finasteride 5 milliGRAM(s) Oral daily  folic acid 0.5 milliGRAM(s) Oral daily  furosemide   Injectable 40 milliGRAM(s) IV Push daily  sacubitril 24 mG/valsartan 26 mG 1 Tablet(s) Oral two times a day    MEDICATIONS  (PRN):      Allergies    No Known Allergies    Intolerances        SOCIAL HISTORY:    FAMILY HISTORY:  No pertinent family history in first degree relatives      Vital Signs Last 24 Hrs  T(C): 36.7 (04 Nov 2019 13:20), Max: 37 (03 Nov 2019 20:20)  T(F): 98.1 (04 Nov 2019 13:20), Max: 98.6 (03 Nov 2019 20:20)  HR: 69 (04 Nov 2019 13:20) (69 - 86)  BP: 98/60 (04 Nov 2019 13:20) (85/55 - 120/69)  BP(mean): 82 (03 Nov 2019 15:03) (72 - 82)  RR: 18 (04 Nov 2019 13:20) (16 - 20)  SpO2: 100% (04 Nov 2019 13:20) (98% - 100%)    PHYSICAL EXAM:  GENERAL: Frail elderly male, pleasant, mentating well  HEAD:  Atraumatic, Normocephalic  EYES: EOMI, conjunctiva and sclera clear  NECK: Supple, JVD elevated up to jaw when seated at 45 degrees.  CHEST/LUNG: Decreased breath sounds bilaterally; mild wheezing bilaterally, no ronchi or rales  HEART: Regular rate and rhythm; No murmurs, rubs, or gallops  ABDOMEN: Soft, Nontender, distended; Bowel sounds present,   EXTREMITIES:  2+ Peripheral Pulses, No clubbing, cyanosis, 1+ pitting edema up to mid shins, cold extremities  NEUROLOGY: non-focal  SKIN: No rashes or lesions      LABS:                        7.8    2.83  )-----------( 30       ( 04 Nov 2019 08:16 )             23.7     11-04    131<L>  |  94<L>  |  47<H>  ----------------------------<  85  4.0   |  28  |  0.93    Ca    9.5      04 Nov 2019 06:30  Phos  3.6     11-04  Mg     1.8     11-04    TPro  4.6<L>  /  Alb  2.3<L>  /  TBili  2.5<H>  /  DBili  x   /  AST  122<H>  /  ALT  37  /  AlkPhos  501<H>  11-04    PT/INR - ( 04 Nov 2019 08:42 )   PT: 39.2 sec;   INR: 3.34 ratio         PTT - ( 04 Nov 2019 08:42 )  PTT:54.8 sec      RADIOLOGY & ADDITIONAL STUDIES:    CT a/p  IMPRESSION:     No retroperitoneal hematoma, as clinically questioned.    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    Marked cardiomegaly with congestive heart failure.    Interval worsening of moderate volume ascites.    Moderate right pleural effusion with compressive atelectasis.

## 2019-11-04 NOTE — PROGRESS NOTE ADULT - ASSESSMENT
Patient is a 86y old  Male who presents with a chief complaint of device upgrade (13 Sep 2019 16:30) now s/p BIV upgrade viw LACW. Pt tolerated the procedure well, swollen  plus positive ecchymosis. Post-procedure discharge instructions discussed. Discharge pending AG is a 86 year old man with a history of chronic systolic heart failure with recent upgrade to BiV, valvular heart disease (mechanical aortic, 1999; MV repair, 2006), on coumadin (INR goal 2.5 to 3), CAD with CABG and stents (BMSx2, LAD), chronic Afib, thrombocytopenia, gout, HLD, HTN, BPH.    He is presenting here for increased dyspnea on exertion with bilateral pleural effusions, abdominal ascites, and peripheral edema in the context of a history of chronic heart failure, valvular heart disease, and CAD. This is most consistent with an acute exacerbation of his chronic systolic heart failure. The patient has CT and laboratory evidence of cirrhosis, likely secondary to his CHF exacerbation. The patient is responding well to IV diuresis at this point.

## 2019-11-04 NOTE — CONSULT NOTE ADULT - SUBJECTIVE AND OBJECTIVE BOX
Chief Complaint:  SOB    HPI:  87 YO M PMH of chronic systolic heart failure with PPM and recent upgrade to BiV, valvular heart disease (s/p mechanical aortic valve replacement in 1999 and MV repair in 2006) on coumadin INR goal 2.5 to 3, CAD s/p CABG/Stent BMS x 2 to LAD chronic atrial fibrillation, gout, HLD, HTN, BPH, presenting with dyspnea on exertion.     Patient states he started developing shortness of breath a couple of days ago worse with exertion (walking). He says that at baseline he has very limited physical activity due to SOB and back pain.  No fevers, chills, recent illness, or travel history. Recently saw his Cardiologist who switched Lasix to Torsemide. He says he has felt weak and tired overall since his PPM was upgraded to a BiV device in September. He denies any history of bleeding disorders or Liver disease but does report having darker than usual stools recently.   Had a colonoscopy done years ago which was normal.  He also took 2 tablets of Meloxicam for his back pain. He reports constipation at his baseline with BRBPR whenever he strain to have bm but nothing recently.  He is known to have hemorrhoids.    In ER BP 92/54, RR 20, SpO2 99% on 4L of NC. Was first on Bipap initially. Given 80mg IVP Lasix x1  He was noted to have cirrhosis on imaging, FOBT came back positive with acute drop in his H&H so Hepatology consult was called.    Allergies:  No Known Allergies    Home Medications:   * Patient Currently Takes Medications as of 04-Nov-2019 02:01 documented in Structured Notes  · 	Entresto 24 mg-26 mg oral tablet: Last Dose Taken:  , 1 tab(s) orally 2 times a day  · 	folic acid 0.4 mg oral tablet: Last Dose Taken:  , 1 tab(s) orally once a day  · 	Vitamin D3 5000 intl units oral tablet: Last Dose Taken:  , 1 tab(s) orally once a day  · 	warfarin 4 mg oral tablet: Last Dose Taken:  , 1 tab(s) orally once a day  · 	finasteride 5 mg oral tablet: Last Dose Taken:  , 1 tab(s) orally once a day  · 	Coreg 6.25 mg oral tablet: Last Dose Taken:  , 1 tab(s) orally 2 times a day  · 	Vitamin B-12 100 mcg oral tablet: Last Dose Taken:  , 1 tab(s) orally once a day  · 	atorvastatin 10 mg oral tablet: Last Dose Taken:  , 1 tab(s) orally once a day   · 	allopurinol 100 mg oral tablet: Last Dose Taken:  , 1 tab(s) orally once a day   · 	torsemide 20 mg oral tablet: Last Dose Taken:  , 1 tab(s) orally once a day, As Needed      Hospital Medications:  acetaminophen   Tablet .. 650 milliGRAM(s) Oral every 6 hours PRN  allopurinol 100 milliGRAM(s) Oral daily  atorvastatin 10 milliGRAM(s) Oral at bedtime  carvedilol 6.25 milliGRAM(s) Oral every 12 hours  cholecalciferol 5000 Unit(s) Oral daily  cyanocobalamin 100 MICROGram(s) 100 MICROGram(s) Oral daily  finasteride 5 milliGRAM(s) Oral daily  folic acid 0.5 milliGRAM(s) Oral daily  furosemide   Injectable 40 milliGRAM(s) IV Push every 12 hours      PMHX/PSHX:  HTN (hypertension)  Dyslipidemia  CHF (congestive heart failure) EF = 49%  CAD (coronary atherosclerotic disease) s/p CABG/Stent BMS x 2 to LAD  BPH (benign prostatic hyperplasia)  Atrial fibrillation  S/P rotator cuff surgery  s/p hernia repair x 2  S/P coronary artery stent placement x 2 (BMS to LAD)  S/P CABG x 2  S/P mitral valve repair (2006)  s/p mechanical aortic valve replacement (1999)      Family history:  No pertinent family history of colon CA      Social History: no smoking or alcohol abuse    ROS:   General:  No fevers, chills or night sweats.  ENT:  No sore throat or dysphagia  CV:  No pain or palpitations  Resp: + dyspnea, no cough, wheezing  GI:  as above  Skin:  No rash or edema      PHYSICAL EXAM:   GENERAL: mild respiratory distress   HEENT:  NC/AT,  conjunctivae clear and pink, sclera -anicteric, elevated JVP  CHEST:  bibasilar crackles   HEART:  S1/S2, murmur   ABDOMEN:  Soft, non-tender, non-distended, normoactive bowel sounds,  no masses   EXTREMITIES:  No cyanosis or Edema  SKIN:  Warm & Dry. No rash or erythema  NEURO:  Alert, oriented  Rectal: dark brown    Vital Signs:  Vital Signs Last 24 Hrs  T(C): 36.7 (04 Nov 2019 13:20), Max: 37 (03 Nov 2019 20:20)  T(F): 98.1 (04 Nov 2019 13:20), Max: 98.6 (03 Nov 2019 20:20)  HR: 69 (04 Nov 2019 13:20) (69 - 86)  BP: 98/60 (04 Nov 2019 13:20) (85/55 - 119/62)  BP(mean): --  RR: 18 (04 Nov 2019 13:20) (18 - 20)  SpO2: 100% (04 Nov 2019 13:20) (98% - 100%)  Daily     Daily     LABS:                        7.8    2.83  )-----------( 30       ( 04 Nov 2019 08:16 )             23.7     Mean Cell Volume: 88.4 fl (11-04-19 @ 08:16)    11-04    131<L>  |  94<L>  |  47<H>  ----------------------------<  85  4.0   |  28  |  0.93    Ca    9.5      04 Nov 2019 06:30  Phos  3.6     11-04  Mg     1.8     11-04    TPro  4.6<L>  /  Alb  2.3<L>  /  TBili  2.5<H>  /  DBili  x   /  AST  122<H>  /  ALT  37  /  AlkPhos  501<H>  11-04    LIVER FUNCTIONS - ( 04 Nov 2019 08:07 )  Alb: x     / Pro: x     / ALK PHOS: x     / ALT: x     / AST: x     / GGT: 393 U/L       PT/INR - ( 04 Nov 2019 08:42 )   PT: 39.2 sec;   INR: 3.34 ratio         PTT - ( 04 Nov 2019 08:42 )  PTT:54.8 sec                            7.8    2.83  )-----------( 30       ( 04 Nov 2019 08:16 )             23.7                         9.6    2.45  )-----------( 37       ( 03 Nov 2019 14:52 )             28.9     Imaging:    < from: US Abdomen Upper Quadrant Right (11.04.19 @ 15:18) >  EXAM:  US ABDOMEN RT UPR QUADRANT                            PROCEDURE DATE:  11/04/2019            INTERPRETATION:  CLINICAL INFORMATION: Patient with decompensated heart   failure and transaminitis. Evaluate for cardiac cirrhosis.    COMPARISON: CT abdomen and pelvis 11/3/2019    TECHNIQUE: Sonography of the right upper quadrant.     FINDINGS:    Liver: Cirrhosis.    Bile ducts: Normal caliber. Common bile duct measures 3 mm.     Gallbladder: Cholelithiasis.        Pancreas: Not well visualized.    Right kidney: 11.3 cm. No hydronephrosis. Cysts measuring up to 5.4 cm at   the upper pole.    Ascites: Trace perihepatic.    IVC: Dilated intrahepatic IVC measuring up to 3.1 cm and prominent   intrahepatic veins, may be seen in the setting ofright-sided heart   failure.    A right pleural effusion.    Prominent periportal nodes with a reference measuring 3.6 x 0.9 x 2.2 cm.    IMPRESSION:     Cirrhosis.    Dilated IVC and prominent intrahepatic veins, may be seen in the setting   of right-sided heart failure.    Cholelithiasis.    Trace ascites.    A right pleural effusion.    < end of copied text >    < from: CT Abdomen and Pelvis No Cont (11.03.19 @ 16:48) >  XAM:  CT ABDOMEN AND PELVIS                            PROCEDURE DATE:  11/03/2019            INTERPRETATION:  CLINICAL INFORMATION: Evaluate for retroperitoneal bleed.    COMPARISON: CT abdomen and pelvis 10/31/2019    PROCEDURE:   CT of the Abdomen and Pelvis was performed without intravenous contrast.   Intravenous contrast: None.  Oral contrast: None.  Sagittal and coronal reformats were performed.    FINDINGS:    LOWER CHEST: Cardiomegaly with significant enlargement of the left   atrium.Aortic valve replacement. Cardiac pacer in stable position.   Atherosclerotic calcifications of coronary arteries and thoracic aorta.   Large right pleural effusion with passive and rounded atelectasis.   Bibasilar groundglass opacities and interlobular septal thickening in   keeping with pulmonary edema.    LIVER: Cirrhotic morphology.  BILE DUCTS: Normal caliber.  GALLBLADDER: Cholelithiasis.  SPLEEN: Within normal limits.  PANCREAS: Within normal limits.  ADRENALS: Within normal limits.  KIDNEYS/URETERS: Bilateral renal cysts. No hydronephrosis. Bilateral   renal cortical scarring.    BLADDER: Within normal limits.  REPRODUCTIVE ORGANS: Prostate within normal limits.    BOWEL: No bowel obstruction. Appendix is normal.  PERITONEUM: Moderate volume ascites. Right lower quadrant fluid   collection (3, 91) stable since 2012.  VESSELS: Atherosclerotic changes.  RETROPERITONEUM/LYMPH NODES: No retroperitoneal hematoma. Multiple short   segment retrosternal lymph nodes for reference a left para-aortic lymph   node measuring 1.1 x 1.0 cm.    ABDOMINAL WALL: Within normal limits.  BONES: Degenerative changes.    IMPRESSION:     No retroperitoneal hematoma, as clinically questioned.    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    Marked cardiomegaly with congestive heart failure.    Interval worsening of moderate volume ascites.    Moderate right pleural effusion with compressive atelectasis.      < end of copied text >

## 2019-11-04 NOTE — PROVIDER CONTACT NOTE (OTHER) - ASSESSMENT
pt A&ox4, vs as noted, no cp, dizziness, nausea, or palpitations, pt states he is tired today, team 4 at bedside and aware

## 2019-11-04 NOTE — CONSULT NOTE ADULT - ASSESSMENT
86 year old man with medical history of chronic systolic heart failure with PPM and recent upgrade to BiV, valvular heart disease (s/p mechanical aortic valve replacement in 1999 and MV repair in 2006) on coumadin INR goal 2.5 to 3, CAD s/p CABG/Stent BMS x 2 to LAD chronic atrial fibrillation, gout, HLD, HTN, BPH, presenting with dyspnea on exertion and dark stool over the last few days.  + NSAIDs use  Hgb dropped from 12.4 on 09/14 to 9.6 on admission, dropped to 7.8 today 11/04.  Elevated BUN   Supratherapeutic INR : 7.5 on 10/29 to 3.3 today 11/04 ( Coumadin is on hold)  No known history of cirrhosis from before.    Impression:  # Melena - Upper GI bleed DDx:, gastric or duodenal ulcers, erosive gastropathy, Small bowel angioectasia, malignancy      # Acute blood loss anemia likely due to above    # Acute CHF exacerbation.  # likely Cardiac _Cirrhosis, giving Cardiomegaly, biatrial enlargement with Dilated IVC and prominent intrahepatic veins. MELD-Na _27        - varices: unknown         - ascites: yes U/S and CT        - SPE: No        - HCC: No  U/S and CT        - coagulopathy: plt 30 INR 3.3  # s/p mechanical aortic valve replacement   # CAD s/p CABG/Stent BMS x 2 to LAD   # A fib    Recommendations- discussed with medicine resident  :  - PPI infusion.  - Obtain cardiology clearance. ( Please have PPM interrogation result available in the chart)   - Continue to monitor h&h and transfuse if hgb drop below 7   - Clear liquid diet for now   - Plan for EGD tomorrow if he is medically optimized from cardiology perspective.   - NPO after midnight  - trend CBC, INR, CMP   - obtain HBsAb, HBsAg, HBcAb, HCV Ab, HAV IgG/IgM to r/o viral hepatitides as cause of cirrhosis  - obtain RAN, ASMA, LKM Ab, quant IgM, IgA, IgG, AMA to r/o autoimmune disease  - obtain alpha-1-antityrypsin, iron panel and ferritin, ceruloplasmin  - avoid all hepatotoxic agents  - pt will need hep A and hep B vaccination series if non-immune, ultrasound q6 months for HCC screening 86 year old man with medical history of chronic systolic heart failure with PPM and recent upgrade to BiV, valvular heart disease (s/p mechanical aortic valve replacement in 1999 and MV repair in 2006) on coumadin INR goal 2.5 to 3, CAD s/p CABG/Stent BMS x 2 to LAD chronic atrial fibrillation, gout, HLD, HTN, BPH, presenting with dyspnea on exertion and dark stool over the last few days.  + NSAIDs use  Hgb dropped from 12.4 on 09/14 to 9.6 on admission, dropped to 7.8 today 11/04.  Elevated BUN   Supratherapeutic INR : 7.5 on 10/29 to 3.3 today 11/04 ( Coumadin is on hold)  No known history of cirrhosis from before.    Impression:  # Melena - Upper GI bleed DDx:, gastric or duodenal ulcers, erosive gastropathy, GAVE, Variceal bleed, Small bowel angioectasia    # Acute blood loss anemia likely due to above    # Acute CHF exacerbation.  # Supratherapeutic INR : 7.5 on 10/29 to 3.3 today 11/04 ( Coumadin is on hold)  # likely Cardiac _Cirrhosis, giving Cardiomegaly, biatrial enlargement with Dilated IVC and prominent intrahepatic veins. MELD-Na _27        - varices: unknown         - ascites: yes U/S and CT        - SPE: No        - HCC: No  U/S and CT        - coagulopathy: plt 30 INR 3.3  # s/p mechanical aortic valve replacement   # CAD s/p CABG/Stent BMS x 2 to LAD   # A fib    Recommendations- discussed with medicine resident  :  - PPI infusion.  - Obtain cardiology clearance. ( Please have PPM interrogation result available in the chart)   - Continue to monitor h&h and transfuse if hgb drop below 7   - Clear liquid diet for now   - Plan for EGD tomorrow if he is medically optimized from cardiology perspective.   - NPO after midnight  - trend CBC, INR, CMP   - Diagnostic Paracentesis to rule out SBP (check Cultures, Cell count, albumin, protein, ) with albumin replacement   - 2g Sodium diet  - Daily weights  - I+ O  - check daily electrolytes  - obtain HBsAb, HBsAg, HBcAb, HCV Ab, HAV IgG/IgM to r/o viral hepatitides as cause of cirrhosis  - obtain RAN, ASMA, LKM Ab, quant IgM, IgA, IgG, AMA to r/o autoimmune disease  - obtain alpha-1-antityrypsin, iron panel and ferritin, ceruloplasmin  - avoid all hepatotoxic agents  - pt will need hep A and hep B vaccination series if non-immune, ultrasound q6 months for HCC screening 86 year old man with medical history of chronic systolic heart failure with PPM and recent upgrade to BiV, valvular heart disease (s/p mechanical aortic valve replacement in 1999 and MV repair in 2006) on coumadin INR goal 2.5 to 3, CAD s/p CABG/Stent BMS x 2 to LAD chronic atrial fibrillation, gout, HLD, HTN, BPH, presenting with dyspnea on exertion and dark stool over the last few days.  + NSAIDs use  Hgb dropped from 12.4 on 09/14 to 9.6 on admission, dropped to 7.8 today 11/04.  Elevated BUN   Supratherapeutic INR : 7.5 on 10/29 to 3.3 today 11/04 ( Coumadin is on hold)  No known history of cirrhosis from before.    Impression:  # Melena - Upper GI bleed DDx:, gastric or duodenal ulcers, erosive gastropathy, GAVE, Variceal bleed, Small bowel angioectasia    # Acute blood loss anemia likely due to above    # Acute CHF exacerbation.  # Supratherapeutic INR : 7.5 on 10/29 to 3.3 today 11/04 ( Coumadin is on hold)  # likely Cardiac _Cirrhosis, giving Cardiomegaly, biatrial enlargement with Dilated IVC and prominent intrahepatic veins. MELD-Na _27        - varices: unknown         - ascites: yes U/S and CT        - SPE: No        - HCC: No  U/S and CT        - coagulopathy: plt 30 INR 3.3  # s/p mechanical aortic valve replacement   # CAD s/p CABG/Stent BMS x 2 to LAD   # A fib    Recommendations- discussed with medicine resident  :  - PPI infusion.  - Start IV Ceftriaxone 1 g daily    - Obtain cardiology clearance. ( Please have PPM interrogation result available in the chart)   - Continue to monitor h&h and transfuse if hgb drop below 7   - Clear liquid diet for now   - Plan for EGD tomorrow if he is medically optimized from cardiology perspective.   - NPO after midnight  - trend CBC, INR, CMP   - Diagnostic Paracentesis to rule out SBP (check Cultures, Cell count, albumin, protein, ) with albumin replacement   - 2g Sodium diet  - Daily weights  - I+ O  - check daily electrolytes  - obtain HBsAb, HBsAg, HBcAb, HCV Ab, HAV IgG/IgM to r/o viral hepatitides as cause of cirrhosis  - obtain RAN, ASMA, LKM Ab, quant IgM, IgA, IgG, AMA to r/o autoimmune disease  - obtain alpha-1-antityrypsin, iron panel and ferritin, ceruloplasmin  - avoid all hepatotoxic agents  - pt will need hep A and hep B vaccination series if non-immune, ultrasound q6 months for HCC screening 86 year old man with medical history of chronic systolic heart failure with PPM and recent upgrade to BiV, valvular heart disease (s/p mechanical aortic valve replacement in 1999 and MV repair in 2006) on coumadin INR goal 2.5 to 3, CAD s/p CABG/Stent BMS x 2 to LAD chronic atrial fibrillation, gout, HLD, HTN, BPH, presenting with dyspnea on exertion and dark stool over the last few days.  + NSAIDs use  Hgb dropped from 12.4 on 09/14 to 9.6 on admission, dropped to 7.8 today 11/04.  Elevated BUN   Supratherapeutic INR : 7.5 on 10/29 to 3.3 today 11/04 ( Coumadin is on hold)  No known history of cirrhosis from before.    Impression:  # Melena - Upper GI bleed DDx:, gastric or duodenal ulcers, erosive gastropathy, GAVE, Variceal bleed, Small bowel angioectasia    # Acute blood loss anemia likely due to above    # Acute CHF exacerbation.  # Supratherapeutic INR : 7.5 on 10/29 to 3.3 today 11/04 ( Coumadin is on hold)  # likely Cardiac _Cirrhosis, giving Cardiomegaly, biatrial enlargement with Dilated IVC and prominent intrahepatic veins. MELD-Na _27        - varices: unknown         - ascites: yes U/S and CT        - SPE: No        - HCC: No  U/S and CT        - coagulopathy: plt 30 INR 3.3  # s/p mechanical aortic valve replacement   # CAD s/p CABG/Stent BMS x 2 to LAD   # A fib    Recommendations-  addendum:  - Continue with medical management blood and platelets transfusion for active bleeding.  - Obtain Hematology consult.  - High risk to perform EGD or paracentesis for now. (Risks outweighs benefits discussed with cardiology, patient and his family.)   - INR daily. Anticoagulation as per cardiology.  - IV PPI 40 mg bid  - Start IV Ceftriaxone 1 g daily    - Continue to monitor h&h and transfuse if hgb drop below 7   - trend CBC, INR, CMP   - 2g Sodium diet  - Daily weights  - I+ O  - check daily electrolytes  - obtain HBsAb, HBsAg, HBcAb, HCV Ab, HAV IgG/IgM to r/o viral hepatitides as cause of cirrhosis  - obtain RAN, ASMA, LKM Ab, quant IgM, IgA, IgG, AMA to r/o autoimmune disease  - obtain alpha-1-antityrypsin, iron panel and ferritin, ceruloplasmin  - avoid all hepatotoxic agents  - pt will need hep A and hep B vaccination series if non-immune, ultrasound q6 months for HCC screening

## 2019-11-04 NOTE — CONSULT NOTE ADULT - PROBLEM SELECTOR RECOMMENDATION 9
- Will f/u official TTE read. Patient remains volume overloaded, with elevated JVP.  - Recommend increasing lasix to 40 IV bid.  - Check BMP, Mg, Phos bid.  - D/c entresto.  - Daily standing weights and strict I&Os.  - Recommend PPM interrogation. - TTE on prelim read shows EF 40-50%. Bilateral atria appear enlarged. This appears to be more of constrictive/restrictive picture. Patient remains volume overloaded, with elevated JVP.  - Recommend increasing lasix to 40 IV bid.  - Check BMP, Mg, Phos bid.  - D/c entresto.  - Daily standing weights and strict I&Os.  - Recommend PPM interrogation.

## 2019-11-04 NOTE — CONSULT NOTE ADULT - ASSESSMENT
85 yo man PMH of chronic systolic heart failure with PPM and recent upgrade to BiV, valvular heart disease (s/p mechanical aortic valve replacement in 1999 and MV repair in 2006) on coumadin, CAD s/p CABG/Stent BMS x 2 to LAD, chronic atrial fibrillation, HLD, HTN, BPH, Gout presenting with dyspnea on exertion. 87 yo man PMH of chronic systolic heart failure with PPM and recent upgrade to BiV, valvular heart disease (s/p mechanical aortic valve replacement in 1999 and MV repair in 2006) on coumadin, CAD s/p CABG/Stent BMS x 2 to LAD, chronic atrial fibrillation, HLD, HTN, BPH, Gout presenting with dyspnea on exertion, admitted for decompensated CHF.

## 2019-11-04 NOTE — PROCEDURE NOTE - ADDITIONAL PROCEDURE DETAILS
Interrogation Indication: CHF Exacerbation   1. Presenting Rhythm: BIV Paced 70 bpm  2. Measured data WNL, Normal BIV PPM function   3. Battery longevity is 11.2 years remaining  4. Patient is not pacemaker dependent.  5. Review of stored data revealed: One episode of NSVT on 11/1/19 lasting 8 beats  6. AT/AF [place] not available secondary to BIV device without atrial lead  7. Optivol fluid index WNL  8. Changes made: Lower pacing rate increased from 70 to 80 bpm as requested by MD. [place]    [id number]

## 2019-11-04 NOTE — PROGRESS NOTE ADULT - PROBLEM SELECTOR PLAN 1
Patient presenting with dyspnea on exertion with dry cough and abdominal bloating and found to have b/l pleural effusions likely 2/2 CHF exacerbation. Unclear precipitating factor however suspect dietary changes (i.e high salt). Has been taking medications as prescribed, no recent illness. On admission, required bipap 2/2 dyspnea however now on 3L of NC with SpO2 98%. S/p 80mg IVP Lasix in the ED and 40 BID on floor. Found to be hyponatremic to 127 on admission, repeat Na 132 s/p Lasix. Suspect hypervolemic hyponatremia. Bedside POCUS significant for B lines and large right sided pleural effusion. Chest x-ray showing loculated right pleural effusion. CT shows right pleural effusion. Patient hypotensive this morning.  - decrease Lasix 40mg IVP BID to Lasix 40mg QD  - Monitor strict I&Os, goal net negative 2-3L daily  - Daily weights  - schedule TTE

## 2019-11-05 DIAGNOSIS — Z98.890 OTHER SPECIFIED POSTPROCEDURAL STATES: ICD-10-CM

## 2019-11-05 DIAGNOSIS — Z95.0 PRESENCE OF CARDIAC PACEMAKER: ICD-10-CM

## 2019-11-05 DIAGNOSIS — Z95.2 PRESENCE OF PROSTHETIC HEART VALVE: ICD-10-CM

## 2019-11-05 LAB
A1AT SERPL-MCNC: 166 MG/DL — SIGNIFICANT CHANGE UP (ref 90–200)
ALBUMIN SERPL ELPH-MCNC: 2.3 G/DL — LOW (ref 3.3–5)
ALP SERPL-CCNC: 499 U/L — HIGH (ref 40–120)
ALT FLD-CCNC: 44 U/L — SIGNIFICANT CHANGE UP (ref 10–45)
ANION GAP SERPL CALC-SCNC: 9 MMOL/L — SIGNIFICANT CHANGE UP (ref 5–17)
APTT BLD: 51.4 SEC — HIGH (ref 27.5–36.3)
AST SERPL-CCNC: 135 U/L — HIGH (ref 10–40)
BASOPHILS # BLD AUTO: 0 K/UL — SIGNIFICANT CHANGE UP (ref 0–0.2)
BASOPHILS NFR BLD AUTO: 0 % — SIGNIFICANT CHANGE UP (ref 0–2)
BILIRUB SERPL-MCNC: 2.5 MG/DL — HIGH (ref 0.2–1.2)
BUN SERPL-MCNC: 55 MG/DL — HIGH (ref 7–23)
CALCIUM SERPL-MCNC: 9.9 MG/DL — SIGNIFICANT CHANGE UP (ref 8.4–10.5)
CERULOPLASMIN SERPL-MCNC: 37 MG/DL — HIGH (ref 15–30)
CHLORIDE SERPL-SCNC: 94 MMOL/L — LOW (ref 96–108)
CO2 SERPL-SCNC: 29 MMOL/L — SIGNIFICANT CHANGE UP (ref 22–31)
CREAT SERPL-MCNC: 1.01 MG/DL — SIGNIFICANT CHANGE UP (ref 0.5–1.3)
EOSINOPHIL # BLD AUTO: 0.02 K/UL — SIGNIFICANT CHANGE UP (ref 0–0.5)
EOSINOPHIL NFR BLD AUTO: 0.5 % — SIGNIFICANT CHANGE UP (ref 0–6)
FERRITIN SERPL-MCNC: 945 NG/ML — HIGH (ref 30–400)
GLUCOSE SERPL-MCNC: 86 MG/DL — SIGNIFICANT CHANGE UP (ref 70–99)
HAV IGM SER-ACNC: SIGNIFICANT CHANGE UP
HBV CORE IGM SER-ACNC: SIGNIFICANT CHANGE UP
HBV SURFACE AG SER-ACNC: SIGNIFICANT CHANGE UP
HCT VFR BLD CALC: 21.4 % — LOW (ref 39–50)
HCT VFR BLD CALC: 22.9 % — LOW (ref 39–50)
HCT VFR BLD CALC: 24.2 % — LOW (ref 39–50)
HCV AB S/CO SERPL IA: 0.22 S/CO — SIGNIFICANT CHANGE UP (ref 0–0.99)
HCV AB SERPL-IMP: SIGNIFICANT CHANGE UP
HGB BLD-MCNC: 7.2 G/DL — LOW (ref 13–17)
HGB BLD-MCNC: 7.4 G/DL — LOW (ref 13–17)
HGB BLD-MCNC: 7.8 G/DL — LOW (ref 13–17)
IMM GRANULOCYTES NFR BLD AUTO: 1.2 % — SIGNIFICANT CHANGE UP (ref 0–1.5)
INR BLD: 3.22 RATIO — HIGH (ref 0.88–1.16)
IRON SATN MFR SERPL: 110 UG/DL — SIGNIFICANT CHANGE UP (ref 45–165)
IRON SATN MFR SERPL: 58 % — HIGH (ref 16–55)
LYMPHOCYTES # BLD AUTO: 0.19 K/UL — LOW (ref 1–3.3)
LYMPHOCYTES # BLD AUTO: 4.6 % — LOW (ref 13–44)
MAGNESIUM SERPL-MCNC: 1.9 MG/DL — SIGNIFICANT CHANGE UP (ref 1.6–2.6)
MCHC RBC-ENTMCNC: 28.9 PG — SIGNIFICANT CHANGE UP (ref 27–34)
MCHC RBC-ENTMCNC: 29.1 PG — SIGNIFICANT CHANGE UP (ref 27–34)
MCHC RBC-ENTMCNC: 29.8 PG — SIGNIFICANT CHANGE UP (ref 27–34)
MCHC RBC-ENTMCNC: 32.2 GM/DL — SIGNIFICANT CHANGE UP (ref 32–36)
MCHC RBC-ENTMCNC: 32.3 GM/DL — SIGNIFICANT CHANGE UP (ref 32–36)
MCHC RBC-ENTMCNC: 33.6 GM/DL — SIGNIFICANT CHANGE UP (ref 32–36)
MCV RBC AUTO: 88.4 FL — SIGNIFICANT CHANGE UP (ref 80–100)
MCV RBC AUTO: 89.6 FL — SIGNIFICANT CHANGE UP (ref 80–100)
MCV RBC AUTO: 90.2 FL — SIGNIFICANT CHANGE UP (ref 80–100)
MONOCYTES # BLD AUTO: 0.73 K/UL — SIGNIFICANT CHANGE UP (ref 0–0.9)
MONOCYTES NFR BLD AUTO: 17.5 % — HIGH (ref 2–14)
NEUTROPHILS # BLD AUTO: 3.17 K/UL — SIGNIFICANT CHANGE UP (ref 1.8–7.4)
NEUTROPHILS NFR BLD AUTO: 76.2 % — SIGNIFICANT CHANGE UP (ref 43–77)
NRBC # BLD: 1 /100 WBCS — HIGH (ref 0–0)
NRBC # BLD: 2 /100 WBCS — HIGH (ref 0–0)
NRBC # BLD: 2 /100 WBCS — HIGH (ref 0–0)
PHOSPHATE SERPL-MCNC: 3.2 MG/DL — SIGNIFICANT CHANGE UP (ref 2.5–4.5)
PLATELET # BLD AUTO: 26 K/UL — LOW (ref 150–400)
PLATELET # BLD AUTO: 28 K/UL — LOW (ref 150–400)
PLATELET # BLD AUTO: 29 K/UL — LOW (ref 150–400)
POTASSIUM SERPL-MCNC: 4.6 MMOL/L — SIGNIFICANT CHANGE UP (ref 3.5–5.3)
POTASSIUM SERPL-SCNC: 4.6 MMOL/L — SIGNIFICANT CHANGE UP (ref 3.5–5.3)
PROT SERPL-MCNC: 4.6 G/DL — LOW (ref 6–8.3)
PROTHROM AB SERPL-ACNC: 38.4 SEC — HIGH (ref 10–13.1)
RBC # BLD: 2.42 M/UL — LOW (ref 4.2–5.8)
RBC # BLD: 2.54 M/UL — LOW (ref 4.2–5.8)
RBC # BLD: 2.7 M/UL — LOW (ref 4.2–5.8)
RBC # FLD: 19.4 % — HIGH (ref 10.3–14.5)
RBC # FLD: 19.8 % — HIGH (ref 10.3–14.5)
RBC # FLD: 19.9 % — HIGH (ref 10.3–14.5)
SODIUM SERPL-SCNC: 132 MMOL/L — LOW (ref 135–145)
TIBC SERPL-MCNC: 191 UG/DL — LOW (ref 220–430)
UIBC SERPL-MCNC: 81 UG/DL — LOW (ref 110–370)
WBC # BLD: 3.33 K/UL — LOW (ref 3.8–10.5)
WBC # BLD: 3.47 K/UL — LOW (ref 3.8–10.5)
WBC # BLD: 4.16 K/UL — SIGNIFICANT CHANGE UP (ref 3.8–10.5)
WBC # FLD AUTO: 3.33 K/UL — LOW (ref 3.8–10.5)
WBC # FLD AUTO: 3.47 K/UL — LOW (ref 3.8–10.5)
WBC # FLD AUTO: 4.16 K/UL — SIGNIFICANT CHANGE UP (ref 3.8–10.5)

## 2019-11-05 PROCEDURE — 99233 SBSQ HOSP IP/OBS HIGH 50: CPT | Mod: GC

## 2019-11-05 PROCEDURE — 99223 1ST HOSP IP/OBS HIGH 75: CPT

## 2019-11-05 PROCEDURE — 99223 1ST HOSP IP/OBS HIGH 75: CPT | Mod: GC

## 2019-11-05 RX ORDER — SALIVA SUBSTITUTE COMB NO.11 351 MG
5 POWDER IN PACKET (EA) MUCOUS MEMBRANE THREE TIMES A DAY
Refills: 0 | Status: DISCONTINUED | OUTPATIENT
Start: 2019-11-05 | End: 2019-11-09

## 2019-11-05 RX ADMIN — Medication 5000 UNIT(S): at 13:10

## 2019-11-05 RX ADMIN — FINASTERIDE 5 MILLIGRAM(S): 5 TABLET, FILM COATED ORAL at 13:10

## 2019-11-05 RX ADMIN — ATORVASTATIN CALCIUM 10 MILLIGRAM(S): 80 TABLET, FILM COATED ORAL at 22:20

## 2019-11-05 RX ADMIN — Medication 40 MILLIGRAM(S): at 17:20

## 2019-11-05 RX ADMIN — Medication 650 MILLIGRAM(S): at 14:00

## 2019-11-05 RX ADMIN — Medication 0.5 MILLIGRAM(S): at 13:10

## 2019-11-05 RX ADMIN — PANTOPRAZOLE SODIUM 40 MILLIGRAM(S): 20 TABLET, DELAYED RELEASE ORAL at 06:24

## 2019-11-05 RX ADMIN — PANTOPRAZOLE SODIUM 40 MILLIGRAM(S): 20 TABLET, DELAYED RELEASE ORAL at 17:20

## 2019-11-05 RX ADMIN — Medication 40 MILLIGRAM(S): at 06:50

## 2019-11-05 RX ADMIN — Medication 650 MILLIGRAM(S): at 13:12

## 2019-11-05 RX ADMIN — Medication 100 MILLIGRAM(S): at 13:10

## 2019-11-05 NOTE — PROGRESS NOTE ADULT - SUBJECTIVE AND OBJECTIVE BOX
The patient is a 6 year gentleman who has been followed by office for 25 years. In 1999, he underwent placement of a [employer] aortic valve prosthesis and had coronary artery bypass at the same time. on September 16 16, 2006, he underwent mitral valve repair at [facility] for severe mitral insufficiency. In June, 2012, he underwent placement of a [employer] VVIR pacemaker for conduction disease. He has a history of chronic atrial fibrillation which is well controlled. He also has a history of chronic congestive heart failure which has been very well compensated over the years. His last outpatient echocardiogram in 2018 demonstrated an ejection fraction of 33% with abnormal septal motion consistent with prior cardiac surgery and moderate to severe hypokinesis of the other walls. He also had severe tricuspid insufficiency and moderate to severe mitral insufficiency. On the regimen he had been on, the patient was doing better well until approximately 2 months ago when he began feeling more short of breath and weak. Laboratory data demonstrated an increase in his alkaline phosphatase from normal (several months ago) to 500.  A CAT scan demonstrated cirrhosis of the liver presumably from chronic passive congestion Because of ongoing weakness, the patient was admitted.on admission, he was reported to have melena and his hemoglobin dropped from 10 to approximately 7. at this time, the patient is pancytopenic with a platelet count of 20,000 (he has chronic thrombocytopenia usually in the 80 range). Because of the drop in hematocrit, endoscopy was recommended.    An echocardiogram (Which I personally reviewed) performed in the hospital demonstrates an ejection fraction of 50-60%. I reviewed the patient's previous echocardiogram which (as indicated above) did demonstrate significant wall motion abnormalities and systolic dysfunction. In so far as the patient has been well diuresed over the past few years, I am hard pressed to explain his recent deterioration and cirrhosis (except maybe from his tricuspid insufficiency).His iron studies suggest hemochromatosis and his pancytopenia deserves a further workup.               Vital Signs Last 24 Hrs  T(C): 36.4 (05 Nov 2019 04:19), Max: 36.7 (04 Nov 2019 13:20)  T(F): 97.6 (05 Nov 2019 04:19), Max: 98.1 (04 Nov 2019 13:20)  HR: 81 (05 Nov 2019 04:19) (69 - 81)  BP: 92/52 (05 Nov 2019 04:19) (85/55 - 98/60)  BP(mean): --  RR: 18 (05 Nov 2019 04:19) (18 - 18)  SpO2: 98% (05 Nov 2019 04:19) (98% - 100%)  Daily     Daily   I&O's Summary    04 Nov 2019 07:01  -  05 Nov 2019 07:00  --------------------------------------------------------  IN: 0 mL / OUT: 625 mL / NET: -625 mL        Neck: no bruits, JVD, adenopathy  Chest: clear  Cor: [id number], RR, normal S1S2 with no mrght  Abd: soft, nontender, BS+ and no HSM  Ext: w/o CCE  Pulses:2+->dp bilaterally    MEDICATIONS  (STANDING):  allopurinol 100 milliGRAM(s) Oral daily  atorvastatin 10 milliGRAM(s) Oral at bedtime  carvedilol 6.25 milliGRAM(s) Oral every 12 hours  cefTRIAXone   IVPB 1000 milliGRAM(s) IV Intermittent every 24 hours  cefTRIAXone   IVPB      cholecalciferol 5000 Unit(s) Oral daily  cyanocobalamin 100 MICROGram(s) 100 MICROGram(s) Oral daily  finasteride 5 milliGRAM(s) Oral daily  folic acid 0.5 milliGRAM(s) Oral daily  furosemide   Injectable 40 milliGRAM(s) IV Push every 12 hours  pantoprazole  Injectable 40 milliGRAM(s) IV Push two times a day    MEDICATIONS  (PRN):  acetaminophen   Tablet .. 650 milliGRAM(s) Oral every 6 hours PRN Mild Pain (1 - 3), Moderate Pain (4 - 6)      11-04    131<L>  |  94<L>  |  47<H>  ----------------------------<  85  4.0   |  28  |  0.93    Ca    9.5      04 Nov 2019 06:30  Phos  3.6     11-04  Mg     1.8     11-04    TPro  4.6<L>  /  Alb  2.3<L>  /  TBili  2.5<H>  /  DBili  x   /  AST  122<H>  /  ALT  37  /  AlkPhos  501<H>  11-04                          7.2    3.47  )-----------( 26       ( 05 Nov 2019 02:53 )             21.4     PT/INR - ( 04 Nov 2019 08:42 )   PT: 39.2 sec;   INR: 3.34 ratio         PTT - ( 04 Nov 2019 08:42 )  PTT:54.8 sec    HEALTH ISSUES - PROBLEM Dx:  Decompensated heart failure: Continue IV Lasix for now  Atrial fibrillation: heparin for now  CAD (coronary artery disease): No evidence of active ischemic disease  Fecal occult blood test positive: In view of iron studies that suggest hemochromatosis and new onset pancytopenia,would consult with hematology soon as possible. Also in view of the patient's weakness, would consider transfusion. If there is ongoing bleeding, it appears to be slowing based on the very slow drop in the patient's hematocrit over the past few days. In the setting of a platelet count of 20,000, I would not proceed with any invasive procedures including endoscopy at this time If any invasive procedures are felt to be urgently necessary, the patient would need a platelet transfusion first.  Acute on chronic combined systolic and diastolic congestive heart failure: Ejection fraction may be unreliable in this case in view of mitral valve disease and there may be an argument for a trial of a positive inotrope (insofar as the patient has continued to deteriorate despite diuretics as an outpatient).

## 2019-11-05 NOTE — PROGRESS NOTE ADULT - SUBJECTIVE AND OBJECTIVE BOX
04 Nov 2019 07:01  -  05 Nov 2019 07:00  --------------------------------------------------------  IN:  Total IN: 0 mL    OUT:    Voided: 625 mL  Total OUT: 625 mL    Total NET: -625 mL          Physical Exam: Subjective:    Medications:  acetaminophen   Tablet .. 650 milliGRAM(s) Oral every 6 hours PRN  allopurinol 100 milliGRAM(s) Oral daily  atorvastatin 10 milliGRAM(s) Oral at bedtime  Biotene Dry Mouth Oral Rinse 5 milliLiter(s) Swish and Spit three times a day PRN  carvedilol 6.25 milliGRAM(s) Oral every 12 hours  cefTRIAXone   IVPB 1000 milliGRAM(s) IV Intermittent every 24 hours  cefTRIAXone   IVPB      cholecalciferol 5000 Unit(s) Oral daily  cyanocobalamin 100 MICROGram(s) 100 MICROGram(s) Oral daily  finasteride 5 milliGRAM(s) Oral daily  folic acid 0.5 milliGRAM(s) Oral daily  furosemide   Injectable 40 milliGRAM(s) IV Push every 12 hours  pantoprazole  Injectable 40 milliGRAM(s) IV Push two times a day      Physical Exam:    Vitals:  T(C): 36.4 (11-05-19 @ 04:19), Max: 36.7 (11-04-19 @ 13:20)  HR: 81 (11-05-19 @ 04:19) (69 - 81)  BP: 92/52 (11-05-19 @ 04:19) (92/52 - 98/60)  BP(mean): --  ABP: --  ABP(mean): --  RR: 18 (11-05-19 @ 04:19) (18 - 18)  SpO2: 98% (11-05-19 @ 04:19) (98% - 100%)  Wt(kg): --  CVP(cm H2O): --  CO: --  CI: --  PA: --  PA(mean): --  PCWP: --  SVR: --  PVR: --  Vital Signs Last 24 Hrs  T(C): 36.4 (05 Nov 2019 04:19), Max: 36.7 (04 Nov 2019 13:20)  T(F): 97.6 (05 Nov 2019 04:19), Max: 98.1 (04 Nov 2019 13:20)  HR: 81 (05 Nov 2019 04:19) (69 - 81)  BP: 92/52 (05 Nov 2019 04:19) (92/52 - 98/60)  BP(mean): --  RR: 18 (05 Nov 2019 04:19) (18 - 18)  SpO2: 98% (05 Nov 2019 04:19) (98% - 100%)    Daily     Daily     I&O's Summary    04 Nov 2019 07:01  -  05 Nov 2019 07:00  --------------------------------------------------------  IN: 0 mL / OUT: 625 mL / NET: -625 mL        General: No distress. Comfortable.  HEENT: EOM intact.  Neck: Neck supple. JVP not elevated. No masses  Chest: Clear to auscultation bilaterally  CV: Normal S1 and S2. No murmurs, rub, or gallops. Radial pulses normal.  Abdomen: Soft, non-distended, non-tender  Skin: No rashes or skin breakdown  Neurology: Alert and oriented times three. Sensation intact  Psych: Affect normal    Labs:                        7.2    3.47  )-----------( 26       ( 05 Nov 2019 02:53 )             21.4     11-05    132<L>  |  94<L>  |  55<H>  ----------------------------<  86  4.6   |  29  |  1.01    Ca    9.9      05 Nov 2019 09:56  Phos  3.2     11-05  Mg     1.9     11-05    TPro  4.6<L>  /  Alb  2.3<L>  /  TBili  2.5<H>  /  DBili  x   /  AST  135<H>  /  ALT  44  /  AlkPhos  499<H>  11-05    PT/INR - ( 04 Nov 2019 08:42 )   PT: 39.2 sec;   INR: 3.34 ratio         PTT - ( 04 Nov 2019 08:42 )  PTT:54.8 sec      Serum Pro-Brain Natriuretic Peptide: 2019 pg/mL (11-04 @ 06:30) Subjective: Patient continues to have borderline BP (SBP in 80s-90s). PM dose of lasix was held last night and coreg was held this AM.  Patient appeared a little tired this AM.    Medications:  acetaminophen   Tablet .. 650 milliGRAM(s) Oral every 6 hours PRN  allopurinol 100 milliGRAM(s) Oral daily  atorvastatin 10 milliGRAM(s) Oral at bedtime  Biotene Dry Mouth Oral Rinse 5 milliLiter(s) Swish and Spit three times a day PRN  carvedilol 6.25 milliGRAM(s) Oral every 12 hours  cefTRIAXone   IVPB 1000 milliGRAM(s) IV Intermittent every 24 hours  cefTRIAXone   IVPB      cholecalciferol 5000 Unit(s) Oral daily  cyanocobalamin 100 MICROGram(s) 100 MICROGram(s) Oral daily  finasteride 5 milliGRAM(s) Oral daily  folic acid 0.5 milliGRAM(s) Oral daily  furosemide   Injectable 40 milliGRAM(s) IV Push every 12 hours  pantoprazole  Injectable 40 milliGRAM(s) IV Push two times a day    PHYSICAL EXAM:  GENERAL: tired and frail appearing elderly male  HEAD:  Atraumatic, Normocephalic  EYES: EOMI, conjunctiva and sclera clear  NECK: Supple, JVP is up to mid neck  CHEST/LUNG: Decreased breath sounds b/l, no wheezes, ronchi or rales  HEART: Regular rate and rhythm; No murmurs, rubs, or gallops  ABDOMEN: Soft, Nontender, distended; Bowel sounds present  EXTREMITIES:  2+ Peripheral Pulses, No clubbing, cyanosis  NEUROLOGY: non-focal  SKIN: No rashes or lesions    Vital Signs Last 24 Hrs  T(C): 36.4 (05 Nov 2019 04:19), Max: 36.7 (04 Nov 2019 13:20)  T(F): 97.6 (05 Nov 2019 04:19), Max: 98.1 (04 Nov 2019 13:20)  HR: 81 (05 Nov 2019 04:19) (69 - 81)  BP: 92/52 (05 Nov 2019 04:19) (92/52 - 98/60)  BP(mean): --  RR: 18 (05 Nov 2019 04:19) (18 - 18)  SpO2: 98% (05 Nov 2019 04:19) (98% - 100%)    Daily     I&O's Summary    04 Nov 2019 07:01  -  05 Nov 2019 07:00  --------------------------------------------------------  IN: 0 mL / OUT: 625 mL / NET: -625 mL      Labs:                        7.2    3.47  )-----------( 26       ( 05 Nov 2019 02:53 )             21.4     11-05    132<L>  |  94<L>  |  55<H>  ----------------------------<  86  4.6   |  29  |  1.01    Ca    9.9      05 Nov 2019 09:56  Phos  3.2     11-05  Mg     1.9     11-05    TPro  4.6<L>  /  Alb  2.3<L>  /  TBili  2.5<H>  /  DBili  x   /  AST  135<H>  /  ALT  44  /  AlkPhos  499<H>  11-05    PT/INR - ( 04 Nov 2019 08:42 )   PT: 39.2 sec;   INR: 3.34 ratio         PTT - ( 04 Nov 2019 08:42 )  PTT:54.8 sec      Serum Pro-Brain Natriuretic Peptide: 2019 pg/mL (11-04 @ 06:30)

## 2019-11-05 NOTE — PHYSICAL THERAPY INITIAL EVALUATION ADULT - GENERAL OBSERVATIONS, REHAB EVAL
Rec'd in Rec'd in bed LUE IV for blood transfusion, c/o weakness and fatigue, agreeable to B.s evaluation no functional 2/2 to blood transfusion and low H/H

## 2019-11-05 NOTE — CHART NOTE - NSCHARTNOTEFT_GEN_A_CORE
Patient's post transfusin CBC hgb 7.8. Patient short of breath and coughing after unit, relieved with lasix. Discussed with pt and family at bedside that plan would be to transfuse another unit, however family at bedside at this time persistent on waiting until tomorrow morning to monitor CBC. Discussed risks/benefits, will reassess in the AM.

## 2019-11-05 NOTE — PROGRESS NOTE ADULT - SUBJECTIVE AND OBJECTIVE BOX
JOSUE SOLOMON is a 86year old man who has been here for 2day(s).    ***ONE-LINER***  JUNIOR is a 86 year old man with a history of chronic systolic heart failure with recent upgrade to BiV, valvular heart disease (mechanical aortic, 1999; MV repair, 2006), on coumadin (INR goal 2.5 to 3), CAD with CABG and stents (BMSx2, LAD), chronic Afib, thrombocytopenia, gout, HLD, HTN, BPH. He is presenting here for increased dyspnea on exertion with bilateral pleural effusions, abdominal ascites, and peripheral edema in the context of a history of chronic heart failure, valvular heart disease, and CAD. This is most consistent with an acute exacerbation of his chronic systolic heart failure and is being worked up for cardiac cirrhosis and occult GI bleed.     ***INTERVAL HPI/OVERNIGHT EVENTS***  Overnight, the patient had no acute events. The patient was on 3L O2 all of yesterday during the day and night and feels comfortable with it on. He had one bowel movement which he described as dark brown. The patient noted that he felt tired upon waking, but did not have significant SOB at rest. He feels that his abdominal ascites and pedal edema have both improved.     The patient denies headache, chest pain, abdominal pain, changes in urine habits, palpitations.    T(F): 97.6, Max: 98.1 (13:20)  HR: 81 (69 - 81)  BP: 92/52 (92/52 - 98/60)  RR: 18 (18 - 18)  SpO2: 98% (98% - 100%)    ***PHYSICAL EXAM***  GENERAL: NAD, lying in bed comfortably. Well groomed.  HEAD:  Atraumatic, Normocephalic  EYES: EOMI, PERRLA, conjunctiva and sclera clear  ENT: Moist mucous membranes; patent nares, uvula midline  NECK: Supple, No JVD, no lymphadenopathy  CHEST/LUNG: Clear to auscultation bilaterally; No rales, rhonchi, wheezing, or rubs. Unlabored respirations  HEART: Regular rate and rhythm; No murmurs, rubs, or gallops  ABDOMEN: Bowel sounds present; Soft, Nontender, Nondistended. No hepatomegaly  EXTREMITIES:  2+ Peripheral Pulses, brisk capillary refill. No clubbing, cyanosis, or edema  NERVOUS SYSTEM:  Alert & Oriented X3, speech clear. No deficits. CNII-XII grossly intact.  MSK: FROM all 4 extremities, full and equal strength  SKIN: No rashes or lesions      11-04-19 @ 07:01  -  11-05-19 @ 07:00  --------------------------------------------------------  IN: 0 mL / OUT: 625 mL / NET: -625 mL      ***LABORATORY STUDIES***                        7.2    3.47  )-----------( 26       ( 05 Nov 2019 02:53 )             21.4     11-05    132<L>  |  94<L>  |  55<H>  ----------------------------<  86  4.6   |  29  |  1.01    Ca    9.9      05 Nov 2019 09:56  Phos  3.2     11-05  Mg     1.9     11-05    TPro  4.6<L>  /  Alb  2.3<L>  /  TBili  2.5<H>  /  DBili  x   /  AST  135<H>  /  ALT  44  /  AlkPhos  499<H>  11-05    (11-05)  38.4<H>  --------< 3.22<H>  51.4<H>              ***IMAGING***  < from: Transthoracic Echocardiogram (11.04.19 @ 11:01) >  Dimensions:    Normal Values:  LA:     4.6    2.0 - 4.0 cm  Ao:     3.2    2.0 - 3.8 cm  SEPTUM: 1.0    0.6 - 1.2 cm  PWT:    1.0    0.6 - 1.1 cm  LVIDd:  4.9    3.0 - 5.6 cm  LVIDs:  4.2    1.8 - 4.0 cm  Derived variables:  LVMI: 101 g/m2  RWT: 0.40  Fractional short: 14 %  EF (Modified Guerra Rule): 61 %  Doppler Peak Velocity (m/sec): MV=1.8 AoV=3.3  ------------------------------------------------------------------------  Observations:  Mitral Valve: An annuloplasty ring is seen in the mitral  position.. Mild-moderate mitral regurgitation. Peak mitral  valve gradient equals 13 mm Hg, mean transmitral valve  gradient equals 5 mm Hg, which is probably normal in the  setting of a An annuloplasty ring is seen in the mitral  position.  Aortic Valve/Aorta: Mechanical aortic valve prosthesis.  Peak transaortic valve gradient equals 44 mm Hg, mean  transaortic valve gradient equals 21 mm Hg, which is  probably normal in the presence of a mechanical aortic  valve prosthesis. Mild aortic regurgitation.  Peak left  ventricular outflow tract gradient equals 5 mm Hg, mean  gradient is equal to 3 mm Hg, LVOT velocity time integral  equals 19 cm.  Aortic Root: 3.2 cm.  LVOT diameter: 2 cm.  Left Atrium: Severely dilated left atrium.  LA volume index  = 170 cc/m2.  Left Ventricle: Mild segmental left ventricular systolic  dysfunction. The basal inferior wall and basal inferoseptum  appear hypokinetic.  Septal motion consistent with  conduction defect.  Right Heart: Severe right atrial enlargement. A device wire  is noted in the right heart. Normal right ventricular size  with decreased right ventricular systolic function. Normal  tricuspid valve. Moderate-severe tricuspid regurgitation.  Normal pulmonic valve. Minimal pulmonic regurgitation.  Pericardium/Pleura: Normal pericardium with no pericardial  effusion.  Hemodynamic: Estimated right ventricular systolic pressure  equals 51 mm Hg, assuming right atrial pressure equals 15  mm Hg, consistent with moderate pulmonary hypertension.  ------------------------------------------------------------------------  Conclusions:  1. An annuloplasty ring is seen in the mitral position..  Mild-moderate mitral regurgitation. Peak mitral valve  gradient equals 13 mm Hg, mean transmitral valve gradient  equals 5 mm Hg, which is probably normal in the setting of  a An annuloplasty ring is seen in the mitral position.  2. Mechanical aortic valve prosthesis. Peak transaortic  valve gradient equals 44 mm Hg, mean transaortic valve  gradient equals 21 mm Hg, which is probably normal in the  presence of a mechanical aortic valve prosthesis. Mild  aortic regurgitation.  3. Severely dilated left atrium.  LA volume index = 170  cc/m2.  4. Mild segmental left ventricular systolic dysfunction.  The basal inferior wall and basal inferoseptum appear  hypokinetic.  Septal motion consistent with conduction  defect.  5. Severe right atrial enlargement.  6. A device wire is noted in the right heart. Normal right  ventricular size with decreased right ventricular systolic  function.  7. Estimated right ventricular systolic pressure equals 51  mm Hg, assuming right atrial pressure equals 15 mm Hg,  consistent with moderate pulmonary hypertension.    < end of copied text >      ***MEDICATIONS***  acetaminophen   Tablet .. 650 milliGRAM(s) Oral every 6 hours PRN  allopurinol 100 milliGRAM(s) Oral daily  atorvastatin 10 milliGRAM(s) Oral at bedtime  Biotene Dry Mouth Oral Rinse 5 milliLiter(s) Swish and Spit three times a day PRN  carvedilol 6.25 milliGRAM(s) Oral every 12 hours  cefTRIAXone   IVPB 1000 milliGRAM(s) IV Intermittent every 24 hours  cefTRIAXone   IVPB      cholecalciferol 5000 Unit(s) Oral daily  cyanocobalamin 100 MICROGram(s) 100 MICROGram(s) Oral daily  finasteride 5 milliGRAM(s) Oral daily  folic acid 0.5 milliGRAM(s) Oral daily  furosemide   Injectable 40 milliGRAM(s) IV Push every 12 hours  pantoprazole  Injectable 40 milliGRAM(s) IV Push two times a day    HEALTH ISSUES - PROBLEM Dx:  Decompensated heart failure: Decompensated heart failure  Preventive measure: Preventive measure  BPH (benign prostatic hyperplasia): BPH (benign prostatic hyperplasia)  Dyslipidemia: Dyslipidemia  Atrial fibrillation: Atrial fibrillation  Valvular heart disease: Valvular heart disease  CAD (coronary artery disease): CAD (coronary artery disease)  HTN (hypertension): HTN (hypertension)  Fecal occult blood test positive: Fecal occult blood test positive  Cardiac cirrhosis: Cardiac cirrhosis  Acute on chronic combined systolic and diastolic congestive heart failure: Acute on chronic combined systolic and diastolic congestive heart failure

## 2019-11-05 NOTE — PROGRESS NOTE ADULT - PROBLEM SELECTOR PLAN 7
History of chronic afib, on coumadin and coreg for rate control. Has PPM with BiV device.  - monitor on telemetry  - Hold warfain

## 2019-11-05 NOTE — PROGRESS NOTE ADULT - ASSESSMENT
85 yo man PMH of chronic systolic heart failure with PPM and recent upgrade to BiV, valvular heart disease (s/p mechanical aortic valve replacement in 1999 and MV repair in 2006) on coumadin, CAD s/p CABG/Stent BMS x 2 to LAD, chronic atrial fibrillation, HLD, HTN, BPH, Gout presenting with SOB, admitted for decompensated CHF, with course c/b GI bleed and labs notable for pancytopenia.

## 2019-11-05 NOTE — PHYSICAL THERAPY INITIAL EVALUATION ADULT - ACTIVE RANGE OF MOTION EXAMINATION, REHAB EVAL
bilateral  lower extremity Active ROM was WFL (within functional limits)/except L: sh and elbow NA/bilateral upper extremity Active ROM was WFL (within functional limits)

## 2019-11-05 NOTE — PHYSICAL THERAPY INITIAL EVALUATION ADULT - ADDITIONAL COMMENTS
lives w/ lives w/wife in private home in [place] few steps to enter w/ handrails and 14-16 to bedroom, also has home in [place], and no steps to negotiate there,  glasses for reading, R handed pt has rolling walker, cane / grab bars, shower seat,

## 2019-11-05 NOTE — PROGRESS NOTE ADULT - ATTENDING COMMENTS
Briefly, 85 y/o M h/o reported HFrEF (EF previously 33%, now improvd to 60%), prior CHB s/p PPM with recent upgrade to BiV 9/13/19), prior valvular heart disease (s/p mechanical aortic valve replacement in 1999 and MV repair in 2006) on coumadin, CAD s/p CABG 1999/Stent BMS x 2 to LAD, chronic atrial fibrillation, who presented with weakness/fatigue with short distance. On admission, noted to be volume overloaded with elevated neck veins. Improved with lasix IV. On exam, frail appearing, NAD, JVP approx 12-14 with HJR, irreg irreg rhythm, dec BS b/l, distended abdomen, nontender, 1+ ankle edema, slightly cool to palpation. Labs reviewed - notably pancytopenic (WBC 2, Hb 7, PLT 20s), BUN/Cr 40/0.9, AST/ALT midlly elevated, alk phos 500s, Tbili 2.5, albumin 2.3. Imaging notable for cirrhosis. TTE reviewed - marked biatrial enlargement, nl LV function (approx 50%), mod MR, mod TR, mitral ring, mechanical Ao valve. Overall stage D HF, NYHA class IV with TTE suggestive of a constrictive/restrictive picture and volume overload.  - received lasix IV once/day d/t BP; lasix as above  - will increase BiV device to 80 to augment CO  - d/c Entresto for now given hypotension  - c/w coreg 6.25 mg bid; may switch to toprol if BP limiting  - overall disease process d/w family Briefly, 85 y/o M h/o reported HFrEF (EF previously 33%, now improvd to 60%), prior CHB s/p PPM with recent upgrade to BiV 9/13/19), prior valvular heart disease (s/p mechanical aortic valve replacement in 1999 and MV repair in 2006) on coumadin, CAD s/p CABG 1999/Stent BMS x 2 to LAD, chronic atrial fibrillation, who presented with weakness/fatigue with short distance. On admission, noted to be volume overloaded with elevated neck veins. Improved with lasix IV. On exam, frail appearing, NAD, JVP approx 12-14 with HJR, irreg irreg rhythm, dec BS b/l, distended abdomen, nontender, 1+ ankle edema, slightly cool to palpation. Labs reviewed - notably pancytopenic (WBC 2, Hb 7, PLT 20s), BUN/Cr 40/0.9, AST/ALT midlly elevated, alk phos 500s, Tbili 2.5, albumin 2.3. Imaging notable for cirrhosis. TTE reviewed - marked biatrial enlargement, nl LV function (approx 50%), mod MR, mod TR, mitral ring, mechanical Ao valve. Overall stage D HF, NYHA class IV with TTE suggestive of a constrictive/restrictive picture and volume overload.  - received lasix IV once/day d/t BP; lasix as above  - will increase BiV device to 80 to augment CO  - d/c Entresto for now given hypotension  - d/c coreg 6.25 mg bid for now   - overall disease process d/w family

## 2019-11-05 NOTE — PROGRESS NOTE ADULT - PROBLEM SELECTOR PLAN 1
Patient remains volume overloaded, which can explain component of SOB. He's also anemic and pancytopenic, which per primary team, he has been work up for. Anemia and acute GI bleed can partially explain SOB as well.  - Continue lasix IV 40 bid with hold parameter of SBP<85.  - D/c coreg given hypotension.  - Recommend hematology c/s for pancytopenia.  - Can likely defer endoscopy until more stable.  - F/u reticulocyte count and ammonia. US abdomen shows cirrhosis, likely in setting of CHF exacerbation.

## 2019-11-05 NOTE — PROGRESS NOTE ADULT - PROBLEM SELECTOR PLAN 1
Patient presenting with dyspnea on exertion with dry cough and abdominal bloating and found to have b/l pleural effusions likely 2/2 CHF exacerbation. Unclear precipitating factor however suspect dietary changes (i.e high salt). Has been taking medications as prescribed, no recent illness. On admission, required bipap 2/2 dyspnea however now on 3L of NC with SpO2 98%. S/p 80mg IVP Lasix in the ED and 40 BID on floor. Found to be hyponatremic to 127 on admission, repeat Na 132 s/p Lasix. Suspect hypervolemic hyponatremia. Bedside POCUS significant for B lines and large right sided pleural effusion. Chest x-ray showing loculated right pleural effusion. CT shows right pleural effusion. TTE shows EF of 50-60%, moderate mitral regurgitation, and bilateral atrial dilatation. Patient consistently hypotensive with systolics in the 90s. Improved pedal edema  - maintain Lasix at 40mg BID  - follow Heart Failure team recs for inotrope initiation  - Monitor strict I&Os, goal net negative 2-3L daily  - Daily weights

## 2019-11-05 NOTE — PROGRESS NOTE ADULT - ATTENDING COMMENTS
I have seen and examined the patient with Mr. Bernard ( ) and resident Dr Lomas in AM.  Cirrhosis work up in progress   continue IV lasix   heart failure note appreciated   d/w HF team  monitor on tele   Haem consult   d/w patient's daughter

## 2019-11-05 NOTE — PROGRESS NOTE ADULT - ASSESSMENT
ALAN is a 86 year old man with a history of chronic systolic heart failure with recent upgrade to BiV, valvular heart disease (mechanical aortic, 1999; MV repair, 2006), on coumadin (INR goal 2.5 to 3), CAD with CABG and stents (BMSx2, LAD), chronic Afib, thrombocytopenia, gout, HLD, HTN, BPH.    He is presenting here for increased dyspnea on exertion with bilateral pleural effusions, abdominal ascites, and peripheral edema in the context of a history of chronic heart failure, valvular heart disease, and CAD. This is most consistent with an acute exacerbation of his chronic systolic heart failure (class IV, stage D). The patient has CT and laboratory evidence of cirrhosis, likely secondary to his CHF exacerbation, though autoimmune, metabolic, and infectious etiologies are being pursued. Given the steady drop in hemoglobin, the persistent weakness, and the dark stools, this patient is being worked up for a GI bleed; the stool color has lightened somewhat and the hemoglobin levels have stabilized somewhat. The patient is being transfused today for Hb < 8.  The patient is responding well to IV diuresis at this point. Invasive procedures are being held at this point due to a low platelet count and elevated INR (goal is <2).

## 2019-11-06 LAB
ALBUMIN SERPL ELPH-MCNC: 2.2 G/DL — LOW (ref 3.3–5)
ALBUMIN SERPL ELPH-MCNC: 2.4 G/DL — LOW (ref 3.3–5)
ALP SERPL-CCNC: 508 U/L — HIGH (ref 40–120)
ALP SERPL-CCNC: 530 U/L — HIGH (ref 40–120)
ALT FLD-CCNC: 45 U/L — SIGNIFICANT CHANGE UP (ref 10–45)
ALT FLD-CCNC: 52 U/L — HIGH (ref 10–45)
AMMONIA BLD-MCNC: 35 UMOL/L — SIGNIFICANT CHANGE UP (ref 11–55)
ANION GAP SERPL CALC-SCNC: 11 MMOL/L — SIGNIFICANT CHANGE UP (ref 5–17)
ANION GAP SERPL CALC-SCNC: 11 MMOL/L — SIGNIFICANT CHANGE UP (ref 5–17)
ANION GAP SERPL CALC-SCNC: 7 MMOL/L — SIGNIFICANT CHANGE UP (ref 5–17)
APTT BLD: 51.8 SEC — HIGH (ref 27.5–36.3)
AST SERPL-CCNC: 136 U/L — HIGH (ref 10–40)
AST SERPL-CCNC: 185 U/L — HIGH (ref 10–40)
BASOPHILS # BLD AUTO: 0.01 K/UL — SIGNIFICANT CHANGE UP (ref 0–0.2)
BASOPHILS NFR BLD AUTO: 0.3 % — SIGNIFICANT CHANGE UP (ref 0–2)
BILIRUB SERPL-MCNC: 2.7 MG/DL — HIGH (ref 0.2–1.2)
BILIRUB SERPL-MCNC: 3.5 MG/DL — HIGH (ref 0.2–1.2)
BUN SERPL-MCNC: 52 MG/DL — HIGH (ref 7–23)
BUN SERPL-MCNC: 60 MG/DL — HIGH (ref 7–23)
BUN SERPL-MCNC: 60 MG/DL — HIGH (ref 7–23)
CALCIUM SERPL-MCNC: 10.6 MG/DL — HIGH (ref 8.4–10.5)
CALCIUM SERPL-MCNC: 10.7 MG/DL — HIGH (ref 8.4–10.5)
CALCIUM SERPL-MCNC: 10.7 MG/DL — HIGH (ref 8.4–10.5)
CHLORIDE SERPL-SCNC: 94 MMOL/L — LOW (ref 96–108)
CHLORIDE SERPL-SCNC: 94 MMOL/L — LOW (ref 96–108)
CHLORIDE SERPL-SCNC: 95 MMOL/L — LOW (ref 96–108)
CO2 SERPL-SCNC: 26 MMOL/L — SIGNIFICANT CHANGE UP (ref 22–31)
CO2 SERPL-SCNC: 27 MMOL/L — SIGNIFICANT CHANGE UP (ref 22–31)
CO2 SERPL-SCNC: 30 MMOL/L — SIGNIFICANT CHANGE UP (ref 22–31)
CREAT SERPL-MCNC: 1 MG/DL — SIGNIFICANT CHANGE UP (ref 0.5–1.3)
CREAT SERPL-MCNC: 1 MG/DL — SIGNIFICANT CHANGE UP (ref 0.5–1.3)
CREAT SERPL-MCNC: 1.03 MG/DL — SIGNIFICANT CHANGE UP (ref 0.5–1.3)
EOSINOPHIL # BLD AUTO: 0 K/UL — SIGNIFICANT CHANGE UP (ref 0–0.5)
EOSINOPHIL NFR BLD AUTO: 0 % — SIGNIFICANT CHANGE UP (ref 0–6)
GLUCOSE SERPL-MCNC: 82 MG/DL — SIGNIFICANT CHANGE UP (ref 70–99)
GLUCOSE SERPL-MCNC: 91 MG/DL — SIGNIFICANT CHANGE UP (ref 70–99)
GLUCOSE SERPL-MCNC: 91 MG/DL — SIGNIFICANT CHANGE UP (ref 70–99)
HCT VFR BLD CALC: 24.6 % — LOW (ref 39–50)
HCT VFR BLD CALC: 25.8 % — LOW (ref 39–50)
HGB BLD-MCNC: 7.9 G/DL — LOW (ref 13–17)
HGB BLD-MCNC: 8.2 G/DL — LOW (ref 13–17)
IGA FLD-MCNC: 226 MG/DL — SIGNIFICANT CHANGE UP (ref 84–499)
IGG FLD-MCNC: 850 MG/DL — SIGNIFICANT CHANGE UP (ref 610–1660)
IGM SERPL-MCNC: 61 MG/DL — SIGNIFICANT CHANGE UP (ref 35–242)
IMM GRANULOCYTES NFR BLD AUTO: 1.9 % — HIGH (ref 0–1.5)
INR BLD: 3.24 RATIO — HIGH (ref 0.88–1.16)
LKM AB SER-ACNC: <20.1 UNITS — SIGNIFICANT CHANGE UP (ref 0–20)
LYMPHOCYTES # BLD AUTO: 0.22 K/UL — LOW (ref 1–3.3)
LYMPHOCYTES # BLD AUTO: 5.9 % — LOW (ref 13–44)
MAGNESIUM SERPL-MCNC: 1.9 MG/DL — SIGNIFICANT CHANGE UP (ref 1.6–2.6)
MAGNESIUM SERPL-MCNC: 2.1 MG/DL — SIGNIFICANT CHANGE UP (ref 1.6–2.6)
MCHC RBC-ENTMCNC: 28.2 PG — SIGNIFICANT CHANGE UP (ref 27–34)
MCHC RBC-ENTMCNC: 29.4 PG — SIGNIFICANT CHANGE UP (ref 27–34)
MCHC RBC-ENTMCNC: 31.8 GM/DL — LOW (ref 32–36)
MCHC RBC-ENTMCNC: 32.1 GM/DL — SIGNIFICANT CHANGE UP (ref 32–36)
MCV RBC AUTO: 88.7 FL — SIGNIFICANT CHANGE UP (ref 80–100)
MCV RBC AUTO: 91.4 FL — SIGNIFICANT CHANGE UP (ref 80–100)
MONOCYTES # BLD AUTO: 0.61 K/UL — SIGNIFICANT CHANGE UP (ref 0–0.9)
MONOCYTES NFR BLD AUTO: 16.2 % — HIGH (ref 2–14)
NEUTROPHILS # BLD AUTO: 2.85 K/UL — SIGNIFICANT CHANGE UP (ref 1.8–7.4)
NEUTROPHILS NFR BLD AUTO: 75.7 % — SIGNIFICANT CHANGE UP (ref 43–77)
NRBC # BLD: 2 /100 WBCS — HIGH (ref 0–0)
PHOSPHATE SERPL-MCNC: 2.9 MG/DL — SIGNIFICANT CHANGE UP (ref 2.5–4.5)
PHOSPHATE SERPL-MCNC: 3.9 MG/DL — SIGNIFICANT CHANGE UP (ref 2.5–4.5)
PLATELET # BLD AUTO: 27 K/UL — LOW (ref 150–400)
PLATELET # BLD AUTO: 29 K/UL — LOW (ref 150–400)
POTASSIUM SERPL-MCNC: 4.9 MMOL/L — SIGNIFICANT CHANGE UP (ref 3.5–5.3)
POTASSIUM SERPL-MCNC: 5.1 MMOL/L — SIGNIFICANT CHANGE UP (ref 3.5–5.3)
POTASSIUM SERPL-MCNC: 6.1 MMOL/L — HIGH (ref 3.5–5.3)
POTASSIUM SERPL-SCNC: 4.9 MMOL/L — SIGNIFICANT CHANGE UP (ref 3.5–5.3)
POTASSIUM SERPL-SCNC: 5.1 MMOL/L — SIGNIFICANT CHANGE UP (ref 3.5–5.3)
POTASSIUM SERPL-SCNC: 6.1 MMOL/L — HIGH (ref 3.5–5.3)
PROT SERPL-MCNC: 4.7 G/DL — LOW (ref 6–8.3)
PROT SERPL-MCNC: 4.8 G/DL — LOW (ref 6–8.3)
PROTHROM AB SERPL-ACNC: 38.3 SEC — HIGH (ref 10–13.1)
RBC # BLD: 2.69 M/UL — LOW (ref 4.2–5.8)
RBC # BLD: 2.69 M/UL — LOW (ref 4.2–5.8)
RBC # BLD: 2.91 M/UL — LOW (ref 4.2–5.8)
RBC # FLD: 19.8 % — HIGH (ref 10.3–14.5)
RBC # FLD: 21.9 % — HIGH (ref 10.3–14.5)
RETICS #: 72.1 K/UL — SIGNIFICANT CHANGE UP (ref 25–125)
RETICS/RBC NFR: 2.7 % — HIGH (ref 0.5–2.5)
SODIUM SERPL-SCNC: 131 MMOL/L — LOW (ref 135–145)
SODIUM SERPL-SCNC: 132 MMOL/L — LOW (ref 135–145)
SODIUM SERPL-SCNC: 132 MMOL/L — LOW (ref 135–145)
WBC # BLD: 3.63 K/UL — LOW (ref 3.8–10.5)
WBC # BLD: 3.76 K/UL — LOW (ref 3.8–10.5)
WBC # FLD AUTO: 3.63 K/UL — LOW (ref 3.8–10.5)
WBC # FLD AUTO: 3.76 K/UL — LOW (ref 3.8–10.5)

## 2019-11-06 PROCEDURE — 99232 SBSQ HOSP IP/OBS MODERATE 35: CPT | Mod: GC

## 2019-11-06 PROCEDURE — 99233 SBSQ HOSP IP/OBS HIGH 50: CPT

## 2019-11-06 PROCEDURE — 99233 SBSQ HOSP IP/OBS HIGH 50: CPT | Mod: GC

## 2019-11-06 PROCEDURE — 71045 X-RAY EXAM CHEST 1 VIEW: CPT | Mod: 26

## 2019-11-06 RX ORDER — FUROSEMIDE 40 MG
40 TABLET ORAL ONCE
Refills: 0 | Status: COMPLETED | OUTPATIENT
Start: 2019-11-06 | End: 2019-11-06

## 2019-11-06 RX ORDER — FUROSEMIDE 40 MG
20 TABLET ORAL ONCE
Refills: 0 | Status: DISCONTINUED | OUTPATIENT
Start: 2019-11-06 | End: 2019-11-06

## 2019-11-06 RX ORDER — FUROSEMIDE 40 MG
60 TABLET ORAL EVERY 8 HOURS
Refills: 0 | Status: DISCONTINUED | OUTPATIENT
Start: 2019-11-06 | End: 2019-11-06

## 2019-11-06 RX ORDER — CEFTRIAXONE 500 MG/1
1000 INJECTION, POWDER, FOR SOLUTION INTRAMUSCULAR; INTRAVENOUS EVERY 24 HOURS
Refills: 0 | Status: DISCONTINUED | OUTPATIENT
Start: 2019-11-06 | End: 2019-11-09

## 2019-11-06 RX ORDER — METOPROLOL TARTRATE 50 MG
12.5 TABLET ORAL ONCE
Refills: 0 | Status: COMPLETED | OUTPATIENT
Start: 2019-11-06 | End: 2019-11-06

## 2019-11-06 RX ORDER — FUROSEMIDE 40 MG
40 TABLET ORAL EVERY 12 HOURS
Refills: 0 | Status: DISCONTINUED | OUTPATIENT
Start: 2019-11-06 | End: 2019-11-07

## 2019-11-06 RX ADMIN — Medication 40 MILLIGRAM(S): at 18:23

## 2019-11-06 RX ADMIN — Medication 100 MILLIGRAM(S): at 12:06

## 2019-11-06 RX ADMIN — CEFTRIAXONE 100 MILLIGRAM(S): 500 INJECTION, POWDER, FOR SOLUTION INTRAMUSCULAR; INTRAVENOUS at 09:44

## 2019-11-06 RX ADMIN — Medication 40 MILLIGRAM(S): at 14:14

## 2019-11-06 RX ADMIN — FINASTERIDE 5 MILLIGRAM(S): 5 TABLET, FILM COATED ORAL at 12:05

## 2019-11-06 RX ADMIN — Medication 5000 UNIT(S): at 12:05

## 2019-11-06 RX ADMIN — Medication 40 MILLIGRAM(S): at 06:24

## 2019-11-06 RX ADMIN — Medication 12.5 MILLIGRAM(S): at 22:31

## 2019-11-06 RX ADMIN — PANTOPRAZOLE SODIUM 40 MILLIGRAM(S): 20 TABLET, DELAYED RELEASE ORAL at 06:24

## 2019-11-06 RX ADMIN — Medication 0.5 MILLIGRAM(S): at 12:05

## 2019-11-06 RX ADMIN — PANTOPRAZOLE SODIUM 40 MILLIGRAM(S): 20 TABLET, DELAYED RELEASE ORAL at 18:24

## 2019-11-06 NOTE — PROGRESS NOTE ADULT - PROBLEM SELECTOR PLAN 1
Patient presenting with dyspnea on exertion with dry cough and abdominal bloating and found to have b/l pleural effusions likely 2/2 CHF exacerbation. Unclear precipitating factor however suspect dietary changes (i.e high salt). Has been taking medications as prescribed, no recent illness. On admission, required bipap 2/2 dyspnea however now on 4L of NC with SpO2 100%. S/p 80mg IVP Lasix in the ED and 40 BID on floor. Found to be hyponatremic to 127 on admission, repeat Na 132 s/p Lasix. Suspect hypervolemic hyponatremia. Bedside POCUS significant for B lines and large right sided pleural effusion. Chest x-ray showing loculated right pleural effusion. CT shows right pleural effusion. TTE shows EF of 50-60%, moderate mitral regurgitation, and bilateral atrial dilatation. Patient consistently hypotensive with systolics in the 90s. Improved pedal edema  - maintain Lasix at 40mg BID due to poor oral intake of fluids. Consider increasing to 60 TID if oral intake improves.   - give bolus of 40mg IV lasix prior to transfusion to mitigate the SOB and cough experienced in prior transfusion.   - follow Heart Failure team recs for inotrope initiation  - Monitor strict I&Os, goal net negative 2-3L daily  - encourage PO intake  - Daily weights

## 2019-11-06 NOTE — CONSULT NOTE ADULT - SUBJECTIVE AND OBJECTIVE BOX
85 YO M PMH of chronic systolic heart failure with PPM and recent upgrade to BiV, valvular heart disease (s/p mechanical aortic valve replacement in 1999 and MV repair in 2006) on coumadin, CAD s/p CABG/Stent BMS x 2 to LAD, afib, gout, HLD, HTN, BPH, pw dyspnea on exertion. Patient states he started developing SOB a couple of days ago worse with exertion. He says that at baseline he is able to walk 2,000-3,000 steps daily without limitations. Sleeps with 1 pillow at night as well as 1 pillow under his legs. Reports recent dry cough but no chest pain. Also reports abdominal bloating recently. No fevers, chills, recent illness, or travel history. Reports good compliance with his medications. Recently saw his Cardiologist who switched Lasix to Torsemide. He says he has felt weak and tired overall since his PPM was upgraded to a BiV device in September. He denies any history of bleeding disorders or Liver disease but does report having darker than usual stools recently.    Hematology consulted for anemia and thrombocytopenia. Pt noted to be more anemic than before.  Had a colonoscopy done years ago which was normal.  FOBT was positive. Pt is also thrombocytopenic, which appears chronic, dating back to 2012.     Allergies  No Known Allergies    PAST MEDICAL & SURGICAL HISTORY:  HTN (hypertension)  Dyslipidemia  CAD (coronary atherosclerotic disease) s/p CABG/Stent BMS x 2 to LAD  BPH (benign prostatic hyperplasia)  Atrial fibrillation  S/P rotator cuff surgery  s/p hernia repair x 2  S/P coronary artery stent placement x 2 (BMS to LAD)  S/P CABG x 2  S/P mitral valve repair (2006)  s/p mechanical aortic valve replacement (1999)      FAMILY HISTORY:  No pertinent family history in first degree relatives    Social History:  Originally from [place], retired, no alcohol or cigarette use    REVIEW OF SYSTEMS:    CONSTITUTIONAL: + fatigue,  No  fevers or chills  EYES/ENT: No visual changes, no throat pain   RESPIRATORY: SOb with exertion  CARDIOVASCULAR: No chest pain or palpitations  GASTROINTESTINAL: No abdominal pain, nausea, vomiting, or hematemesis; No diarrhea or constipation. No melena or hematochezia.  GENITOURINARY: No dysuria, frequency or hematuria  MUSCULOSKELETAL: No joint pain.  NEUROLOGICAL: No dizziness, numbness, or weakness  SKIN: No itching, burning, rashes, or lesions   All other review of systems is negative unless indicated above.    VITAL SIGNS:  Vital Signs Last 24 Hrs  T(C): 36.7 (06 Nov 2019 03:48), Max: 36.7 (05 Nov 2019 21:56)  T(F): 98 (06 Nov 2019 03:48), Max: 98 (05 Nov 2019 21:56)  HR: 91 (06 Nov 2019 06:21) (70 - 91)  BP: 105/69 (06 Nov 2019 06:21) (97/63 - 110/73)  BP(mean): --  RR: 18 (06 Nov 2019 06:21) (16 - 18)  SpO2: 100% (06 Nov 2019 06:21) (98% - 100%)      PHYSICAL EXAM:     GENERAL: no acute distress  HEENT: EOMI, neck supple  RESPIRATORY: LCTAB/L, no rhonchi, rales, or wheezing  CARDIOVASCULAR: RRR, no murmurs, gallops, rubs  ABDOMINAL: soft, non-tender, non-distended, positive bowel sounds   EXTREMITIES: no clubbing, cyanosis, or edema  NEUROLOGICAL: alert and oriented x 3, non-focal  SKIN: no rashes or lesions   MUSCULOSKELETAL: no gross joint deformity                          7.8    3.33  )-----------( 28       ( 05 Nov 2019 18:05 )             24.2     11-06    132<L>  |  95<L>  |  52<H>  ----------------------------<  82  4.9   |  30  |  1.03    Ca    10.6<H>      06 Nov 2019 06:50  Phos  2.9     11-06  Mg     1.9     11-06    TPro  4.7<L>  /  Alb  2.2<L>  /  TBili  2.7<H>  /  DBili  x   /  AST  136<H>  /  ALT  45  /  AlkPhos  508<H>  11-06      MEDICATIONS  (STANDING):  allopurinol 100 milliGRAM(s) Oral daily  atorvastatin 10 milliGRAM(s) Oral at bedtime  cefTRIAXone   IVPB 1000 milliGRAM(s) IV Intermittent every 24 hours  cholecalciferol 5000 Unit(s) Oral daily  cyanocobalamin 100 MICROGram(s) 100 MICROGram(s) Oral daily  finasteride 5 milliGRAM(s) Oral daily  folic acid 0.5 milliGRAM(s) Oral daily  furosemide   Injectable 40 milliGRAM(s) IV Push every 12 hours  pantoprazole  Injectable 40 milliGRAM(s) IV Push two times a day    < from: CT Abdomen and Pelvis No Cont (11.03.19 @ 16:48) >  IMPRESSION:     No retroperitoneal hematoma, as clinically questioned.    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    Marked cardiomegaly with congestive heart failure.    Interval worsening of moderate volume ascites.      < end of copied text >

## 2019-11-06 NOTE — PROGRESS NOTE ADULT - PROBLEM SELECTOR PLAN 7
History of chronic afib, on coumadin and coreg for rate control. Has PPM with BiV device.  - monitor on telemetry  - Hold warfain History of chronic Afib, on coumadin and coreg for rate control. Has PPM with BiV device.  - monitor on telemetry  - Hold warfain

## 2019-11-06 NOTE — DIETITIAN INITIAL EVALUATION ADULT. - ENERGY NEEDS
Ht: 67 inches Wt: 141 pounds BMI: 22.1 kg/m2 IBW: 148 pounds(+/-10%) 95%IBW  2+ L/R foot edema. no pressure ulcers documented.

## 2019-11-06 NOTE — DIETITIAN INITIAL EVALUATION ADULT. - OTHER INFO
Intake PTA: Pt was resting at time of multiple visits. Information obtained from family. Per pt's daughter, pt has had increasingly poor PO intake for the past two months after his PPM was upgraded to a BiV device in September. Pt lives with his wife. Previously, they ate a lot of take-out foods, but have more recently been very mindful for sodium intake. Pt was taking Ensure + coffee ice cream shakes at home.     Confirms NKFA, no nausea/vomiting, no GI distress, folic acid, Vitamin D3 and B12 supplementation PTA per chart, last BM 11/4.    Swallowing: pt's family has requested pt to receive nectar-thick liquids. They state pt sometimes has episodes where liquid goes "down the wrong pipe," which causes excessive coughing, however pt was able to take his medication with water today.     Diet: Pt was just advanced to dysphagia 3 diet with nectar-thick liquids. While on the clear diet, pt was taking juice and jello. Pt's family was not giving pt Promote due to sodium content.    Education: Reviewed heart failure nutrition recommendations, and encouraged optimal protein intake with nutrient dense foods and nutrition supplements. Family is knowledgeable of low-sodium diet, stating pt's MD recommended 1.4-2g sodium restriction.      Weight Hx: Pt's family unable to provide pt's UBW. No prior sunrise records are noted. Pt's weight was 141 pounds (11/3). Noted pt is fluid overloaded per chart- contributing to current weight.

## 2019-11-06 NOTE — PROGRESS NOTE ADULT - SUBJECTIVE AND OBJECTIVE BOX
JOSUE SOLOMON is a 86year old man who has been here for 3day(s).    ***ONE-LINER***  ALAN is a 86 year old man with a history of chronic systolic heart failure with recent upgrade to BiV, valvular heart disease (mechanical aortic, 1999; MV repair, 2006), on coumadin (INR goal 2.5 to 3), CAD with CABG and stents (BMSx2, LAD), chronic Afib, thrombocytopenia, gout, HLD, HTN, BPH. He is presenting here for increased dyspnea on exertion with bilateral pleural effusions, abdominal ascites, and peripheral edema in the context of a history of chronic heart failure, valvular heart disease, and CAD. This is most consistent with an acute exacerbation of his chronic systolic heart failure and is being worked up for cardiac cirrhosis, pancytopenia, and possible occult GI bleed.     ***INTERVAL HPI/OVERNIGHT EVENTS***  Overnight, the patient had 1U of blood transfused. The patient did not report any fevers, chills, rigors, or tremors but, towards the end of the transfusion, the patient did report sudden onset shortness of breath and cough. This resolved once lasix were given. Post transfusion CBC showed anemia of 7.8 and another transfusion was attempted, however patient and family refused. The night was otherwise uneventful He does report some low back pain he thinks due to the bed. On waking, he reported feeling tired, especially if he exerts himself by changing position in the bed. At some point in the morning his O2 (at 4L) fell off and he desaturated to the 80s. He reports no new stools since the last note. He feels that his ascites and pedal edema have both improved.     The patient denies dizziness, lightheadedness, shortness of breath, chest pain, abdominal pain, and palpitations.    T(F): 97.1, Max: 98 (21:56)  HR: 100 (80 - 100)  BP: 120/63 (80/40 - 120/63)  RR: 18 (16 - 20)  SpO2: 95% (82% - 100%)    ***PHYSICAL EXAM***  GENERAL: NAD, lying in bed comfortably. Well groomed.  HEAD:  Atraumatic, Normocephalic  EYES: EOMI, PERRLA, conjunctiva and sclera clear  ENT: Moist mucous membranes; patent nares, uvula midline  NECK: Supple, No JVD, no lymphadenopathy  CHEST/LUNG: Clear to auscultation bilaterally; No rales, rhonchi, wheezing, or rubs. Unlabored respirations  HEART: Regular rate and rhythm; No murmurs, rubs, or gallops. Click of mechanical valve.   ABDOMEN: Bowel sounds present; Soft, Nontender, Nondistended. No hepatomegaly  EXTREMITIES:  No pedal edema. Peripheral Pulses, brisk capillary refill. No clubbing or cyanosis.  NERVOUS SYSTEM:  Alert & Oriented X3, speech clear. No deficits. CNII-XII grossly intact.  MSK: FROM all 4 extremities, full and equal strength  SKIN: Slight stasis rash on ankles and shins bilaterally. No other lesions      11-05-19 @ 07:01  -  11-06-19 @ 07:00  --------------------------------------------------------  IN: 300 mL / OUT: 1525 mL / NET: -1225 mL      ***LABORATORY STUDIES***                        7.9    3.76  )-----------( 29       ( 06 Nov 2019 09:01 )             24.6     11-06    132<L>  |  95<L>  |  52<H>  ----------------------------<  82  4.9   |  30  |  1.03    Ca    10.6<H>      06 Nov 2019 06:50  Phos  2.9     11-06  Mg     1.9     11-06    TPro  4.7<L>  /  Alb  2.2<L>  /  TBili  2.7<H>  /  DBili  x   /  AST  136<H>  /  ALT  45  /  AlkPhos  508<H>  11-06    (11-06)  38.3<H>  --------< 3.24<H>  51.8<H>              ***IMAGING***    ***MEDICATIONS***  acetaminophen   Tablet .. 650 milliGRAM(s) Oral every 6 hours PRN  allopurinol 100 milliGRAM(s) Oral daily  atorvastatin 10 milliGRAM(s) Oral at bedtime  Biotene Dry Mouth Oral Rinse 5 milliLiter(s) Swish and Spit three times a day PRN  cefTRIAXone   IVPB 1000 milliGRAM(s) IV Intermittent every 24 hours  cholecalciferol 5000 Unit(s) Oral daily  cyanocobalamin 100 MICROGram(s) 100 MICROGram(s) Oral daily  finasteride 5 milliGRAM(s) Oral daily  folic acid 0.5 milliGRAM(s) Oral daily  furosemide   Injectable 40 milliGRAM(s) IV Push every 12 hours  pantoprazole  Injectable 40 milliGRAM(s) IV Push two times a day    HEALTH ISSUES - PROBLEM Dx:  Decompensated heart failure: Decompensated heart failure  Preventive measure: Preventive measure  BPH (benign prostatic hyperplasia): BPH (benign prostatic hyperplasia)  Dyslipidemia: Dyslipidemia  Atrial fibrillation: Atrial fibrillation  Valvular heart disease: Valvular heart disease  CAD (coronary artery disease): CAD (coronary artery disease)  HTN (hypertension): HTN (hypertension)  Fecal occult blood test positive: Fecal occult blood test positive  Cardiac cirrhosis: Cardiac cirrhosis  Acute on chronic combined systolic and diastolic congestive heart failure: Acute on chronic combined systolic and diastolic congestive heart failure JOSUE SOLOMON is a 86year old man who has been here for 3day(s).    ***ONE-LINER***  ALAN is a 86 year old man with a history of chronic systolic heart failure with recent upgrade to BiV, valvular heart disease (mechanical aortic, 1999; MV repair, 2006), on coumadin (INR goal 2.5 to 3), CAD with CABG and stents (BMSx2, LAD), chronic Afib, thrombocytopenia, gout, HLD, HTN, BPH. He is presenting here for increased dyspnea on exertion with bilateral pleural effusions, abdominal ascites, and peripheral edema in the context of a history of chronic heart failure, valvular heart disease, and CAD. This is most consistent with an acute exacerbation of his chronic systolic heart failure and is being worked up for cardiac cirrhosis, pancytopenia, and possible occult GI bleed.     ***INTERVAL HPI/OVERNIGHT EVENTS***  Overnight, the patient had 1U of blood transfused. The patient did not report any fevers, chills, rigors, or tremors but, towards the end of the transfusion, the patient did report sudden onset shortness of breath and cough. This resolved once lasix were given. Post transfusion CBC showed anemia of 7.8 and another transfusion was attempted, however patient and family refused. The night was otherwise uneventful He does report some low back pain he thinks due to the bed. On waking, he reported feeling tired, especially if he exerts himself by changing position in the bed. At some point in the morning his O2 (at 4L) fell off and he desaturated to the 80s. He reports no new stools since the last note. He feels that his ascites and pedal edema have both improved.     The patient denies dizziness, lightheadedness, shortness of breath, chest pain, abdominal pain, and palpitations.    T(F): 97.1, Max: 98 (21:56)  HR: 100 (80 - 100)  BP: 120/63 (80/40 - 120/63)  RR: 18 (16 - 20)  SpO2: 95% (82% - 100%)    ***PHYSICAL EXAM***  GENERAL: NAD, lying in bed comfortably. Well groomed.  HEAD:  Atraumatic, Normocephalic  EYES: EOMI, PERRLA, conjunctiva and sclera clear  ENT: Moist mucous membranes; patent nares, uvula midline  NECK: Supple, No JVD, no lymphadenopathy  CHEST/LUNG: Clear to auscultation bilaterally; No rales, rhonchi, wheezing, or rubs. Unlabored respirations  HEART: Regular rate and rhythm( Occasiona; No murmurs, rubs, or gallops. Click of mechanical valve.   ABDOMEN: Bowel sounds present; Soft, Nontender, Nondistended. No hepatomegaly  EXTREMITIES:  No pedal edema. Peripheral Pulses, brisk capillary refill. No clubbing or cyanosis.  NERVOUS SYSTEM:  Alert & Oriented X3, speech clear. No deficits. CNII-XII grossly intact.  MSK: FROM all 4 extremities, full and equal strength  SKIN: Slight stasis rash on ankles and shins bilaterally. No other lesions      11-05-19 @ 07:01  -  11-06-19 @ 07:00  --------------------------------------------------------  IN: 300 mL / OUT: 1525 mL / NET: -1225 mL      ***LABORATORY STUDIES***                        7.9    3.76  )-----------( 29       ( 06 Nov 2019 09:01 )             24.6     11-06    132<L>  |  95<L>  |  52<H>  ----------------------------<  82  4.9   |  30  |  1.03    Ca    10.6<H>      06 Nov 2019 06:50  Phos  2.9     11-06  Mg     1.9     11-06    TPro  4.7<L>  /  Alb  2.2<L>  /  TBili  2.7<H>  /  DBili  x   /  AST  136<H>  /  ALT  45  /  AlkPhos  508<H>  11-06    (11-06)  38.3<H>  --------< 3.24<H>  51.8<H>              ***IMAGING***    ***MEDICATIONS***  acetaminophen   Tablet .. 650 milliGRAM(s) Oral every 6 hours PRN  allopurinol 100 milliGRAM(s) Oral daily  atorvastatin 10 milliGRAM(s) Oral at bedtime  Biotene Dry Mouth Oral Rinse 5 milliLiter(s) Swish and Spit three times a day PRN  cefTRIAXone   IVPB 1000 milliGRAM(s) IV Intermittent every 24 hours  cholecalciferol 5000 Unit(s) Oral daily  cyanocobalamin 100 MICROGram(s) 100 MICROGram(s) Oral daily  finasteride 5 milliGRAM(s) Oral daily  folic acid 0.5 milliGRAM(s) Oral daily  furosemide   Injectable 40 milliGRAM(s) IV Push every 12 hours  pantoprazole  Injectable 40 milliGRAM(s) IV Push two times a day    HEALTH ISSUES - PROBLEM Dx:  Decompensated heart failure: Decompensated heart failure  Preventive measure: Preventive measure  BPH (benign prostatic hyperplasia): BPH (benign prostatic hyperplasia)  Dyslipidemia: Dyslipidemia  Atrial fibrillation: Atrial fibrillation  Valvular heart disease: Valvular heart disease  CAD (coronary artery disease): CAD (coronary artery disease)  HTN (hypertension): HTN (hypertension)  Fecal occult blood test positive: Fecal occult blood test positive  Cardiac cirrhosis: Cardiac cirrhosis  Acute on chronic combined systolic and diastolic congestive heart failure: Acute on chronic combined systolic and diastolic congestive heart failure

## 2019-11-06 NOTE — CHART NOTE - NSCHARTNOTEFT_GEN_A_CORE
Upon Nutritional Assessment by the Registered Dietitian your patient was determined to meet criteria / has evidence of the following diagnosis/diagnoses:          [ ]  Mild Protein Calorie Malnutrition        [ ]  Moderate Protein Calorie Malnutrition        [x] Severe Protein Calorie Malnutrition        [ ] Unspecified Protein Calorie Malnutrition        [ ] Underweight / BMI <19        [ ] Morbid Obesity / BMI > 40      Findings as based on:  [x] Comprehensive nutrition assessment   [ ] Nutrition Focused Physical Exam  [x] Other:   Etiology: inability to meet estimated nutrient needs in setting of CHF and SOB.    Signs/Symptoms: <75% PO intake >1 month, moderate muscle/fat wasting.     Nutrition Plan/Recommendations:    1) Collaboration of Care: Recommend obtaining swallow evaluation- pt's family is with concerns of pt's swallowing function, pt is ordered for dysphagia diet.   2) Continue diet for now- defer texture to SLP   3) Recommend Ensure Enlive x2 daily - [place] thick  4) RD will add magic cup    Alondra Rodriges RD. Pager: [phone]     PROVIDER Section:     By signing this assessment you are acknowledging and agree with the diagnosis/diagnoses assigned by the Registered Dietitian    Comments:

## 2019-11-06 NOTE — DIETITIAN INITIAL EVALUATION ADULT. - ADD RECOMMEND
1) Collaboration of Care: Recommend obtaining swallow evaluation- pt's family is with concerns of pt's swallowing function, pt is ordered for dysphagia diet. 2) Continue diet for now- defer texture to SLP 3) Recommend Ensure Enlive x2 daily 4) RD will add magic cup 5) Malnutrition alert placed in chart

## 2019-11-06 NOTE — DIETITIAN INITIAL EVALUATION ADULT. - REASON INDICATOR FOR ASSESSMENT
Nutrition consult received for CHF education.   Source: EMR, pt's daughter and family at bedside  Per chart, 87 y/o male "presenting with SOB, admitted for decompensated CHF, with course c/b GI bleed and labs notable for pancytopenia."  PMH: chronic systolic heart failure with PPM and recent upgrade to BiV, valvular heart disease, on coumadin, HLD, HTN, BPH, CAD s/p CABG

## 2019-11-06 NOTE — PROGRESS NOTE ADULT - SUBJECTIVE AND OBJECTIVE BOX
Patient feeling "slightly" better but still very weak.  Received one unit of blood yesterday (-had some SOB after transfusion).  Physical therapy consulted yesterday.    Vital Signs Last 24 Hrs  T(C): 36.7 (06 Nov 2019 03:48), Max: 36.7 (05 Nov 2019 21:56)  T(F): 98 (06 Nov 2019 03:48), Max: 98 (05 Nov 2019 21:56)  HR: 91 (06 Nov 2019 06:21) (70 - 91)  BP: 105/69 (06 Nov 2019 06:21) (97/63 - 110/73)  BP(mean): --  RR: 18 (06 Nov 2019 06:21) (16 - 18)  SpO2: 100% (06 Nov 2019 06:21) (98% - 100%)  Daily     Daily   I&O's Summary    05 Nov 2019 07:01  -  06 Nov 2019 07:00  --------------------------------------------------------  IN: 300 mL / OUT: 1525 mL / NET: -1225 mL        Neck: no bruits, JVD, adenopathy  Chest: scant left basilar crackles  Cor: [id number], RR, normal S1S2 with w/ III/VI MR murmur no rght  Abd: soft, nontender, BS+ and no HSM  Ext: w/o CC trace edema  Pulses:2+->dp bilaterally    MEDICATIONS  (STANDING):  allopurinol 100 milliGRAM(s) Oral daily  atorvastatin 10 milliGRAM(s) Oral at bedtime  cholecalciferol 5000 Unit(s) Oral daily  cyanocobalamin 100 MICROGram(s) 100 MICROGram(s) Oral daily  finasteride 5 milliGRAM(s) Oral daily  folic acid 0.5 milliGRAM(s) Oral daily  furosemide   Injectable 40 milliGRAM(s) IV Push every 12 hours  pantoprazole  Injectable 40 milliGRAM(s) IV Push two times a day    MEDICATIONS  (PRN):  acetaminophen   Tablet .. 650 milliGRAM(s) Oral every 6 hours PRN Mild Pain (1 - 3), Moderate Pain (4 - 6)  Biotene Dry Mouth Oral Rinse 5 milliLiter(s) Swish and Spit three times a day PRN Dry mouth      11-05    132<L>  |  94<L>  |  55<H>  ----------------------------<  86  4.6   |  29  |  1.01    Ca    9.9      05 Nov 2019 09:56  Phos  3.2     11-05  Mg     1.9     11-05    TPro  4.6<L>  /  Alb  2.3<L>  /  TBili  2.5<H>  /  DBili  x   /  AST  135<H>  /  ALT  44  /  AlkPhos  499<H>  11-05                          7.8    3.33  )-----------( 28       ( 05 Nov 2019 18:05 )             24.2     PT/INR - ( 05 Nov 2019 11:23 )   PT: 38.4 sec;   INR: 3.22 ratio         PTT - ( 05 Nov 2019 11:23 )  PTT:51.4 sec    HEALTH ISSUES - PROBLEM Dx:    Decompensated heart failure: Continue IV Lasix for now; heart failure consult raised question of constrictive/ restrictive picture which is consistent with clinical picture (preserved systolic function, massive biatrial enlargement and cirrhosis) but no obvious etiology (-?hemochromatosis) .    Atrial fibrillation: heparin for now  CAD (coronary artery disease): No evidence of active ischemic disease  Fecal occult blood test positive: Awaiting today's labs and would transfuse again (possibly with IV Lasix before transfusion in view of yesterday' response).  I have called Dr. Luis Fernando Xiong to evaluate patient for pancytopenia and ?iron overload  Acute on chronic combined systolic and diastolic congestive heart failure: continue diuresis and transfuse to Hgb>>=8; physical therapy; consider inotrope as noted previously if status plateaus.

## 2019-11-06 NOTE — PROGRESS NOTE ADULT - ASSESSMENT
86 year old man with medical history of chronic systolic heart failure with PPM and recent upgrade to BiV, valvular heart disease (s/p mechanical aortic valve replacement in 1999 and MV repair in 2006) on coumadin INR goal 2.5 to 3, CAD s/p CABG/Stent BMS x 2 to LAD chronic atrial fibrillation, gout, HLD, HTN, BPH, presenting with dyspnea on exertion and dark stool over the last few days.  + NSAIDs use  Hgb dropped from 12.4 on 09/14 to 9.6 on admission, dropped to 7.8 11/04.  Elevated BUN   Supratherapeutic INR : 7.5 on 10/29 to 3.3 today 11/04 ( Coumadin is on hold)  No known history of cirrhosis from before.  Echo: Mild-moderate mitral regurgitation.s/p annuloplasty ring is seen in the mitral position. Mechanical aortic valve prosthesis with mild aortic regurgitation. Severely dilated left atrium and severe right atrial enlargement. Moderate pulmonary hypertension. Moderate-severe tricuspid regurgitation.    Iron studies: Ferritin 945, Iron: 110,( 169), Transferrin saturation: 58%      Impression:  # Pancytopenia DDx: Cirrhosis vs bone marrow suppression MDS,  bone marrow infiltrative disease  # Acute blood loss anemia s/p 1 unit PRBC with H& H stable over the last 24 hours - Not actively bleeding, recent GI bleed ? #  DDx:, gastric or duodenal ulcers, erosive gastropathy, GAVE, Variceal bleed, Small bowel angioectasia    # Acute CHF exacerbation.  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 MELD-Na _27   Less likely due to hemochromatosis or Iron overload with ferritin of 945.        - varices: unknown         - ascites: yes U/S and CT        - SPE: No        - HCC: No  U/S and CT  # s/p mechanical aortic valve replacement   # CAD s/p CABG/Stent BMS x 2 to LAD   # A fib    Recommendations-  addendum:  - Continue with medical management blood and platelets transfusion for active bleeding.  - Obtain Hematology consult.  - INR daily. Anticoagulation as per cardiology.  - IV PPI 40 mg bid  - Start IV Ceftriaxone 1 g daily    - Continue to monitor h&h and transfuse if hgb drop below 7   - trend CBC, INR, CMP   - High risk to perform EGD or paracentesis for now. (Risks outweighs benefits discussed with cardiology, patient and his family.) s  - avoid all hepatotoxic agents  - pt will need hep A and hep B vaccination series if non-immune, ultrasound q6 months for HCC screening

## 2019-11-06 NOTE — CONSULT NOTE ADULT - ASSESSMENT
MV repair in 2006) on coumadin, CAD s/p CABG/Stent BMS x 2 to LAD, atrial fibrillation, to BiV, valvular heart disease (s/p mechanical aortic valve replacement in 1999 and MV repair in 2006) on coumadin, CAD s/p CABG/Stent BMS x 2 to LAD, afib, gout, HLD, HTN, BPH, pw dyspnea on exertion being treated for CHF. Hematology consulted for anemia, thrombocytopenia.    # Anemia  -baseline Hgb 11-12 in 9/2019  -likely blood loss anemia with positive FOBT.   -colonoscopy years ago was reportedly normal  -GI following  -keep Hgb >7  -retic 2.7% in appropriately normal  -check iron studies, ferritin, B12, folate  -will review peripheral smear    # thrombocytopenia  -appears chronic with baseline 60s-110s dating back to 2012  -plts now in the 20s, likely consumption in the setting of GI bleed  -will review peripheral smear to r/o other causes of thrombocytopenia   -keep plt above 20 in the setting of GI bleed MV repair in 2006) on coumadin, CAD s/p CABG/Stent BMS x 2 to LAD, atrial fibrillation, to BiV, valvular heart disease (s/p mechanical aortic valve replacement in 1999 and MV repair in 2006) on coumadin, CAD s/p CABG/Stent BMS x 2 to LAD, afib, gout, HLD, HTN, BPH, pw dyspnea on exertion being treated for CHF. Hematology consulted for anemia, thrombocytopenia.    # Anemia  -baseline Hgb 11-12 in 9/2019  -likely blood loss anemia with positive FOBT.   -colonoscopy years ago was reportedly normal  -GI following  -keep Hgb >7  -retic 2.7% in appropriately normal  -check iron studies, ferritin, B12, folate, LDH, haptoglobin  -will review peripheral smear    # thrombocytopenia  -appears chronic with baseline 60s-110s dating back to 2012  -plts now in the 20s, likely consumption in the setting of GI bleed  -will review peripheral smear to r/o other causes of thrombocytopenia   -keep plt above 20 in the setting of GI bleed 87 YO M PMH of chronic systolic heart failure with PPM and recent upgrade to BiV, valvular heart disease (s/p mechanical aortic valve replacement in 1999 and MV repair in 2006) on coumadin, CAD s/p CABG/Stent BMS x 2 to LAD, afib, gout, HLD, HTN, BPH, pw dyspnea on exertion being treated for CHF. Hematology consulted for anemia, thrombocytopenia.    # Anemia  -baseline Hgb 11-12 in 9/2019  -likely blood loss anemia with positive FOBT.   -colonoscopy years ago was reportedly normal  -GI following  -keep Hgb >7  -retic 2.7% in appropriately normal  -check iron studies, ferritin, B12, folate, LDH, haptoglobin  -will review peripheral smear    # thrombocytopenia  -appears chronic with baseline 60s-110s dating back to 2012  -plts now in the 20s, likely consumption in the setting of GI bleed  -will review peripheral smear to r/o other causes of thrombocytopenia   -keep plt above 20 in the setting of GI bleed      Marty Castellano MD. PGY 5  Heme-Onc fellow  [phone]; [id number] YO M PMH of chronic systolic heart failure with PPM and recent upgrade to BiV, valvular heart disease (s/p mechanical aortic valve replacement in 1999 and MV repair in 2006) on coumadin, CAD s/p CABG/Stent BMS x 2 to LAD, afib, gout, HLD, HTN, BPH, pw dyspnea on exertion being treated for CHF. Hematology consulted for anemia, thrombocytopenia.    # Anemia  -baseline Hgb 11-12 in 9/2019  -likely blood loss anemia with positive FOBT.   -colonoscopy years ago was reportedly normal  -GI following  -keep Hgb >7  -retic 2.7% which inappropriately in the normal range  -check iron studies, ferritin, B12, folate, LDH, haptoglobin  -will review peripheral smear    # thrombocytopenia  -appears chronic with baseline 60s-110s dating back to 2012  -plts now in the 20s, likely consumption in the setting of GI bleed  -will review peripheral smear to r/o other causes of thrombocytopenia   -keep plt above 20 in the setting of GI bleed      Marty Castellano MD. PGY 5  Heme-Onc fellow  [phone]; [phone] YO M PMH of chronic systolic heart failure with PPM and recent upgrade to BiV, valvular heart disease (s/p mechanical aortic valve replacement in 1999 and MV repair in 2006) on coumadin, CAD s/p CABG/Stent BMS x 2 to LAD, afib, gout, HLD, HTN, BPH, pw dyspnea on exertion being treated for CHF. Hematology consulted for anemia, thrombocytopenia.    # Anemia  -baseline Hgb 11-12 in 9/2019  -likely blood loss anemia with positive FOBT.   -colonoscopy years ago was reportedly normal  -GI following  -keep Hgb >7  -retic 2.7% which inappropriately in the normal range  -check iron studies, ferritin, B12, folate, LDH, haptoglobin    # thrombocytopenia  -appears chronic with baseline 60s-110s dating back to 2012  -plts now in the 20s,   -peripheral smear (11/6) showed heavy basophilic stipling, sperocytosis, decreased plt, increased monocytes, normal appearing granulocytes.  -the smears findings are concerning for underlying sideroblastic anemia progressed into MDS  -discussed the findings with patient and family that we would need to perform a BM bx for diagnosis and possible treatment. Family is concerned about the procedure given pt's debilitated state as well as in the event that pt requires chemotherapy he may not be able to tolerate the treatment in his current condition. The patient and family would like Dr. Mireles to be involve in the discussion. Called Dr. Mireles's office and left contact for him to call back      Marty Castellano MD. PGY 5  Heme-Onc fellow  [phone]; [phone] YO M PMH of chronic systolic heart failure with PPM and recent upgrade to BiV, valvular heart disease (s/p mechanical aortic valve replacement in 1999 and MV repair in 2006) on coumadin, CAD s/p CABG/Stent BMS x 2 to LAD, afib, gout, HLD, HTN, BPH, pw dyspnea on exertion being treated for CHF. Hematology consulted for anemia, thrombocytopenia.    # Anemia  -baseline Hgb 11-12 in 9/2019  -likely blood loss anemia with positive FOBT.   -colonoscopy years ago was reportedly normal  -GI following  -keep Hgb >7  -retic 2.7% which inappropriately in the normal range  -check iron studies, ferritin, B12, folate, LDH, haptoglobin, Coomb's test    # thrombocytopenia  -appears chronic with baseline 60s-110s dating back to 2012  -plts now in the 20s,   -peripheral smear (11/6) showed heavy basophilic stipling, sperocytosis, decreased plt, increased monocytes, normal appearing granulocytes.  -the smears findings are concerning for underlying sideroblastic anemia progressed into MDS  -discussed the findings with patient and family that we would need to perform a BM bx for diagnosis and possible treatment. Family is concerned about the procedure given pt's debilitated state as well as in the event that pt requires chemotherapy he may not be able to tolerate the treatment in his current condition. The patient and family would like Dr. Mireles to be involve in the discussion. Called Dr. Mireles's office and left contact for him to call back      Marty Castellano MD. PGY 5  Heme-Onc fellow  [phone]; [phone]

## 2019-11-06 NOTE — CHART NOTE - NSCHARTNOTEFT_GEN_A_CORE
Nurse notified that pt was found to have 10 beats WCT on tele. Earlier this evening pt had 6 beats WCT. Vitals on encounter: BP 96/61, HR 88. O2 sat 97% on 4L NC. Pt seen and examined at bedside. Per family pt mentating at baseline, no symptoms. Labs earlier showed hyperkalemia, however specimen was moderately hemolyzed. Repeat with K at 5.1. Called cardiologist Dr. Mireles, agreed to give 12.5 mg Lopressor one-time dose and address in AM.    Nikki Kimura, PGY-1

## 2019-11-06 NOTE — PROGRESS NOTE ADULT - SUBJECTIVE AND OBJECTIVE BOX
Subjective:    Medications:  acetaminophen   Tablet .. 650 milliGRAM(s) Oral every 6 hours PRN  allopurinol 100 milliGRAM(s) Oral daily  atorvastatin 10 milliGRAM(s) Oral at bedtime  Biotene Dry Mouth Oral Rinse 5 milliLiter(s) Swish and Spit three times a day PRN  cefTRIAXone   IVPB 1000 milliGRAM(s) IV Intermittent every 24 hours  cholecalciferol 5000 Unit(s) Oral daily  cyanocobalamin 100 MICROGram(s) 100 MICROGram(s) Oral daily  finasteride 5 milliGRAM(s) Oral daily  folic acid 0.5 milliGRAM(s) Oral daily  furosemide   Injectable 60 milliGRAM(s) IV Push every 8 hours  pantoprazole  Injectable 40 milliGRAM(s) IV Push two times a day      Physical Exam:    Vital Signs Last 24 Hrs  T(C): 36.7 (06 Nov 2019 03:48), Max: 36.7 (05 Nov 2019 21:56)  T(F): 98 (06 Nov 2019 03:48), Max: 98 (05 Nov 2019 21:56)  HR: 81 (06 Nov 2019 09:25) (70 - 91)  BP: 109/63 (06 Nov 2019 09:50) (80/40 - 110/73)  BP(mean): --  RR: 20 (06 Nov 2019 09:26) (16 - 20)  SpO2: 90% (06 Nov 2019 09:26) (82% - 100%)    Daily     Daily     I&O's Summary    05 Nov 2019 07:01  -  06 Nov 2019 07:00  --------------------------------------------------------  IN: 300 mL / OUT: 1525 mL / NET: -1225 mL        Labs:                        7.9    x     )-----------( 29       ( 06 Nov 2019 09:01 )             24.6     11-06    132<L>  |  95<L>  |  52<H>  ----------------------------<  82  4.9   |  30  |  1.03    Ca    10.6<H>      06 Nov 2019 06:50  Phos  2.9     11-06  Mg     1.9     11-06    TPro  4.7<L>  /  Alb  2.2<L>  /  TBili  2.7<H>  /  DBili  x   /  AST  136<H>  /  ALT  45  /  AlkPhos  508<H>  11-06    PT/INR - ( 06 Nov 2019 08:49 )   PT: 38.3 sec;   INR: 3.24 ratio         PTT - ( 06 Nov 2019 08:49 )  PTT:51.8 sec      Serum Pro-Brain Natriuretic Peptide: 2019 pg/mL (11-04 @ 06:30) Subjective: Patient transfused 1 unit prbc overnight, after which he was complaining of SOB and intractable coughing. He received 40 IV lasix last night and this AM. No other acute events.    Medications:  acetaminophen   Tablet .. 650 milliGRAM(s) Oral every 6 hours PRN  allopurinol 100 milliGRAM(s) Oral daily  atorvastatin 10 milliGRAM(s) Oral at bedtime  Biotene Dry Mouth Oral Rinse 5 milliLiter(s) Swish and Spit three times a day PRN  cefTRIAXone   IVPB 1000 milliGRAM(s) IV Intermittent every 24 hours  cholecalciferol 5000 Unit(s) Oral daily  cyanocobalamin 100 MICROGram(s) 100 MICROGram(s) Oral daily  finasteride 5 milliGRAM(s) Oral daily  folic acid 0.5 milliGRAM(s) Oral daily  furosemide   Injectable 60 milliGRAM(s) IV Push every 8 hours  pantoprazole  Injectable 40 milliGRAM(s) IV Push two times a day    PHYSICAL EXAM:  GENERAL: frail, cachectic elderly man  HEAD:  Atraumatic, Normocephalic  EYES: EOMI, conjunctiva and sclera clear  NECK: Supple, JVP elevated to jawline  CHEST/LUNG: Decreased breath sounds b/l; No wheeze, ronchi or rales  HEART: Regular rate and rhythm; No murmurs, rubs, or gallops  ABDOMEN: Soft, Nontender, distended; Bowel sounds present  EXTREMITIES:  2+ Peripheral Pulses, No clubbing, cyanosis  NEUROLOGY: non-focal  SKIN: No rashes or lesions    Vital Signs Last 24 Hrs  T(C): 36.7 (06 Nov 2019 03:48), Max: 36.7 (05 Nov 2019 21:56)  T(F): 98 (06 Nov 2019 03:48), Max: 98 (05 Nov 2019 21:56)  HR: 81 (06 Nov 2019 09:25) (70 - 91)  BP: 109/63 (06 Nov 2019 09:50) (80/40 - 110/73)  BP(mean): --  RR: 20 (06 Nov 2019 09:26) (16 - 20)  SpO2: 90% (06 Nov 2019 09:26) (82% - 100%)    Daily     Daily     I&O's Summary    05 Nov 2019 07:01  -  06 Nov 2019 07:00  --------------------------------------------------------  IN: 300 mL / OUT: 1525 mL / NET: -1225 mL        Labs:                        7.9    x     )-----------( 29       ( 06 Nov 2019 09:01 )             24.6     11-06    132<L>  |  95<L>  |  52<H>  ----------------------------<  82  4.9   |  30  |  1.03    Ca    10.6<H>      06 Nov 2019 06:50  Phos  2.9     11-06  Mg     1.9     11-06    TPro  4.7<L>  /  Alb  2.2<L>  /  TBili  2.7<H>  /  DBili  x   /  AST  136<H>  /  ALT  45  /  AlkPhos  508<H>  11-06    PT/INR - ( 06 Nov 2019 08:49 )   PT: 38.3 sec;   INR: 3.24 ratio         PTT - ( 06 Nov 2019 08:49 )  PTT:51.8 sec      Serum Pro-Brain Natriuretic Peptide: 2019 pg/mL (11-04 @ 06:30)

## 2019-11-06 NOTE — PROGRESS NOTE ADULT - ASSESSMENT
ALAN is a 86 year old man with a history of chronic systolic heart failure with recent upgrade to BiV, valvular heart disease (mechanical aortic, 1999; MV repair, 2006), on coumadin (INR goal 2.5 to 3), CAD with CABG and stents (BMSx2, LAD), chronic Afib, thrombocytopenia, gout, HLD, HTN, BPH.    He is presenting here for increased dyspnea on exertion with bilateral pleural effusions, abdominal ascites, and peripheral edema in the context of a history of chronic heart failure, valvular heart disease, and CAD. This is most consistent with an acute exacerbation of his chronic systolic heart failure (class IV, stage D). The patient has CT and laboratory evidence of cirrhosis, likely secondary to his CHF exacerbation, though autoimmune, metabolic, and infectious etiologies are being pursued. Given the steady drop in hemoglobin, the persistent weakness, and the dark stools, this patient is being worked up for a GI bleed; the stool color has lightened somewhat and the hemoglobin levels have stabilized somewhat. The patient was transfused 1U yesterday 11/5 for Hb < 8 which was complicated by shortness of breath and cough following transfusion. Another transfusion of 1/2U planned for today 11/6 for Hb of 7.9. The patient is responding well to IV diuresis at this point. Invasive procedures are being held at this point due to a low platelet count and elevated INR (goal is <2). INR stable at 3.2-3.3 despite no warfarin since 10/29.

## 2019-11-06 NOTE — DIETITIAN INITIAL EVALUATION ADULT. - PROBLEM SELECTOR PLAN 10
# Gout- c/w Allopurinol 100mg    Diet- Low sodium  DVT- Holding coumadin, on SCDs    Davie Huggins MD PGY-2  Department of Internal Medicine  Pager: [phone] (NS) /[phone] (MILAN)

## 2019-11-06 NOTE — DIETITIAN INITIAL EVALUATION ADULT. - DIET TYPE
Dysphagia 3- Nectar thick. Previously was on clear liquid DASH diet 11/4 and clear liquid DASH NC (11/5)

## 2019-11-06 NOTE — PROGRESS NOTE ADULT - PROBLEM SELECTOR PLAN 1
Patient remains volume overloaded, which can explain component of SOB. He's also anemic and pancytopenic.  - Patient remains volume overloaded. Became SOB and intractable coughing after prbc infusion.  - Recommend increasing lasix to 60 IV q8hrs. Patient remains volume overloaded, which can explain component of SOB. He's also anemic and pancytopenic.  - Recommend increasing lasix to 60 IV q8hrs.  - Plan to increase BiV device to 80 to augment CO. Patient currently in Afib (HR 80-100s) on telemetry.  - d/c Entresto and coreg given hypotension

## 2019-11-06 NOTE — PROGRESS NOTE ADULT - ASSESSMENT
87 yo man PMH of chronic systolic heart failure with PPM and recent upgrade to BiV, valvular heart disease (s/p mechanical aortic valve replacement in 1999 and MV repair in 2006) on coumadin, CAD s/p CABG/Stent BMS x 2 to LAD, chronic atrial fibrillation, HLD, HTN, BPH, Gout presenting with SOB, admitted for decompensated CHF, with course c/b GI bleed and labs notable for pancytopenia.

## 2019-11-06 NOTE — DIETITIAN INITIAL EVALUATION ADULT. - PROBLEM SELECTOR PLAN 2
CT A/P revealing cirrhosis, found to have worsening thrombocytopenia and leukopenia, transaminitis with enlarged liver noted on exam. No history of liver disease in the past. Worsening alk phos and bilirubin, low albumin.   - monitor CMP daily  - no indications for platelet transfusion at this moment  - Given alk phos elevation, will obtain GGT  - Hepatology c/s in the AM

## 2019-11-06 NOTE — PROGRESS NOTE ADULT - SUBJECTIVE AND OBJECTIVE BOX
Interval Events:   dark brown bm  No abdominal pain, but complains of generalized weakness and SOB  Tolerating PO diet, No N/V    Hospital Medications:  acetaminophen   Tablet .. 650 milliGRAM(s) Oral every 6 hours PRN  allopurinol 100 milliGRAM(s) Oral daily  atorvastatin 10 milliGRAM(s) Oral at bedtime  Biotene Dry Mouth Oral Rinse 5 milliLiter(s) Swish and Spit three times a day PRN  cefTRIAXone   IVPB 1000 milliGRAM(s) IV Intermittent every 24 hours  cholecalciferol 5000 Unit(s) Oral daily  cyanocobalamin 100 MICROGram(s) 100 MICROGram(s) Oral daily  finasteride 5 milliGRAM(s) Oral daily  folic acid 0.5 milliGRAM(s) Oral daily  furosemide   Injectable 40 milliGRAM(s) IV Push every 12 hours  pantoprazole  Injectable 40 milliGRAM(s) IV Push two times a day        ROS:   General:  No fevers, chills or night sweats  ENT:  No sore throat or dysphagia  CV:  No pain or palpitations  Resp:  + dyspnea  GI:  as above  Skin:  No rash or edema  Neuro: no weakness   Hematologic: no bleeding  Musculoskeletal: + weakness  Psych: no agitation      PHYSICAL EXAM:   Vital Signs:  Vital Signs Last 24 Hrs  T(C): 36.7 (06 Nov 2019 03:48), Max: 36.7 (05 Nov 2019 21:56)  T(F): 98 (06 Nov 2019 03:48), Max: 98 (05 Nov 2019 21:56)  HR: 91 (06 Nov 2019 06:21) (70 - 91)  BP: 105/69 (06 Nov 2019 06:21) (97/63 - 110/73)  BP(mean): --  RR: 18 (06 Nov 2019 06:21) (16 - 18)  SpO2: 100% (06 Nov 2019 06:21) (98% - 100%)  Daily     Daily     GENERAL: mild respiratory distress  HEENT:  NC/AT,  conjunctivae clear and pink, sclera -anicteric  CHEST:  bibasilar crackles   HEART:  RRR, S1 + S2, no murmurs  ABDOMEN:  Soft, non-tender, mildly-distended, normoactive bowel sounds,  no masses  EXTREMITIES:  no cyanosis or edema  SKIN:  Warm & Dry. No rash or erythema  NEURO:  Alert, oriented    LABS:                        7.8    3.33  )-----------( 28       ( 05 Nov 2019 18:05 )             24.2     Mean Cell Volume: 89.6 fl (11-05-19 @ 18:05)    11-06    132<L>  |  95<L>  |  52<H>  ----------------------------<  82  4.9   |  30  |  1.03    Ca    10.6<H>      06 Nov 2019 06:50  Phos  2.9     11-06  Mg     1.9     11-06    TPro  4.7<L>  /  Alb  2.2<L>  /  TBili  2.7<H>  /  DBili  x   /  AST  136<H>  /  ALT  45  /  AlkPhos  508<H>  11-06    LIVER FUNCTIONS - ( 06 Nov 2019 06:50 )  Alb: 2.2 g/dL / Pro: 4.7 g/dL / ALK PHOS: 508 U/L / ALT: 45 U/L / AST: 136 U/L / GGT: x           PT/INR - ( 05 Nov 2019 11:23 )   PT: 38.4 sec;   INR: 3.22 ratio         PTT - ( 05 Nov 2019 11:23 )  PTT:51.4 sec    Amylase Serum--      Lipase serum--       [id number]                          7.8    3.33  )-----------( 28       ( 05 Nov 2019 18:05 )             24.2                         7.4    4.16  )-----------( 29       ( 05 Nov 2019 11:29 )             22.9                         7.2    3.47  )-----------( 26       ( 05 Nov 2019 02:53 )             21.4                         7.4    2.81  )-----------( 26       ( 04 Nov 2019 17:56 )             22.3                         7.8    2.83  )-----------( 30       ( 04 Nov 2019 08:16 )             23.7       Imaging:

## 2019-11-06 NOTE — DIETITIAN INITIAL EVALUATION ADULT. - ETIOLOGY
inability to meet estimated nutrient needs inability to meet estimated nutrient needs in setting of CHF and SOB

## 2019-11-06 NOTE — CONSULT NOTE ADULT - ATTENDING COMMENTS
Patient seen and examined with the heme consult team. Many family members present at the bedside. Admitted with pancytopenia, long h/o thrombocytopenia, evaluated by Avi Johnson in the past. He had a PPM change in September and has a significant decline in functional status. The peripheral blood smear was reviewed. No NRBC, no blasts. 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 He is in poor physical shape and would not likely tolerate Dacogen treatment, and for this reason, the family would prefer not to have us perform a bone marrow aspiration and biopsy. Comfort measures and supportive transfusion are recommended. The family asked that we contact Dr Chele Mireles, his PCP. Dr Castellano called his office and left a contact number.
Briefly, 85 y/o M h/o reported HFrEF (EF previously 33%, now improvd to 60%), prior CHB s/p PPM with recent upgrade to BiV 9/13/19), prior valvular heart disease (s/p mechanical aortic valve replacement in 1999 and MV repair in 2006) on coumadin, CAD s/p CABG 1999/Stent BMS x 2 to LAD, chronic atrial fibrillation, who presented with weakness/fatigue with short distance. Per family, had been deteriorating over past year but more over past 2 month after BiV upgrade. Developed fluid retention in abdomen and was switched from lasix to torsemide with good effect. Denies orthopnea/PND. Reports good compliance with his medications. Followed by Dr. Mireles. Since arrival, urinated well to lasix IV. On exam, frail appearing, NAD, JvP approx 12-14 with HJR, irreg irreg rhythm, dec BS b/l, distended abdomen, nontender, 1+ ankle edema, slightly cool to palpation. Labs reviewed - notably pancytopenic (WBC 2, Hb 7, PLT 20s), BUN/Cr 40/0.9, AST/ALT midlly elevated, alk phos 500s, Tbili 2.5, albumin 2.3. Imaging notable for cirrhosis. TTE reviewed - marked biatrial enlargement, nl LV function (approx 50%), mod MR, mod TR, mitral ring, mechanical Ao valve. Overall stage D HF, NYHA class IV with TTE suggestive of a constrictive/restrictive picture and volume overload.  - increase lasix as above  - will increase BiV device to 80 to augment CO  - d/c Entresto for now given hypotension  - c/w coreg 6.25 mg bid; may switch to toprol if BP limiting  - overall disease process d/w family

## 2019-11-06 NOTE — PROGRESS NOTE ADULT - ATTENDING COMMENTS
I have seen and examined the patient with Mr. Bernard ( MARI) and resident Dr Lomas.  continue IV Lasix   monitor BMP  Cardiology note appreciated   PRBC transfusion as patient has symptomatic anemia   monitor CBC  Haem consult appreciated   d/w patient's daughter

## 2019-11-06 NOTE — DIETITIAN INITIAL EVALUATION ADULT. - PHYSICAL APPEARANCE
other (specify)/underweight Nutrition-focused physical exam inappropriate at this time, pt was sleeping. Visually noted moderate-muscle wasting of temples and moderate fat wasting to orbital and buccal regions. Pt overall has thin appearance.

## 2019-11-07 DIAGNOSIS — K74.60 UNSPECIFIED CIRRHOSIS OF LIVER: ICD-10-CM

## 2019-11-07 DIAGNOSIS — R53.2 FUNCTIONAL QUADRIPLEGIA: ICD-10-CM

## 2019-11-07 DIAGNOSIS — Z71.89 OTHER SPECIFIED COUNSELING: ICD-10-CM

## 2019-11-07 DIAGNOSIS — D62 ACUTE POSTHEMORRHAGIC ANEMIA: ICD-10-CM

## 2019-11-07 DIAGNOSIS — Z51.5 ENCOUNTER FOR PALLIATIVE CARE: ICD-10-CM

## 2019-11-07 LAB
ALBUMIN SERPL ELPH-MCNC: 1.8 G/DL — LOW (ref 3.3–5)
ALBUMIN SERPL ELPH-MCNC: 1.9 G/DL — LOW (ref 3.3–5)
ALP SERPL-CCNC: 414 U/L — HIGH (ref 40–120)
ALP SERPL-CCNC: 417 U/L — HIGH (ref 40–120)
ALT FLD-CCNC: 43 U/L — SIGNIFICANT CHANGE UP (ref 10–45)
ALT FLD-CCNC: 46 U/L — HIGH (ref 10–45)
AMMONIA BLD-MCNC: 33 UMOL/L — SIGNIFICANT CHANGE UP (ref 11–55)
ANA PAT FLD IF-IMP: ABNORMAL
ANA TITR SER: ABNORMAL
ANION GAP SERPL CALC-SCNC: 12 MMOL/L — SIGNIFICANT CHANGE UP (ref 5–17)
ANION GAP SERPL CALC-SCNC: 13 MMOL/L — SIGNIFICANT CHANGE UP (ref 5–17)
APTT BLD: 52 SEC — HIGH (ref 27.5–36.3)
AST SERPL-CCNC: 142 U/L — HIGH (ref 10–40)
AST SERPL-CCNC: 152 U/L — HIGH (ref 10–40)
BASOPHILS # BLD AUTO: 0 K/UL — SIGNIFICANT CHANGE UP (ref 0–0.2)
BASOPHILS # BLD AUTO: 0 K/UL — SIGNIFICANT CHANGE UP (ref 0–0.2)
BASOPHILS NFR BLD AUTO: 0 % — SIGNIFICANT CHANGE UP (ref 0–2)
BASOPHILS NFR BLD AUTO: 0 % — SIGNIFICANT CHANGE UP (ref 0–2)
BILIRUB SERPL-MCNC: 2.9 MG/DL — HIGH (ref 0.2–1.2)
BILIRUB SERPL-MCNC: 3.1 MG/DL — HIGH (ref 0.2–1.2)
BUN SERPL-MCNC: 80 MG/DL — HIGH (ref 7–23)
BUN SERPL-MCNC: 90 MG/DL — HIGH (ref 7–23)
CALCIUM SERPL-MCNC: 10.3 MG/DL — SIGNIFICANT CHANGE UP (ref 8.4–10.5)
CALCIUM SERPL-MCNC: 10.8 MG/DL — HIGH (ref 8.4–10.5)
CHLORIDE SERPL-SCNC: 96 MMOL/L — SIGNIFICANT CHANGE UP (ref 96–108)
CHLORIDE SERPL-SCNC: 98 MMOL/L — SIGNIFICANT CHANGE UP (ref 96–108)
CO2 SERPL-SCNC: 25 MMOL/L — SIGNIFICANT CHANGE UP (ref 22–31)
CO2 SERPL-SCNC: 26 MMOL/L — SIGNIFICANT CHANGE UP (ref 22–31)
CREAT SERPL-MCNC: 1.39 MG/DL — HIGH (ref 0.5–1.3)
CREAT SERPL-MCNC: 1.54 MG/DL — HIGH (ref 0.5–1.3)
EOSINOPHIL # BLD AUTO: 0 K/UL — SIGNIFICANT CHANGE UP (ref 0–0.5)
EOSINOPHIL # BLD AUTO: 0 K/UL — SIGNIFICANT CHANGE UP (ref 0–0.5)
EOSINOPHIL NFR BLD AUTO: 0 % — SIGNIFICANT CHANGE UP (ref 0–6)
EOSINOPHIL NFR BLD AUTO: 0 % — SIGNIFICANT CHANGE UP (ref 0–6)
GLUCOSE BLDC GLUCOMTR-MCNC: 128 MG/DL — HIGH (ref 70–99)
GLUCOSE BLDC GLUCOMTR-MCNC: 82 MG/DL — SIGNIFICANT CHANGE UP (ref 70–99)
GLUCOSE BLDC GLUCOMTR-MCNC: 86 MG/DL — SIGNIFICANT CHANGE UP (ref 70–99)
GLUCOSE SERPL-MCNC: 198 MG/DL — HIGH (ref 70–99)
GLUCOSE SERPL-MCNC: 88 MG/DL — SIGNIFICANT CHANGE UP (ref 70–99)
HAPTOGLOB SERPL-MCNC: <20 MG/DL — LOW (ref 34–200)
HCT VFR BLD CALC: 22.1 % — LOW (ref 39–50)
HGB BLD-MCNC: 7.1 G/DL — LOW (ref 13–17)
IMM GRANULOCYTES NFR BLD AUTO: 0.7 % — SIGNIFICANT CHANGE UP (ref 0–1.5)
INR BLD: 3.75 RATIO — HIGH (ref 0.88–1.16)
LDH SERPL L TO P-CCNC: 495 U/L — HIGH (ref 50–242)
LYMPHOCYTES # BLD AUTO: 0.07 K/UL — LOW (ref 1–3.3)
LYMPHOCYTES # BLD AUTO: 0.16 K/UL — LOW (ref 1–3.3)
LYMPHOCYTES # BLD AUTO: 1.8 % — LOW (ref 13–44)
LYMPHOCYTES # BLD AUTO: 3.9 % — LOW (ref 13–44)
MAGNESIUM SERPL-MCNC: 2.1 MG/DL — SIGNIFICANT CHANGE UP (ref 1.6–2.6)
MAGNESIUM SERPL-MCNC: 2.2 MG/DL — SIGNIFICANT CHANGE UP (ref 1.6–2.6)
MCHC RBC-ENTMCNC: 28.9 PG — SIGNIFICANT CHANGE UP (ref 27–34)
MCHC RBC-ENTMCNC: 32.1 GM/DL — SIGNIFICANT CHANGE UP (ref 32–36)
MCV RBC AUTO: 89.8 FL — SIGNIFICANT CHANGE UP (ref 80–100)
MITOCHONDRIA AB SER-ACNC: SIGNIFICANT CHANGE UP
MONOCYTES # BLD AUTO: 0.19 K/UL — SIGNIFICANT CHANGE UP (ref 0–0.9)
MONOCYTES # BLD AUTO: 0.4 K/UL — SIGNIFICANT CHANGE UP (ref 0–0.9)
MONOCYTES NFR BLD AUTO: 5.3 % — SIGNIFICANT CHANGE UP (ref 2–14)
MONOCYTES NFR BLD AUTO: 9.7 % — SIGNIFICANT CHANGE UP (ref 2–14)
NEUTROPHILS # BLD AUTO: 3.37 K/UL — SIGNIFICANT CHANGE UP (ref 1.8–7.4)
NEUTROPHILS # BLD AUTO: 3.53 K/UL — SIGNIFICANT CHANGE UP (ref 1.8–7.4)
NEUTROPHILS NFR BLD AUTO: 85.7 % — HIGH (ref 43–77)
NEUTROPHILS NFR BLD AUTO: 92.9 % — HIGH (ref 43–77)
NRBC # BLD: 1 /100 WBCS — HIGH (ref 0–0)
PHOSPHATE SERPL-MCNC: 3.6 MG/DL — SIGNIFICANT CHANGE UP (ref 2.5–4.5)
PHOSPHATE SERPL-MCNC: 4.4 MG/DL — SIGNIFICANT CHANGE UP (ref 2.5–4.5)
PLATELET # BLD AUTO: 23 K/UL — LOW (ref 150–400)
POTASSIUM SERPL-MCNC: 5 MMOL/L — SIGNIFICANT CHANGE UP (ref 3.5–5.3)
POTASSIUM SERPL-MCNC: 5.6 MMOL/L — HIGH (ref 3.5–5.3)
POTASSIUM SERPL-SCNC: 5 MMOL/L — SIGNIFICANT CHANGE UP (ref 3.5–5.3)
POTASSIUM SERPL-SCNC: 5.6 MMOL/L — HIGH (ref 3.5–5.3)
PROT SERPL-MCNC: 3.9 G/DL — LOW (ref 6–8.3)
PROT SERPL-MCNC: 3.9 G/DL — LOW (ref 6–8.3)
PROTHROM AB SERPL-ACNC: 44.2 SEC — HIGH (ref 10–13.1)
RBC # BLD: 2.46 M/UL — LOW (ref 4.2–5.8)
RBC # FLD: 22.2 % — HIGH (ref 10.3–14.5)
SMOOTH MUSCLE AB SER-ACNC: ABNORMAL
SODIUM SERPL-SCNC: 133 MMOL/L — LOW (ref 135–145)
SODIUM SERPL-SCNC: 137 MMOL/L — SIGNIFICANT CHANGE UP (ref 135–145)
WBC # BLD: 4.12 K/UL — SIGNIFICANT CHANGE UP (ref 3.8–10.5)
WBC # FLD AUTO: 4.12 K/UL — SIGNIFICANT CHANGE UP (ref 3.8–10.5)

## 2019-11-07 PROCEDURE — 99497 ADVNCD CARE PLAN 30 MIN: CPT | Mod: 25

## 2019-11-07 PROCEDURE — 99233 SBSQ HOSP IP/OBS HIGH 50: CPT | Mod: GC

## 2019-11-07 PROCEDURE — 99231 SBSQ HOSP IP/OBS SF/LOW 25: CPT | Mod: GC

## 2019-11-07 PROCEDURE — 99223 1ST HOSP IP/OBS HIGH 75: CPT

## 2019-11-07 PROCEDURE — 70450 CT HEAD/BRAIN W/O DYE: CPT | Mod: 26

## 2019-11-07 PROCEDURE — 99233 SBSQ HOSP IP/OBS HIGH 50: CPT

## 2019-11-07 RX ORDER — DEXTROSE 50 % IN WATER 50 %
25 SYRINGE (ML) INTRAVENOUS ONCE
Refills: 0 | Status: COMPLETED | OUTPATIENT
Start: 2019-11-07 | End: 2019-11-07

## 2019-11-07 RX ORDER — CALCIUM GLUCONATE 100 MG/ML
1 VIAL (ML) INTRAVENOUS ONCE
Refills: 0 | Status: COMPLETED | OUTPATIENT
Start: 2019-11-07 | End: 2019-11-07

## 2019-11-07 RX ORDER — SODIUM CHLORIDE 9 MG/ML
250 INJECTION INTRAMUSCULAR; INTRAVENOUS; SUBCUTANEOUS ONCE
Refills: 0 | Status: COMPLETED | OUTPATIENT
Start: 2019-11-07 | End: 2019-11-07

## 2019-11-07 RX ORDER — FUROSEMIDE 40 MG
20 TABLET ORAL ONCE
Refills: 0 | Status: DISCONTINUED | OUTPATIENT
Start: 2019-11-07 | End: 2019-11-07

## 2019-11-07 RX ORDER — INSULIN HUMAN 100 [IU]/ML
5 INJECTION, SOLUTION SUBCUTANEOUS ONCE
Refills: 0 | Status: COMPLETED | OUTPATIENT
Start: 2019-11-07 | End: 2019-11-07

## 2019-11-07 RX ORDER — ALBUTEROL 90 UG/1
10 AEROSOL, METERED ORAL ONCE
Refills: 0 | Status: COMPLETED | OUTPATIENT
Start: 2019-11-07 | End: 2019-11-07

## 2019-11-07 RX ORDER — INSULIN HUMAN 100 [IU]/ML
5 INJECTION, SOLUTION SUBCUTANEOUS ONCE
Refills: 0 | Status: DISCONTINUED | OUTPATIENT
Start: 2019-11-07 | End: 2019-11-07

## 2019-11-07 RX ORDER — FUROSEMIDE 40 MG
60 TABLET ORAL EVERY 8 HOURS
Refills: 0 | Status: DISCONTINUED | OUTPATIENT
Start: 2019-11-07 | End: 2019-11-07

## 2019-11-07 RX ADMIN — Medication 60 MILLIGRAM(S): at 13:42

## 2019-11-07 RX ADMIN — PANTOPRAZOLE SODIUM 40 MILLIGRAM(S): 20 TABLET, DELAYED RELEASE ORAL at 06:01

## 2019-11-07 RX ADMIN — Medication 40 MILLIGRAM(S): at 06:40

## 2019-11-07 RX ADMIN — Medication 5000 UNIT(S): at 13:04

## 2019-11-07 RX ADMIN — INSULIN HUMAN 5 UNIT(S): 100 INJECTION, SOLUTION SUBCUTANEOUS at 18:29

## 2019-11-07 RX ADMIN — FINASTERIDE 5 MILLIGRAM(S): 5 TABLET, FILM COATED ORAL at 13:02

## 2019-11-07 RX ADMIN — ALBUTEROL 10 MILLIGRAM(S): 90 AEROSOL, METERED ORAL at 18:28

## 2019-11-07 RX ADMIN — Medication 650 MILLIGRAM(S): at 20:20

## 2019-11-07 RX ADMIN — Medication 25 MILLILITER(S): at 18:29

## 2019-11-07 RX ADMIN — Medication 200 GRAM(S): at 18:28

## 2019-11-07 RX ADMIN — Medication 0.5 MILLIGRAM(S): at 13:03

## 2019-11-07 RX ADMIN — Medication 650 MILLIGRAM(S): at 22:17

## 2019-11-07 RX ADMIN — PANTOPRAZOLE SODIUM 40 MILLIGRAM(S): 20 TABLET, DELAYED RELEASE ORAL at 18:29

## 2019-11-07 RX ADMIN — CEFTRIAXONE 100 MILLIGRAM(S): 500 INJECTION, POWDER, FOR SOLUTION INTRAMUSCULAR; INTRAVENOUS at 06:41

## 2019-11-07 RX ADMIN — SODIUM CHLORIDE 500 MILLILITER(S): 9 INJECTION INTRAMUSCULAR; INTRAVENOUS; SUBCUTANEOUS at 17:29

## 2019-11-07 RX ADMIN — Medication 100 MILLIGRAM(S): at 13:02

## 2019-11-07 NOTE — PROGRESS NOTE ADULT - SUBJECTIVE AND OBJECTIVE BOX
Subjective: Patient appeared more lethargic today compared to yesterday. He appropriately responds to questions but subsequently falls back asleep.    Medications:  acetaminophen   Tablet .. 650 milliGRAM(s) Oral every 6 hours PRN  allopurinol 100 milliGRAM(s) Oral daily  Biotene Dry Mouth Oral Rinse 5 milliLiter(s) Swish and Spit three times a day PRN  cefTRIAXone   IVPB 1000 milliGRAM(s) IV Intermittent every 24 hours  cholecalciferol 5000 Unit(s) Oral daily  cyanocobalamin 100 MICROGram(s) 100 MICROGram(s) Oral daily  finasteride 5 milliGRAM(s) Oral daily  folic acid 0.5 milliGRAM(s) Oral daily  furosemide   Injectable 60 milliGRAM(s) IV Push every 8 hours  furosemide   Injectable 20 milliGRAM(s) IV Push once PRN  pantoprazole  Injectable 40 milliGRAM(s) IV Push two times a day    Vital Signs Last 24 Hrs  T(C): 36.8 (07 Nov 2019 13:30), Max: 36.8 (07 Nov 2019 13:30)  T(F): 98.2 (07 Nov 2019 13:30), Max: 98.2 (07 Nov 2019 13:30)  HR: 82 (07 Nov 2019 13:30) (80 - 96)  BP: 91/54 (07 Nov 2019 13:30) (87/51 - 109/68)  BP(mean): --  RR: 20 (07 Nov 2019 13:30) (18 - 20)  SpO2: 98% (07 Nov 2019 13:30) (93% - 98%)    PHYSICAL EXAM:  GENERAL: frail, somnolent elderly male  HEAD:  Atraumatic, Normocephalic  EYES: EOMI, conjunctiva and sclera clear  NECK: Supple, JVP elevated to 9  CHEST/LUNG: decreased breath sounds b/l; No wheeze, ronchi or rales  HEART: Regular rate and rhythm; No murmurs, rubs, or gallops  ABDOMEN: Soft, Nontender, distended; Bowel sounds present  EXTREMITIES:  2+ Peripheral Pulses, No clubbing, cyanosis, or edema.   NEUROLOGY: non-focal  SKIN: stasis dermatitis on b/l shins    I&O's Summary    06 Nov 2019 07:01  -  07 Nov 2019 07:00  --------------------------------------------------------  IN: 665 mL / OUT: 200 mL / NET: 465 mL      Labs:                        7.1    4.12  )-----------( 23       ( 07 Nov 2019 10:23 )             22.1     11-06    132<L>  |  94<L>  |  60<H>  ----------------------------<  91  5.1   |  27  |  1.00    Ca    10.7<H>      06 Nov 2019 21:36  Phos  3.9     11-06  Mg     2.1     11-06    TPro  4.8<L>  /  Alb  2.4<L>  /  TBili  3.5<H>  /  DBili  x   /  AST  185<H>  /  ALT  52<H>  /  AlkPhos  530<H>  11-06    PT/INR - ( 07 Nov 2019 10:10 )   PT: 44.2 sec;   INR: 3.75 ratio         PTT - ( 07 Nov 2019 10:10 )  PTT:52.0 sec      Serum Pro-Brain Natriuretic Peptide: 2019 pg/mL (11-04 @ 06:30)      Lactate Dehydrogenase, Serum: 495 U/L (11-07 @ 08:21) Subjective: Patient appeared more lethargic today compared to yesterday. He appropriately responds to questions but subsequently falls back asleep. Patient also had 8 beats of wide complex tachycardia overnight.    Medications:  acetaminophen   Tablet .. 650 milliGRAM(s) Oral every 6 hours PRN  allopurinol 100 milliGRAM(s) Oral daily  Biotene Dry Mouth Oral Rinse 5 milliLiter(s) Swish and Spit three times a day PRN  cefTRIAXone   IVPB 1000 milliGRAM(s) IV Intermittent every 24 hours  cholecalciferol 5000 Unit(s) Oral daily  cyanocobalamin 100 MICROGram(s) 100 MICROGram(s) Oral daily  finasteride 5 milliGRAM(s) Oral daily  folic acid 0.5 milliGRAM(s) Oral daily  furosemide   Injectable 60 milliGRAM(s) IV Push every 8 hours  furosemide   Injectable 20 milliGRAM(s) IV Push once PRN  pantoprazole  Injectable 40 milliGRAM(s) IV Push two times a day    Vital Signs Last 24 Hrs  T(C): 36.8 (07 Nov 2019 13:30), Max: 36.8 (07 Nov 2019 13:30)  T(F): 98.2 (07 Nov 2019 13:30), Max: 98.2 (07 Nov 2019 13:30)  HR: 82 (07 Nov 2019 13:30) (80 - 96)  BP: 91/54 (07 Nov 2019 13:30) (87/51 - 109/68)  BP(mean): --  RR: 20 (07 Nov 2019 13:30) (18 - 20)  SpO2: 98% (07 Nov 2019 13:30) (93% - 98%)    PHYSICAL EXAM:  GENERAL: frail, somnolent elderly male  HEAD:  Atraumatic, Normocephalic  EYES: EOMI, conjunctiva and sclera clear  NECK: Supple, JVP elevated to 9  CHEST/LUNG: decreased breath sounds b/l; No wheeze, ronchi or rales  HEART: Regular rate and rhythm; No murmurs, rubs, or gallops  ABDOMEN: Soft, Nontender, distended; Bowel sounds present  EXTREMITIES:  2+ Peripheral Pulses, No clubbing, cyanosis, or edema.   NEUROLOGY: non-focal  SKIN: stasis dermatitis on b/l shins    I&O's Summary    06 Nov 2019 07:01  -  07 Nov 2019 07:00  --------------------------------------------------------  IN: 665 mL / OUT: 200 mL / NET: 465 mL      Labs:                        7.1    4.12  )-----------( 23       ( 07 Nov 2019 10:23 )             22.1     11-06    132<L>  |  94<L>  |  60<H>  ----------------------------<  91  5.1   |  27  |  1.00    Ca    10.7<H>      06 Nov 2019 21:36  Phos  3.9     11-06  Mg     2.1     11-06    TPro  4.8<L>  /  Alb  2.4<L>  /  TBili  3.5<H>  /  DBili  x   /  AST  185<H>  /  ALT  52<H>  /  AlkPhos  530<H>  11-06    PT/INR - ( 07 Nov 2019 10:10 )   PT: 44.2 sec;   INR: 3.75 ratio         PTT - ( 07 Nov 2019 10:10 )  PTT:52.0 sec      Serum Pro-Brain Natriuretic Peptide: 2019 pg/mL (11-04 @ 06:30)      Lactate Dehydrogenase, Serum: 495 U/L (11-07 @ 08:21)

## 2019-11-07 NOTE — PROGRESS NOTE ADULT - SUBJECTIVE AND OBJECTIVE BOX
JOSUE SOLOMON is a 86year old man who has been here for 4day(s).    ***ONE-LINER***  ALAN is a 86 year old man with a history of chronic systolic heart failure with recent upgrade to BiV, valvular heart disease (mechanical aortic, 1999; MV repair, 2006), on coumadin (INR goal 2.5 to 3), CAD with CABG and stents (BMSx2, LAD), chronic Afib, thrombocytopenia, gout, HLD, HTN, BPH. He is presenting here for increased dyspnea on exertion with bilateral pleural effusions, abdominal ascites, and peripheral edema in the context of a history of chronic heart failure, valvular heart disease, and CAD. This is most consistent with an acute exacerbation of his chronic systolic heart failure and is being worked up for cardiac cirrhosis, pancytopenia, and possible occult GI bleed.     ***INTERVAL HPI/OVERNIGHT EVENTS***  Yesterday, the patient received 1/2U of blood. The patient did not report any adverse effects. He had two runs of non-sustained wide-complex ventricular tachycardia and received 12.5mg of lopressor. He had difficulty sleeping last night, reportedly staying up until 4am. This morning, the patient stated that he felt very tired, but generally well. He was difficult to keep awake so the interview and ROS was limited. The patient's family was quite concerned with his prognosis and they report that most of his family is trying to get to [facility] to see him. They seemed very interested in a palliative care consult.    T(F): 97.2, Max: 98.1 (14:35)  HR: 80 (80 - 100)  BP: 101/62 (87/51 - 120/63)  RR: 18 (18 - 20)  SpO2: 98% (93% - 98%)    ***PHYSICAL EXAM***  GENERAL: NAD, lying in bed. Difficult to rouse. Well groomed.  HEAD:  Atraumatic, Normocephalic  EYES: EOMI, PERRLA, conjunctiva and sclera clear  ENT: Moist mucous membranes; patent nares, uvula midline  NECK: Supple, No JVD, no lymphadenopathy  CHEST/LUNG: Clear to auscultation bilaterally; No rales, rhonchi, wheezing, or rubs. Unlabored respirations  HEART: Regular rate and rhythm; No murmurs, rubs, or gallops. Click of mechanical valve.   ABDOMEN: Distended, with positive fluid wave. Bowel sounds present; Soft, Nontender.   EXTREMITIES:  No pedal edema. Peripheral Pulses, brisk capillary refill. No clubbing or cyanosis.  NERVOUS SYSTEM:  Alert & Oriented X3, speech clear. No deficits. CNII-XII grossly intact.  MSK: FROM all 4 extremities, full and equal strength  SKIN: Slight stasis rash on ankles and shins bilaterally. No other lesions      11-06-19 @ 07:01  -  11-07-19 @ 07:00  --------------------------------------------------------  IN: 665 mL / OUT: 200 mL / NET: 465 mL      ***LABORATORY STUDIES***                        7.1    4.12  )-----------( 23 ( 07 Nov 2019 10:23 )             22.1     11-06    132<L>  |  94<L>  |  60<H>  -------------------------------------------------------------<  91  5.1            |  27            |  1.00    Ca    10.7<H>      06 Nov 2019 21:36  Phos  3.9     11-06  Mg     2.1     11-06    TPro  4.8<L>  /  Alb  2.4<L>  /  TBili  3.5<H>  /  DBili  x   /  AST  185<H>  /  ALT  52<H>  /  AlkPhos  530<H>  11-06    (11-07)  44.2<H>  -----------------< 3.75<H>  52.0<H>    Lactate Dehydrogenase, Serum: 495 U/L (11.07.19 @ 08:21)    Haptoglobin, Serum: <20: Test Repeated mg/dL (11.07.19 @ 10:10)    ***IMAGING***    ***MEDICATIONS***  acetaminophen   Tablet .. 650 milliGRAM(s) Oral every 6 hours PRN  allopurinol 100 milliGRAM(s) Oral daily  Biotene Dry Mouth Oral Rinse 5 milliLiter(s) Swish and Spit three times a day PRN  cefTRIAXone   IVPB 1000 milliGRAM(s) IV Intermittent every 24 hours  cholecalciferol 5000 Unit(s) Oral daily  cyanocobalamin 100 MICROGram(s) 100 MICROGram(s) Oral daily  finasteride 5 milliGRAM(s) Oral daily  folic acid 0.5 milliGRAM(s) Oral daily  furosemide   Injectable 60 milliGRAM(s) IV Push every 8 hours  furosemide   Injectable 20 milliGRAM(s) IV Push once PRN  pantoprazole  Injectable 40 milliGRAM(s) IV Push two times a day    HEALTH ISSUES - PROBLEM Dx:  Decompensated heart failure: Decompensated heart failure  Preventive measure: Preventive measure  BPH (benign prostatic hyperplasia): BPH (benign prostatic hyperplasia)  Dyslipidemia: Dyslipidemia  Atrial fibrillation: Atrial fibrillation  Valvular heart disease: Valvular heart disease  CAD (coronary artery disease): CAD (coronary artery disease)  HTN (hypertension): HTN (hypertension)  Fecal occult blood test positive: Fecal occult blood test positive  Cardiac cirrhosis: Cardiac cirrhosis  Acute on chronic combined systolic and diastolic congestive heart failure: Acute on chronic combined systolic and diastolic congestive heart failure

## 2019-11-07 NOTE — CONSULT NOTE ADULT - SUBJECTIVE AND OBJECTIVE BOX
HPI:  85 YO M PMH of chronic systolic heart failure with PPM and recent upgrade to BiV, valvular heart disease (s/p mechanical aortic valve replacement in 1999 and MV repair in 2006) on coumadin INR goal 2.5 to 3, CAD s/p CABG/Stent BMS x 2 to LAD chronic atrial fibrillation, gout, HLD, HTN, BPH, presenting with dyspnea on exertion. Patient states he started developing shortness of breath a couple of days ago worse with exertion (walking). He says that at baseline he is able to walk 2,000-3,000 steps daily without limitations. Sleeps with 1 pillow at night as well as 1 pillow under his legs. Reports recent dry cough but no chest pain. Also reports abdominal bloating recently. No fevers, chills, recent illness, or travel history. Reports good compliance with his medications. Recently saw his Cardiologist who switched Lasix to Torsemide. He says he has felt weak and tired overall since his PPM was upgraded to a BiV device in September. He denies any history of bleeding disorders or Liver disease but does report having darker than usual stools recently. Had a colonoscopy done years ago which was normal.    97.8 F, HR 75, BP 92/54, RR 20, SpO2 99% on 4L of NC. Was first on Bipap initially.  Given 80mg IVP Lasix x1 (03 Nov 2019 22:47)    PERTINENT PM/SXH:   HTN (hypertension)  Dyslipidemia  CHF (congestive heart failure) EF = 49%  CAD (coronary atherosclerotic disease) s/p CABG/Stent BMS x 2 to LAD  BPH (benign prostatic hyperplasia)  Atrial fibrillation    S/P rotator cuff surgery  s/p hernia repair x 2  S/P coronary artery stent placement x 2 (BMS to LAD)  S/P CABG x 2  S/P mitral valve repair (2006)  s/p mechanical aortic valve replacement (1999)    FAMILY HISTORY:  No pertinent family history in first degree relatives    ITEMS NOT CHECKED ARE NOT PRESENT    SOCIAL HISTORY:   Significant other/partner:  [x ]  Children: 3 [ ]  Holiness/Spirituality: Alevism  Substance hx:  [ ]   Tobacco hx:  [ ]   Alcohol hx: [ ]   Home Opioid hx:  [ ] I-Stop Reference No:  Living Situation: [x ]Home  [ ]Long term care  [ ]Rehab [ ]Other    ADVANCE DIRECTIVES:    DNR    MOLST  [ ]    Living Will  [x ]   DECISION MAKER(s):  [ ] Health Care Proxy(s)  [ ] Surrogate(s)  [ ] Guardian           Name(s): Phone Number(s):iKrstin Blake, # [phone], son Marielos, dtr  Gila, son Juan J    BASELINE (I)ADL(s) (prior to admission):  [place]: [ ]Total  [ ] Moderate [ ]Dependent    Allergies  No Known Allergies    Intolerances    MEDICATIONS  (STANDING):  allopurinol 100 milliGRAM(s) Oral daily  cefTRIAXone   IVPB 1000 milliGRAM(s) IV Intermittent every 24 hours  cholecalciferol 5000 Unit(s) Oral daily  cyanocobalamin 100 MICROGram(s) 100 MICROGram(s) Oral daily  finasteride 5 milliGRAM(s) Oral daily  folic acid 0.5 milliGRAM(s) Oral daily  pantoprazole  Injectable 40 milliGRAM(s) IV Push two times a day  sodium chloride 0.9% Bolus 250 milliLiter(s) IV Bolus once    MEDICATIONS  (PRN):  acetaminophen   Tablet .. 650 milliGRAM(s) Oral every 6 hours PRN Mild Pain (1 - 3), Moderate Pain (4 - 6)  Biotene Dry Mouth Oral Rinse 5 milliLiter(s) Swish and Spit three times a day PRN Dry mouth    PRESENT SYMPTOMS: [x ]Unable to obtain due to poor mentation   Source if other than patient:  [x ]Family   [ x]Team     Pain: [ ] yes [x ] no  QOL impact -   Location -                    Aggravating factors -  Quality -  Radiation -  Timing-  Severity (0-10 scale):  Minimal acceptable level (0-10 scale):     PAIN AD Score:     [email or web address] (press ctrl +  left click to view)    Dyspnea:                           [x ]Mild [ ]Moderate [ ]Severe  Anxiety:                             [x ]Mild [ ]Moderate [ ]Severe  Fatigue:                             [ ]Mild [ ]Moderate [x ]Severe  Nausea:                             [ ]Mild [ ]Moderate [ ]Severe  Loss of appetite:              [ ]Mild [ ]Moderate [ x]Severe  Constipation:                    [ ]Mild [ ]Moderate [ ]Severe    Other Symptoms:  [ ]All other review of systems negative     Karnofsky Performance Score/Palliative Performance Status Version 2:     30    %    http://npcrc.org/files/news/palliative_performance_scale_ppsv2.pdf    PHYSICAL EXAM:  Vital Signs Last 24 Hrs  T(C): 36.8 (07 Nov 2019 13:30), Max: 36.8 (07 Nov 2019 13:30)  T(F): 98.2 (07 Nov 2019 13:30), Max: 98.2 (07 Nov 2019 13:30)  HR: 82 (07 Nov 2019 13:30) (80 - 95)  BP: 91/54 (07 Nov 2019 13:30) (87/51 - 109/68)  BP(mean): --  RR: 20 (07 Nov 2019 13:30) (18 - 20)  SpO2: 98% (07 Nov 2019 13:30) (93% - 98%) I&O's Summary    06 Nov 2019 07:01  -  07 Nov 2019 07:00  --------------------------------------------------------  IN: 665 mL / OUT: 200 mL / NET: 465 mL    GENERAL:  [ ]Alert  [ ]Oriented x   [x ]Lethargic  [x ]Cachexia  [ ]Unarousable  [ ]minimally Verbal  [ ]Non-Verbal    Behavioral:   [ ] Anxiety  [ ] Delirium [ ] Agitation [ ] Other    HEENT:  [ ]Normal   [x ]Dry mouth   [ ]ET Tube/Trach  [ ]Oral lesions    PULMONARY:   [ ]Clear [ ]Tachypnea  [ ]Audible excessive secretions   [ ]Rhonchi        [ ]Right [ ]Left [ ]Bilateral  [x ]Crackles        [ ]Right [ ]Left [ ]Bilateral  [ ]Wheezing     [ ]Right [ ]Left [ ]Bilateral    CARDIOVASCULAR:    [x ]Regular [ ]Irregular [ ]Tachy  [ ]Marcos [ ]Murmur [ ]Other    GASTROINTESTINAL:  [x ]Soft  [x ]Distended   [ x]+BS  [ ]Non tender [ ]Tender  [ ]PEG [ ]OGT/ NGT  Last BM:     GENITOURINARY:  [x ]Normal [ ] Incontinent   [ ]Oliguria/Anuria   [ ]Cutler    MUSCULOSKELETAL:   [ ]Normal   [x ]Weakness  [x ]Bed/Wheelchair bound [ ]Edema    NEUROLOGIC:   [x ]No focal deficits  [ ] Cognitive impairment  [ ] Dysphagia [ ]Dysarthria [ ] Paresis [ ]Other     SKIN:   [x ]Normal   [ ]Pressure ulcer(s)  [ ]Rash    CRITICAL CARE:  [ ] Shock Present  [ ] Septic [ ]Cardiogenic [ ]Neurologic [ ]Hypovolemic  [ ]  Vasopressors [ ]  Inotropes   [ ] Respiratory failure present [ ] mechanical ventilation [ ] non-invasive ventilatory support [ ] High flow  [ ] Acute  [ ] Chronic [ ] Hypoxic  [ ] Hypercarbic [ ] Other  [x ] Other organ failure -  liver    LABS:                        7.1    4.12  )-----------( 23       ( 07 Nov 2019 10:23 )             22.1   11-07    133<L>  |  96  |  80<H>  ----------------------------<  198<H>  5.6<H>   |  25  |  1.39<H>    Ca    10.3      07 Nov 2019 14:47  Phos  4.4     11-07  Mg     2.1     11-07    TPro  3.9<L>  /  Alb  1.8<L>  /  TBili  3.1<H>  /  DBili  x   /  AST  142<H>  /  ALT  43  /  AlkPhos  414<H>  11-07  PT/INR - ( 07 Nov 2019 10:10 )   PT: 44.2 sec;   INR: 3.75 ratio         PTT - ( 07 Nov 2019 10:10 )  PTT:52.0 sec      RADIOLOGY & ADDITIONAL STUDIES:    PROTEIN CALORIE MALNUTRITION PRESENT: [x ] Yes [ ] No  [x ] PPSV2 < or = to 30% [ ] significant weight loss  x[ ] poor nutritional intake [ ] catabolic state [ ] anasarca     Albumin, Serum: 1.8 g/dL (11-07-19 @ 14:47)  Artificial Nutrition [ ]     REFERRALS:   [ ]Chaplaincy  [ ] Hospice  [ ]Child Life  [ ]Social Work  [ ]Case management [ ]Holistic Therapy     Goals of Care Document:   Care Coordination Assessment [C. Provider] (11-04-19 @ 13:59)   Progress Notes - Care Coordination [C. Provider] (11-04-19 @ 16:19)

## 2019-11-07 NOTE — PROGRESS NOTE ADULT - ASSESSMENT
ALAN is a 86 year old man with a history of chronic systolic heart failure with recent upgrade to BiV, valvular heart disease (mechanical aortic, 1999; MV repair, 2006), on coumadin (INR goal 2.5 to 3), CAD with CABG and stents (BMSx2, LAD), chronic Afib, thrombocytopenia, gout, HLD, HTN, BPH.    He is presenting here for increased dyspnea on exertion with bilateral pleural effusions, abdominal ascites, and peripheral edema in the context of a history of chronic heart failure, valvular heart disease, and CAD. This is most consistent with an acute exacerbation of his chronic systolic heart failure (class IV, stage D). The patient has CT and laboratory evidence of cirrhosis, likely secondary to his CHF exacerbation, though autoimmune, metabolic, and infectious etiologies are being pursued. Given the steady drop in hemoglobin, the persistent weakness, and the dark stools, this patient is being worked up for a GI bleed; the stool color has been intermittently light and dark and the hemoglobin levels have continued to fluctuate between 7 and 8. The patient was transfused 1U on 11/5 for Hb < 8 which was complicated by shortness of breath and cough following transfusion. Another transfusion of 1/2U was given 11/6 for Hb of 7.9 and the patient responded appropriately. He is scheduled for 1U total transfusion on 11/7 for Hb of 7.1. The patient has ongoing IV diuresis titration. Invasive procedures are being held at this point due to a low platelet count and elevated INR (goal is <2). INR stable at 3.2-3.3 despite no warfarin since 10/29. Patient scheduled for palliative care consult for goals of care.

## 2019-11-07 NOTE — PROGRESS NOTE ADULT - SUBJECTIVE AND OBJECTIVE BOX
INTERVAL EVENTS:  no acute events overnight. Pt still coughing. SOB with exertion.    Vital Signs Last 24 Hrs  T(C): 36.7 (07 Nov 2019 03:52), Max: 36.7 (06 Nov 2019 14:35)  T(F): 98.1 (07 Nov 2019 03:52), Max: 98.1 (06 Nov 2019 14:35)  HR: 80 (07 Nov 2019 03:52) (80 - 100)  BP: 88/58 (07 Nov 2019 06:14) (87/51 - 120/63)  BP(mean): --  RR: 18 (07 Nov 2019 04:02) (18 - 20)  SpO2: 98% (07 Nov 2019 03:52) (93% - 98%)      PHYSICAL EXAM:   GENERAL: no acute distress  HEENT: EOMI, neck supple  RESPIRATORY: decreased lung sound RLL  CARDIOVASCULAR: RRR, no murmurs, gallops, rubs  ABDOMINAL: soft, non-tender, non-distended, positive bowel sounds   EXTREMITIES: no clubbing, cyanosis, or edema  NEUROLOGICAL: alert and oriented x 3, non-focal  SKIN: no rashes or lesions   MUSCULOSKELETAL: no gross joint deformity                          8.2    3.63  )-----------( 27       ( 06 Nov 2019 20:05 )             25.8     11-06    132<L>  |  94<L>  |  60<H>  ----------------------------<  91  5.1   |  27  |  1.00    Ca    10.7<H>      06 Nov 2019 21:36  Phos  3.9     11-06  Mg     2.1     11-06    TPro  4.8<L>  /  Alb  2.4<L>  /  TBili  3.5<H>  /  DBili  x   /  AST  185<H>  /  ALT  52<H>  /  AlkPhos  530<H>  11-06    PT/INR - ( 06 Nov 2019 08:49 )   PT: 38.3 sec;   INR: 3.24 ratio         PTT - ( 06 Nov 2019 08:49 )  PTT:51.8 sec  < from: CT Abdomen and Pelvis No Cont (11.03.19 @ 16:48) >  IMPRESSION:     No retroperitoneal hematoma, as clinically questioned.    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    Marked cardiomegaly with congestive heart failure.    Interval worsening of moderate volume ascites.    Moderate right pleural effusion with compressive atelectasis.    < end of copied text >    MEDICATIONS  (STANDING):  allopurinol 100 milliGRAM(s) Oral daily  cefTRIAXone   IVPB 1000 milliGRAM(s) IV Intermittent every 24 hours  cholecalciferol 5000 Unit(s) Oral daily  cyanocobalamin 100 MICROGram(s) 100 MICROGram(s) Oral daily  finasteride 5 milliGRAM(s) Oral daily  folic acid 0.5 milliGRAM(s) Oral daily  furosemide   Injectable 60 milliGRAM(s) IV Push every 8 hours  pantoprazole  Injectable 40 milliGRAM(s) IV Push two times a day INTERVAL EVENTS:  no acute events overnight. Pt still coughing. SOB with exertion.    Vital Signs Last 24 Hrs  T(C): 36.7 (07 Nov 2019 03:52), Max: 36.7 (06 Nov 2019 14:35)  T(F): 98.1 (07 Nov 2019 03:52), Max: 98.1 (06 Nov 2019 14:35)  HR: 80 (07 Nov 2019 03:52) (80 - 100)  BP: 88/58 (07 Nov 2019 06:14) (87/51 - 120/63)  BP(mean): --  RR: 18 (07 Nov 2019 04:02) (18 - 20)  SpO2: 98% (07 Nov 2019 03:52) (93% - 98%)      PHYSICAL EXAM:   GENERAL: no acute distress  HEENT: EOMI, neck supple  RESPIRATORY: decreased lung sound RLL  CARDIOVASCULAR: RRR, no murmurs, gallops, rubs  ABDOMINAL: soft, non-tender, non-distended, positive bowel sounds   EXTREMITIES: no clubbing, cyanosis, or edema  NEUROLOGICAL: alert and oriented x 3, non-focal  SKIN: no rashes or lesions   MUSCULOSKELETAL: no gross joint deformity                          8.2    3.63  )-----------( 27       ( 06 Nov 2019 20:05 )             25.8     11-06    132<L>  |  94<L>  |  60<H>  ----------------------------<  91  5.1   |  27  |  1.00    Ca    10.7<H>      06 Nov 2019 21:36  Phos  3.9     11-06  Mg     2.1     11-06    TPro  4.8<L>  /  Alb  2.4<L>  /  TBili  3.5<H>  /  DBili  x   /  AST  185<H>  /  ALT  52<H>  /  AlkPhos  530<H>  11-06    PT/INR - ( 06 Nov 2019 08:49 )   PT: 38.3 sec;   INR: 3.24 ratio         PTT - ( 06 Nov 2019 08:49 )  PTT:51.8 sec  MEDICATIONS  (STANDING):  allopurinol 100 milliGRAM(s) Oral daily  cefTRIAXone   IVPB 1000 milliGRAM(s) IV Intermittent every 24 hours  cholecalciferol 5000 Unit(s) Oral daily  cyanocobalamin 100 MICROGram(s) 100 MICROGram(s) Oral daily  finasteride 5 milliGRAM(s) Oral daily  folic acid 0.5 milliGRAM(s) Oral daily  furosemide   Injectable 60 milliGRAM(s) IV Push every 8 hours  pantoprazole  Injectable 40 milliGRAM(s) IV Push two times a day    < from: CT Abdomen and Pelvis No Cont (11.03.19 @ 16:48) >  IMPRESSION:     No retroperitoneal hematoma, as clinically questioned.    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    Marked cardiomegaly with congestive heart failure.    Interval worsening of moderate volume ascites.    Moderate right pleural effusion with compressive atelectasis.    < end of copied text >

## 2019-11-07 NOTE — RAPID RESPONSE TEAM SUMMARY - NSOTHERINTERVENTIONSRRT_GEN_ALL_CORE
- cc bolus  -cardiology called  -BMP and ammonia  -Recommend CTH for AMS, which occurred prior to RRT and has been actively worked up for AMS   -c/w GOC

## 2019-11-07 NOTE — RAPID RESPONSE TEAM SUMMARY - NSADDTLFINDINGSRRT_GEN_ALL_CORE
Physical exam: responding to commands, AOx1 to person, Lungs clear, RRR, no pitting edema  Vitals: SBP 90s, BS 80s  -CXR from yesterday showing R pleural effusion

## 2019-11-07 NOTE — CONSULT NOTE ADULT - ASSESSMENT
85 YO M PMH of chronic systolic heart failure with PPM. p/w sob.  c/b MDS and cardiac cirrhosis.  palliative care called for GOC.

## 2019-11-07 NOTE — PROGRESS NOTE ADULT - ASSESSMENT
87 YO M PMH of chronic systolic heart failure with PPM and recent upgrade to BiV, valvular heart disease (s/p mechanical aortic valve replacement in 1999 and MV repair in 2006) on coumadin, CAD s/p CABG/Stent BMS x 2 to LAD, afib, gout, HLD, HTN, BPH, pw dyspnea on exertion being treated for CHF. Hematology consulted for pancytopenia.    # Pancytopenia  -peripheral smear (11/6) showed heavy basophilic stipling, sperocytosis, decreased plt, increased monocytes, normal appearing granulocytes.  -the smears findings are concerning for underlying sideroblastic anemia progressed into MDS  -discussed the findings with patient and family that we would need to perform a BM bx for diagnosis and possible treatment. Family is concerned about the procedure given pt's debilitated state as well as in the event that pt requires chemotherapy he may not be able to tolerate the treatment in his current condition. The patient and family would like Dr. Mireles (PCP) to be involve in the discussion.   -alternatively patient supportive care with transfusion of platelet and PRBC as needed is a reasonable option. In the event that pt recovers from currently CHF exacerbation and becomes stronger, further diagnostic workup may be pursued at that time.   -ongoing GOC discussion    Marty Castellano MD. PGY 5  Heme-Onc fellow  [phone]; [phone] 85 YO M PMH of chronic systolic heart failure with PPM and recent upgrade to BiV, valvular heart disease (s/p mechanical aortic valve replacement in 1999 and MV repair in 2006) on coumadin, CAD s/p CABG/Stent BMS x 2 to LAD, afib, gout, HLD, HTN, BPH, pw dyspnea on exertion being treated for CHF. Hematology consulted for pancytopenia.    # Pancytopenia  -peripheral smear (11/6) showed heavy basophilic stipling, sperocytosis, decreased plt, increased monocytes, normal appearing granulocytes.  -the smears findings are concerning for underlying sideroblastic anemia progressed into MDS  -discussed the findings with patient and family that we would need to perform a BM bx for diagnosis and possible treatment. Family is concerned about the procedure given pt's debilitated state as well as in the event that pt requires chemotherapy he may not be able to tolerate the treatment in his current condition. The patient and family would like Dr. Mireles (PCP) to be involve in the discussion.   -alternatively patient supportive care with transfusion of platelet and PRBC as needed is a reasonable option. In the event that pt recovers from currently CHF exacerbation and becomes stronger, further diagnostic workup may be pursued at that time.   -family agrees with conservative and supportive care also interested in getting palliative care evaluation as well.  -will hold off on further hematologic workup.      Marty Castellano MD. PGY 5  Heme-Onc fellow  [phone]; [phone]

## 2019-11-07 NOTE — PROGRESS NOTE ADULT - PROBLEM SELECTOR PLAN 1
Patient presenting with dyspnea on exertion with dry cough and abdominal bloating and found to have b/l pleural effusions likely 2/2 CHF exacerbation. Unclear precipitating factor however suspect dietary changes (i.e high salt). Has been taking medications as prescribed, no recent illness. On admission, required bipap 2/2 dyspnea however now on 4L of NC with SpO2 100%. S/p 80mg IVP Lasix in the ED and 40 BID on floor. Found to be hyponatremic to 127 on admission, repeat Na 132 s/p Lasix. Suspect hypervolemic hyponatremia. Bedside POCUS significant for B lines and large right sided pleural effusion. Chest x-ray showing loculated right pleural effusion. CT shows right pleural effusion. TTE shows EF of 50-60%, moderate mitral regurgitation, and bilateral atrial dilatation. Patient consistently hypotensive with systolics in the 90s. Improved pedal edema  - increase Lasix to 60mg TID to further remove fluid.  - give bolus of 60mg IV lasix prior to transfusions to mitigate the SOB and cough experienced in prior transfusion.   - follow Heart Failure team recs for inotrope initiation  - Monitor strict I&Os, goal net negative 2-3L daily  - encourage PO intake  - Daily weights

## 2019-11-07 NOTE — PROGRESS NOTE ADULT - ATTENDING COMMENTS
Met with family to discuss any additional questions along with Dr Castellano. Dr Castellano did contact Dr Mireles last night and also informed the family late last night and answered more of their questions. The plan is for comfort care.

## 2019-11-07 NOTE — PROGRESS NOTE ADULT - PROBLEM SELECTOR PLAN 1
Patient remains volume overloaded, which can partly explain his SOB. He's also anemic from acute blood loss (likely upper GI bleed).  - Agree with increasing lasix to 60 IV q8hrs.  - daily standing weights and BMP/Mg/Phos q12 hours.  - Strict I&Os  - Plan to increase BiV device to 80 to augment CO. Patient currently in Afib (HR in 80s).  - d/c Entresto and coreg given hypotension  - F/u GGT Patient remains volume overloaded, which can partly explain his SOB. He's also anemic from acute blood loss (likely upper GI bleed).  - Agree with increasing lasix to 60 IV q8hrs.  - daily standing weights and BMP/Mg/Phos q12 hours.  - Strict I&Os  - Plan to increase BiV device to 80 to augment CO. Patient currently in Afib (HR in 80s).  - d/c Entresto and coreg given hypotension

## 2019-11-07 NOTE — RAPID RESPONSE TEAM SUMMARY - NSSITUATIONBACKGROUNDRRT_GEN_ALL_CORE
This is a 86yr old male with PMHx of chronic systolic heart failure with PPM and recent upgrade to BiV, valvular heart disease (s/p mechanical aortic valve replacement in 1999 and MV repair in 2006) on coumadin, CAD s/p CABG/Stent BMS x 2 to LAD, afib, gout, HLD, HTN, BPH, pw dyspnea on exertion being treated for CHF. Pt also being treated for blood loss with PRBCs. RRT called for hypotension in SBP 60s. On arrival to RRT, pt in bed following commands. Pt actively receiving PRBC, started on 500cc fluid bolus. Pt's BS in 80s, gave an amp of D50 x 1. Pt's SBP improved to 100s, provider at bedside. Will continue to monitor.

## 2019-11-07 NOTE — PROGRESS NOTE ADULT - ATTENDING COMMENTS
I have seen and examined the patient with Mr. Bernard ( MARI) and resident Dr Lomas in AM.  Subsequently had rapid response secondary to hypotension with SBP of 60  PRBC transfusion today for symptomatic anemia   monitor BP closely   once BP stable IV Lasix as recommended by heart failure team  patient as full code   overall prognosis is poor  palliative care consult  monitor CBC closely   cardiology , haem and HF note appreciated

## 2019-11-07 NOTE — CONSULT NOTE ADULT - PROBLEM SELECTOR RECOMMENDATION 4
Met with Kenny at pts bedside.  Discussed advanced care planning for greater then 20 mins.  Pt has a living will that states he would not want aggressive intervention at the end of his life.  although they want to discuss code status as a family tonight [place] Pts [place] [facility]  they were attempting to wait until he arrived on Saturday but will speak sooner due to the pts instability.  I will meet again with family around 11 am tomorrow.

## 2019-11-07 NOTE — RAPID RESPONSE TEAM SUMMARY - NSSITUATIONBACKGROUNDRRT_GEN_ALL_CORE
85 YO M PMH of chronic systolic heart failure with PPM and recent upgrade to BiV, valvular heart disease (s/p mechanical aortic valve replacement in 1999 and MV repair in 2006) on coumadin, CAD s/p CABG/Stent BMS x 2 to LAD, afib, gout, HLD, HTN, BPH, pw dyspnea on exertion being treated for CHF. Pt being actively treated for blood loss with half a unit of pRBC. LAsix 60mg was given as per primary team while SBP was 90s.

## 2019-11-08 VITALS
DIASTOLIC BLOOD PRESSURE: 37 MMHG | TEMPERATURE: 98 F | SYSTOLIC BLOOD PRESSURE: 65 MMHG | OXYGEN SATURATION: 95 % | HEART RATE: 81 BPM | RESPIRATION RATE: 30 BRPM

## 2019-11-08 DIAGNOSIS — D63.8 ANEMIA IN OTHER CHRONIC DISEASES CLASSIFIED ELSEWHERE: ICD-10-CM

## 2019-11-08 DIAGNOSIS — R52 PAIN, UNSPECIFIED: ICD-10-CM

## 2019-11-08 DIAGNOSIS — R64 CACHEXIA: ICD-10-CM

## 2019-11-08 DIAGNOSIS — F41.9 ANXIETY DISORDER, UNSPECIFIED: ICD-10-CM

## 2019-11-08 LAB
ALBUMIN SERPL ELPH-MCNC: 1.9 G/DL — LOW (ref 3.3–5)
ALP SERPL-CCNC: 401 U/L — HIGH (ref 40–120)
ALT FLD-CCNC: 50 U/L — HIGH (ref 10–45)
ANION GAP SERPL CALC-SCNC: 12 MMOL/L — SIGNIFICANT CHANGE UP (ref 5–17)
AST SERPL-CCNC: 168 U/L — HIGH (ref 10–40)
BASOPHILS # BLD AUTO: 0 K/UL — SIGNIFICANT CHANGE UP (ref 0–0.2)
BASOPHILS NFR BLD AUTO: 0 % — SIGNIFICANT CHANGE UP (ref 0–2)
BILIRUB SERPL-MCNC: 3.1 MG/DL — HIGH (ref 0.2–1.2)
BLD GP AB SCN SERPL QL: NEGATIVE — SIGNIFICANT CHANGE UP
BUN SERPL-MCNC: 95 MG/DL — HIGH (ref 7–23)
CALCIUM SERPL-MCNC: 11.3 MG/DL — HIGH (ref 8.4–10.5)
CHLORIDE SERPL-SCNC: 100 MMOL/L — SIGNIFICANT CHANGE UP (ref 96–108)
CO2 SERPL-SCNC: 26 MMOL/L — SIGNIFICANT CHANGE UP (ref 22–31)
CREAT SERPL-MCNC: 1.54 MG/DL — HIGH (ref 0.5–1.3)
EOSINOPHIL # BLD AUTO: 0 K/UL — SIGNIFICANT CHANGE UP (ref 0–0.5)
EOSINOPHIL NFR BLD AUTO: 0 % — SIGNIFICANT CHANGE UP (ref 0–6)
GLUCOSE BLDC GLUCOMTR-MCNC: 116 MG/DL — HIGH (ref 70–99)
GLUCOSE BLDC GLUCOMTR-MCNC: 96 MG/DL — SIGNIFICANT CHANGE UP (ref 70–99)
GLUCOSE SERPL-MCNC: 102 MG/DL — HIGH (ref 70–99)
HCT VFR BLD CALC: 20.5 % — CRITICAL LOW (ref 39–50)
HCT VFR BLD CALC: 20.8 % — CRITICAL LOW (ref 39–50)
HGB BLD-MCNC: 6.7 G/DL — CRITICAL LOW (ref 13–17)
HGB BLD-MCNC: 6.8 G/DL — CRITICAL LOW (ref 13–17)
IMM GRANULOCYTES NFR BLD AUTO: 1.6 % — HIGH (ref 0–1.5)
LYMPHOCYTES # BLD AUTO: 0.14 K/UL — LOW (ref 1–3.3)
LYMPHOCYTES # BLD AUTO: 3.7 % — LOW (ref 13–44)
MAGNESIUM SERPL-MCNC: 2.2 MG/DL — SIGNIFICANT CHANGE UP (ref 1.6–2.6)
MCHC RBC-ENTMCNC: 27.6 PG — SIGNIFICANT CHANGE UP (ref 27–34)
MCHC RBC-ENTMCNC: 28 PG — SIGNIFICANT CHANGE UP (ref 27–34)
MCHC RBC-ENTMCNC: 32.7 GM/DL — SIGNIFICANT CHANGE UP (ref 32–36)
MCHC RBC-ENTMCNC: 32.7 GM/DL — SIGNIFICANT CHANGE UP (ref 32–36)
MCV RBC AUTO: 84.6 FL — SIGNIFICANT CHANGE UP (ref 80–100)
MCV RBC AUTO: 85.8 FL — SIGNIFICANT CHANGE UP (ref 80–100)
MONOCYTES # BLD AUTO: 0.56 K/UL — SIGNIFICANT CHANGE UP (ref 0–0.9)
MONOCYTES NFR BLD AUTO: 14.7 % — HIGH (ref 2–14)
NEUTROPHILS # BLD AUTO: 3.06 K/UL — SIGNIFICANT CHANGE UP (ref 1.8–7.4)
NEUTROPHILS NFR BLD AUTO: 80 % — HIGH (ref 43–77)
NRBC # BLD: 2 /100 WBCS — HIGH (ref 0–0)
NRBC # BLD: 3 /100 WBCS — HIGH (ref 0–0)
PHOSPHATE SERPL-MCNC: 3.7 MG/DL — SIGNIFICANT CHANGE UP (ref 2.5–4.5)
PLATELET # BLD AUTO: 18 K/UL — CRITICAL LOW (ref 150–400)
PLATELET # BLD AUTO: 58 K/UL — LOW (ref 150–400)
POTASSIUM SERPL-MCNC: 5.2 MMOL/L — SIGNIFICANT CHANGE UP (ref 3.5–5.3)
POTASSIUM SERPL-SCNC: 5.2 MMOL/L — SIGNIFICANT CHANGE UP (ref 3.5–5.3)
PROT SERPL-MCNC: 4.1 G/DL — LOW (ref 6–8.3)
RBC # BLD: 2.39 M/UL — LOW (ref 4.2–5.8)
RBC # BLD: 2.46 M/UL — LOW (ref 4.2–5.8)
RBC # FLD: 23.1 % — HIGH (ref 10.3–14.5)
RBC # FLD: 23.2 % — HIGH (ref 10.3–14.5)
RH IG SCN BLD-IMP: POSITIVE — SIGNIFICANT CHANGE UP
SODIUM SERPL-SCNC: 138 MMOL/L — SIGNIFICANT CHANGE UP (ref 135–145)
WBC # BLD: 3.47 K/UL — LOW (ref 3.8–10.5)
WBC # BLD: 3.82 K/UL — SIGNIFICANT CHANGE UP (ref 3.8–10.5)
WBC # FLD AUTO: 3.47 K/UL — LOW (ref 3.8–10.5)
WBC # FLD AUTO: 3.82 K/UL — SIGNIFICANT CHANGE UP (ref 3.8–10.5)

## 2019-11-08 PROCEDURE — 99497 ADVNCD CARE PLAN 30 MIN: CPT

## 2019-11-08 PROCEDURE — 99233 SBSQ HOSP IP/OBS HIGH 50: CPT

## 2019-11-08 PROCEDURE — 99233 SBSQ HOSP IP/OBS HIGH 50: CPT | Mod: GC

## 2019-11-08 RX ORDER — HYDROMORPHONE HYDROCHLORIDE 2 MG/ML
0.2 INJECTION INTRAMUSCULAR; INTRAVENOUS; SUBCUTANEOUS
Refills: 0 | Status: DISCONTINUED | OUTPATIENT
Start: 2019-11-08 | End: 2019-11-09

## 2019-11-08 RX ORDER — HYDROMORPHONE HYDROCHLORIDE 2 MG/ML
0.1 INJECTION INTRAMUSCULAR; INTRAVENOUS; SUBCUTANEOUS
Refills: 0 | Status: DISCONTINUED | OUTPATIENT
Start: 2019-11-08 | End: 2019-11-09

## 2019-11-08 RX ORDER — FUROSEMIDE 40 MG
40 TABLET ORAL ONCE
Refills: 0 | Status: DISCONTINUED | OUTPATIENT
Start: 2019-11-08 | End: 2019-11-08

## 2019-11-08 RX ORDER — ACETAMINOPHEN 500 MG
650 TABLET ORAL EVERY 6 HOURS
Refills: 0 | Status: DISCONTINUED | OUTPATIENT
Start: 2019-11-08 | End: 2019-11-08

## 2019-11-08 RX ORDER — ACETAMINOPHEN 500 MG
1000 TABLET ORAL EVERY 6 HOURS
Refills: 0 | Status: DISCONTINUED | OUTPATIENT
Start: 2019-11-08 | End: 2019-11-08

## 2019-11-08 RX ADMIN — PANTOPRAZOLE SODIUM 40 MILLIGRAM(S): 20 TABLET, DELAYED RELEASE ORAL at 20:26

## 2019-11-08 RX ADMIN — CEFTRIAXONE 100 MILLIGRAM(S): 500 INJECTION, POWDER, FOR SOLUTION INTRAMUSCULAR; INTRAVENOUS at 05:04

## 2019-11-08 RX ADMIN — PANTOPRAZOLE SODIUM 40 MILLIGRAM(S): 20 TABLET, DELAYED RELEASE ORAL at 05:04

## 2019-11-08 RX ADMIN — HYDROMORPHONE HYDROCHLORIDE 0.2 MILLIGRAM(S): 2 INJECTION INTRAMUSCULAR; INTRAVENOUS; SUBCUTANEOUS at 13:46

## 2019-11-08 RX ADMIN — HYDROMORPHONE HYDROCHLORIDE 0.2 MILLIGRAM(S): 2 INJECTION INTRAMUSCULAR; INTRAVENOUS; SUBCUTANEOUS at 13:16

## 2019-11-08 RX ADMIN — HYDROMORPHONE HYDROCHLORIDE 0.1 MILLIGRAM(S): 2 INJECTION INTRAMUSCULAR; INTRAVENOUS; SUBCUTANEOUS at 23:59

## 2019-11-08 NOTE — PROGRESS NOTE ADULT - ASSESSMENT
AG is a 86 year old man with a history of chronic systolic heart failure with recent upgrade to BiV, valvular heart disease (mechanical aortic, 1999; MV repair, 2006), on coumadin (INR goal 2.5 to 3), CAD with CABG and stents (BMSx2, LAD), chronic Afib, thrombocytopenia, gout, HLD, HTN, BPH.    He is presenting here for increased dyspnea on exertion with bilateral pleural effusions, abdominal ascites, and peripheral edema in the context of a history of chronic heart failure, valvular heart disease, and CAD. This is most consistent with an acute exacerbation of his chronic systolic heart failure (class IV, stage D). The patient has CT and laboratory evidence of cirrhosis, likely secondary to his CHF exacerbation, though autoimmune, metabolic, and infectious etiologies are being pursued. Given the steady and persistent drop in hemoglobin, the persistent weakness, and dark stools, there is a high suspicion for GI bleed. The patient has received numerous RBC and platelet transfusions in the hospital for persistent anemia and thrombocytopenia. The patient is receiving IV diureses preceding transfusions to avoid potential fluid overload (SOB, cough). Invasive procedures are being held at this point due to a low platelet count and elevated INR (goal is <2). Patient followed by palliative care. Current Goal of Care is to extend life until family arrives on 11/9. Patient is DNR/DNI.

## 2019-11-08 NOTE — DISCHARGE NOTE PROVIDER - NSDCMRMEDTOKEN_GEN_ALL_CORE_FT
allopurinol 100 mg oral tablet: 1 tab(s) orally once a day   atorvastatin 10 mg oral tablet: 1 tab(s) orally once a day   Coreg 6.25 mg oral tablet: 1 tab(s) orally 2 times a day  Entresto 24 mg-26 mg oral tablet: 1 tab(s) orally 2 times a day  finasteride 5 mg oral tablet: 1 tab(s) orally once a day  folic acid 0.4 mg oral tablet: 1 tab(s) orally once a day  potassium chloride 10 mEq oral capsule, extended release: 1 tab(s) orally once a day  torsemide 20 mg oral tablet: 1 tab(s) orally once a day, As Needed  Vitamin B-12 100 mcg oral tablet: 1 tab(s) orally once a day  Vitamin D3 5000 intl units oral tablet: 1 tab(s) orally once a day  warfarin 4 mg oral tablet: 1 tab(s) orally once a day

## 2019-11-08 NOTE — PROGRESS NOTE ADULT - PROBLEM SELECTOR PROBLEM 4
Cachexia
HTN (hypertension)

## 2019-11-08 NOTE — PROVIDER CONTACT NOTE (OTHER) - RECOMMENDATIONS
Continue to monitor for now as per Doctor request, Send post transfusion CBC now . Recheck BP before again before 3 am platelets.

## 2019-11-08 NOTE — SWALLOW BEDSIDE ASSESSMENT ADULT - SWALLOW EVAL: DIAGNOSIS
Chart reviewed, orders acknowledged. Chart reviewed. Orders Acknowledged. Discussed case with Team 4 who stated swallow evaluation is no longer necessary as pt with comfort measures. Re-consult Speech Therapy should change in status occur.

## 2019-11-08 NOTE — PROGRESS NOTE ADULT - PROBLEM SELECTOR PLAN 2
CT A/P revealing cirrhosis, found to have worsening thrombocytopenia and leukopenia, transaminitis with enlarged liver noted on exam. 30 year history of thrombocytopenia. Elevated alk phos and GGT and bilirubin, low albumin.   - monitor CMP daily  - holding invasive procedures due to low platelet count  - platelet transfusion if and only if needed for invasive procedure or count drops below 10  - advance diet to full/complete  - Follow hepatology recs.
CT A/P revealing cirrhosis, found to have worsening thrombocytopenia and leukopenia, transaminitis with enlarged liver noted on exam. 30 year history of thrombocytopenia. Elevated alk phos and GGT and bilirubin, low albumin.   - monitor CMP daily  - holding paracentesis for now due to low platelet count  - platelet transfusion if and only if needed for invasive procedure  - Follow hepatology recs.
likely secondary to MDS  appreciate Heme onc note  transfuse as necessary.  Family is awaiting family to arrive from [facility] and [place] on Friday and Saturday.
- Plan is to transfuse to hgb>8.   - Patient is on CTX for SBP prophylaxis and IV PPI bid.
CT A/P revealing cirrhosis, found to have worsening thrombocytopenia and leukopenia, transaminitis with enlarged liver noted on exam. 30 year history of thrombocytopenia. Elevated alk phos and GGT and bilirubin, low albumin.   - monitor CMP daily  - no indications for platelet transfusion at this moment  - GI consult

## 2019-11-08 NOTE — PROGRESS NOTE ADULT - PROBLEM SELECTOR PROBLEM 7
Atrial fibrillation
Pain
Atrial fibrillation

## 2019-11-08 NOTE — PROGRESS NOTE ADULT - PROBLEM SELECTOR PROBLEM 5
CAD (coronary artery disease)
Encounter for palliative care
CAD (coronary artery disease)

## 2019-11-08 NOTE — PROGRESS NOTE ADULT - ASSESSMENT
87 YO M PMH of chronic systolic heart failure with PPM. p/w sob.  c/b MDS and cardiac cirrhosis.  Palliative care called for GOC.

## 2019-11-08 NOTE — PROVIDER CONTACT NOTE (OTHER) - ACTION/TREATMENT ORDERED:
Will monitor patient and send CBC. Will monitor patient and send CBC. ***As per Doctor Kimura, no interventions if SBP 80 or above.

## 2019-11-08 NOTE — PROGRESS NOTE ADULT - PROBLEM SELECTOR PROBLEM 1
Acute on chronic combined systolic and diastolic congestive heart failure
Decompensated heart failure
Decompensated heart failure
Acute on chronic combined systolic and diastolic congestive heart failure
Decompensated heart failure

## 2019-11-08 NOTE — PROGRESS NOTE ADULT - SUBJECTIVE AND OBJECTIVE BOX
JOSUE SOLOMON is a 86year old man who has been here for 5day(s).    ***ONE-LINER***      ***INTERVAL HPI/OVERNIGHT EVENTS***  Overnight an RRT was called for a hypotensive episode with systolics to the 60s. He was given 500cc NS and his pressures responded appropriately, bringing him up to systolics of 100.  He then received another 1/2U of pRBCs as planned; his systolics were in the 80s for the rest of the night (close to his baseline). He recived a unit of platelets starting at 0300 and his AM CBC indicated a good response. The patient is scheduled to receive yet another 1/2U of blood and 1 U of platelets this afternoon. The patient is deteriorating clinically and both the patient and family are aware. Palliative care is on board. He was made DNR/DNI this morning. The GOC is to give time to for family members to say goodbye (arriving 11/9).    The patient complained of back pain this morning and afternoon, but otherwise states he feels "fine". Assessment was limited by lethargy.     T(F): 97.5, Max: 98.1 (05:08)  HR: 88 (80 - 97)  BP: 81/47 (78/34 - 105/58)  RR: 20 (18 - 21)  SpO2: 97% (95% - 100%)    ***PHYSICAL EXAM***  GENERAL: NAD, lying in bed. Uncomfortable. Difficult to rouse. Grimacing.   HEAD:  Atraumatic, Normocephalic  EYES: EOMI, PERRLA, conjunctiva and sclera clear  ENT: Moist mucous membranes; patent nares, uvula midline  NECK: Supple, No JVD, no lymphadenopathy  CHEST/LUNG: Clear to auscultation bilaterally; No rales, rhonchi, wheezing, or rubs. Unlabored respirations  HEART: Regular rate and rhythm; No murmurs, rubs, or gallops  ABDOMEN: Distended. Bowel sounds present; Soft, Nontender. No hepatomegaly  EXTREMITIES:  Trace edema to ankles bilaterally. 2+ Peripheral Pulses, brisk capillary refill. No clubbing, cyanosis.  NERVOUS SYSTEM:  Lethargic. Alert & Oriented X3, speech hoarse.  No deficits. CNII-XII grossly intact.  MSK: FROM all 4 extremities, full and equal strength  SKIN: No rashes or lesions      11-07-19 @ 07:01  -  11-08-19 @ 07:00  --------------------------------------------------------  IN: 425 mL / OUT: 0 mL / NET: 425 mL      ***LABORATORY STUDIES***                        6.7    3.82  )-----------( 58       ( 08 Nov 2019 08:15 )             20.5     11-08    138  |  100  |  95<H>  ---------------------------------------------<  102<H>  5.2   |  26  |  1.54<H>    Ca    11.3<H>      08 Nov 2019 06:42  Phos  3.7     11-08  Mg     2.2     11-08    TPro  4.1<L>  /  Alb  1.9<L>  /  TBili  3.1<H>  /  DBili  x   /  AST  168<H>  /  ALT  50<H>  /  AlkPhos  401<H>  11-08    ***IMAGING***  < from: CT Head No Cont (11.07.19 @ 16:24) >  FINDINGS:      The ventricles and sulci are more prominent on the current examination,   consistent with progressive age related atrophy. The ventricles may be   dilated slightly out of proportion to the sulci, suggesting the presence   of normal pressure hydrocephalus.    No mass effect, midline shift, vasogenic edema, acute intracranial   hemorrhage, or CT evidence of acute territorialinfarct. Mild white   matter microvascular ischemic disease.    The visualized paranasal sinuses and mastoid air cells are clear.    IMPRESSION:    No acute intracranial hemorrhage, mass effect, vasogenic edema, or   evidence of acute territorial infarct.    Ventricles and sulci more prominent on the current examination,   consistent with progressive age related atrophy.    Ventricles may be dilated slightly out of proportion to the sulci,   suggesting the presence of normal pressure hydrocephalus.    < end of copied text >    ***MEDICATIONS***  allopurinol 100 milliGRAM(s) Oral daily  Biotene Dry Mouth Oral Rinse 5 milliLiter(s) Swish and Spit three times a day PRN  cefTRIAXone   IVPB 1000 milliGRAM(s) IV Intermittent every 24 hours  finasteride 5 milliGRAM(s) Oral daily  furosemide   Injectable 40 milliGRAM(s) IV Push once PRN  HYDROmorphone  Injectable 0.2 milliGRAM(s) IV Push every 3 hours PRN  HYDROmorphone  Injectable 0.2 milliGRAM(s) IV Push every 3 hours PRN  LORazepam   Injectable 0.5 milliGRAM(s) IV Push every 6 hours PRN  pantoprazole  Injectable 40 milliGRAM(s) IV Push two times a day    HEALTH ISSUES   - PROBLEM Dx:  Anxiety: Anxiety  Pain: Pain  Cachexia: Cachexia  Anemia, chronic disease: Anemia, chronic disease  Functional quadriplegia: Functional quadriplegia  Encounter for palliative care: Encounter for palliative care  Advanced care planning/counseling discussion: Advanced care planning/counseling discussion  Cirrhosis of liver with ascites: Cirrhosis of liver with ascites  Acute blood loss anemia: Acute blood loss anemia  Decompensated heart failure: Decompensated heart failure  Preventive measure: Preventive measure  BPH (benign prostatic hyperplasia): BPH (benign prostatic hyperplasia)  Dyslipidemia: Dyslipidemia  Atrial fibrillation: Atrial fibrillation  Valvular heart disease: Valvular heart disease  CAD (coronary artery disease): CAD (coronary artery disease)  HTN (hypertension): HTN (hypertension)  Fecal occult blood test positive: Fecal occult blood test positive  Cardiac cirrhosis: Cardiac cirrhosis  Acute on chronic combined systolic and diastolic congestive heart failure: Acute on chronic combined systolic and diastolic congestive heart failure

## 2019-11-08 NOTE — PROGRESS NOTE ADULT - PROBLEM SELECTOR PLAN 5
- Lipitor 10mg qHS  - not taking aspirin or plavix
kps 30 %
- Lipitor 10mg qHS  - Coreg 6.25 mg PO BID  - not taking aspirin or plavix

## 2019-11-08 NOTE — PROGRESS NOTE ADULT - PROBLEM SELECTOR PLAN 8
- Lipitor 10mg qHS
ativan 0.5mg ivp q6h prn anxiety or agitation.
- Lipitor 10mg qHS

## 2019-11-08 NOTE — PROGRESS NOTE ADULT - PROBLEM SELECTOR PLAN 9
- c/w finasteride 5mg qD

## 2019-11-08 NOTE — DISCHARGE NOTE PROVIDER - NSDCHC_MEDRECSTATUS_GEN_ALL_CORE
Samples Given: Avar E lotion to be used to face bid
Detail Level: Simple
Admission Reconciliation is Completed  Discharge Reconciliation is Not Complete

## 2019-11-08 NOTE — PROGRESS NOTE ADULT - PROBLEM SELECTOR PLAN 6
Mechanical aortic valve, takes Coumadin 4mg nightly, current INR >3.  - hold coumadin  - use IV heparin for INR <2 (for possible EGD/paracentesis in the future)
Spoke with pt, Les at pts bedside.  Pt able to verbalize that he does not want CPR or to be on life support.  MOLST completed. (Initial MOLST reviewed and voided)  Plan for symptom management including pain, dyspnea and agitation management.  The families hope it that the pts son and great grandchild are able to get here to see the pt before he dies. plan above discussed with Team 4.
Mechanical aortic valve, takes Coumadin 4mg nightly, current INR >3.  - re-dose warfarin based on INR

## 2019-11-08 NOTE — PROGRESS NOTE ADULT - SUBJECTIVE AND OBJECTIVE BOX
SUBJECTIVE AND OBJECTIVE: pt resting in bed with family at bedside.  c/o of back pain   INTERVAL HPI/OVERNIGHT EVENTS: s/p RRT last dolores for hypotension    DNR on chart: Yes      Allergies  No Known Allergies    Intolerances    MEDICATIONS  (STANDING):  allopurinol 100 milliGRAM(s) Oral daily  cefTRIAXone   IVPB 1000 milliGRAM(s) IV Intermittent every 24 hours  finasteride 5 milliGRAM(s) Oral daily  pantoprazole  Injectable 40 milliGRAM(s) IV Push two times a day    MEDICATIONS  (PRN):  Biotene Dry Mouth Oral Rinse 5 milliLiter(s) Swish and Spit three times a day PRN Dry mouth  furosemide   Injectable 40 milliGRAM(s) IV Push once PRN Please give after 1/2U blood  HYDROmorphone  Injectable 0.2 milliGRAM(s) IV Push every 3 hours PRN Moderate Pain (4 - 6)  HYDROmorphone  Injectable 0.2 milliGRAM(s) IV Push every 3 hours PRN dyspnea  LORazepam   Injectable 0.5 milliGRAM(s) IV Push every 6 hours PRN agitation or anxiety    ITEMS UNCHECKED ARE NOT PRESENT    PRESENT SYMPTOMS: [ ]Unable to obtain due to poor mentation   Source if other than patient:  [ ]Family   [ ]Team     Pain:  [x ] yes [ ] no  QOL impact -  yes  Location -     back               Aggravating factors -   Quality - sorness, occ sharp  Radiation -  Timing- intermittent  Severity (0-10 scale):  Minimal acceptable level (0-10 scale):     Dyspnea:                           [x ]Mild [ ]Moderate [ ]Severe  Anxiety:                             [x ]Mild [ ]Moderate [ ]Severe  Fatigue:                             [ ]Mild [ ]Moderate [x ]Severe  Nausea:                             [ ]Mild [ ]Moderate [ ]Severe  Loss of appetite:              [ ]Mild [ ]Moderate [ x ]Severe  Constipation:                    [ ]Mild [ ]Moderate [ ]Severe    PAIN AD Score:	  [email or web address] (Ctrl + left click to view)    Other Symptoms:  [x ]All other review of systems negative     Karnofsky Performance Score/Palliative Performance Status Version 2:      30   %    http://npcrc.org/files/news/palliative_performance_scale_[email or web address].pdf  [email or web address].pdf    PHYSICAL EXAM:  Vital Signs Last 24 Hrs  T(C): 36.4 (08 Nov 2019 11:44), Max: 36.8 (07 Nov 2019 13:30)  T(F): 97.5 (08 Nov 2019 11:44), Max: 98.2 (07 Nov 2019 13:30)  HR: 80 (08 Nov 2019 11:44) (80 - 97)  BP: 79/48 (08 Nov 2019 11:44) (78/34 - 105/58)  BP(mean): --  RR: 21 (08 Nov 2019 11:44) (18 - 21)  SpO2: 95% (08 Nov 2019 11:44) (95% - 100%)     GENERAL:  [ ]Alert  [x ]Oriented x  3  [x ]Lethargic  [ x]Cachexia  [ ]Unarousable  [ x]Verbal  [ ]Non-Verbal    Behavioral:   [ x] Mild Anxiety  [ ] Delirium [ ] Agitation [ ] Other    HEENT:  [ ]Normal   [x ]Dry mouth   [ ]ET Tube/Trach  [ ]Oral lesions    PULMONARY:   [ ]Clear [ ]Tachypnea  [ ]Audible excessive secretions   [ ]Rhonchi        [ ]Right [ ]Left [ ]Bilateral  [x ]Crackles        [ ]Right [ ]Left [ ]Bilateral  [ ]Wheezing     [ ]Right [ ]Left [ ]Bilateral    CARDIOVASCULAR:    [x ]Regular [ ]Irregular [ ]Tachy  [ ]Marcos [ ]Murmur [ ]Other    GASTROINTESTINAL:  [x ]Soft  [ ]Distended   [x ]+BS  [ ]Non tender [ ]Tender  [ ]PEG [ ]OGT/ NGT   Last BM:     GENITOURINARY:  [ ]Normal [x ] Incontinent   [ ]Oliguria/Anuria   [ ]Cutler    MUSCULOSKELETAL:   [ ]Normal   [x ]Weakness  [ x]Bed/Wheelchair bound [ ]Edema    NEUROLOGIC:   [ x]No focal deficits  [ ] Cognitive impairment  [ ] Dysphagia [ ]Dysarthria [ ] Paresis [ ]Other     SKIN:   [x ]Normal   [ ]Pressure ulcer(s)  [ ]Rash    CRITICAL CARE:  [ ] Shock Present  [ ] Septic [ ]Cardiogenic [ ]Neurologic [ ]Hypovolemic  [ ]  Vasopressors [ ]  Inotropes  [ ] Respiratory failure present [ ] Mechanical Ventilation [ ] Non-invasive ventilatory support [ ] High-Flow  [ ] Acute  [ ] Chronic [ ] Hypoxic  [ ] Hypercarbic [ ] Other  [ ] Other organ failure     LABS:                        6.7    3.82  )-----------( 58       ( 08 Nov 2019 08:15 )             20.5   11-08    138  |  100  |  95<H>  ----------------------------<  102<H>  5.2   |  26  |  1.54<H>    Ca    11.3<H>      08 Nov 2019 06:42  Phos  3.7     11-08  Mg     2.2     11-08    TPro  4.1<L>  /  Alb  1.9<L>  /  TBili  3.1<H>  /  DBili  x   /  AST  168<H>  /  ALT  50<H>  /  AlkPhos  401<H>  11-08  PT/INR - ( 07 Nov 2019 10:10 )   PT: 44.2 sec;   INR: 3.75 ratio         PTT - ( 07 Nov 2019 10:10 )  PTT:52.0 sec      RADIOLOGY & ADDITIONAL STUDIES:    Protein Calorie Malnutrition Present: [ ] yes [x ] no  [ x] PPSV2 < or = 30%  [ ] significant weight loss [ ] poor nutritional intake [ ] anasarca [ ] catabolic state Albumin, Serum: 1.9 g/dL (11-08-19 @ 06:42)  Artificial Nutrition [ ]     REFERRALS:   [ ]Chaplaincy  [ ] Hospice  [ ]Child Life  [ ]Social Work  [ ]Case management [ ]Holistic Therapy     Goals of Care Document:    Care Coordination Assessment [C. Provider] (11-04-19 @ 13:59)   Progress Notes - Care Coordination [C. Provider] (11-08-19 @ 10:47)

## 2019-11-08 NOTE — PROGRESS NOTE ADULT - ATTENDING COMMENTS
I have seen and examined the patient with  Marco Antonio ( ) and resident Dr Lomas in AM. Patient is lethargic but arousable and indicated that he doesn't want any chest compression wanted DNR. Wife at bedside   palliative care note appreciated   DNR   PRBC   Diuresis as needed, monitor BMP and CBC

## 2019-11-08 NOTE — PROGRESS NOTE ADULT - PROBLEM SELECTOR PROBLEM 3
Fecal occult blood test positive
Functional quadriplegia
Cirrhosis of liver with ascites
Fecal occult blood test positive

## 2019-11-08 NOTE — CHART NOTE - NSCHARTNOTEFT_GEN_A_CORE
Spoke with patient's family yesterday who did not want aggressive measures, chest compressions or intubation, for pt in the case of cardiac arrest. However, wanted to wait to sign MOLST until son of pt arrives from [facility] on Saturday. This morning, spoke with next of kin, spouse Kirstin Blake, who verbalized she would not want aggressive measures, but wants to wait to sign MOLST until daughter arrives at around 8:45am this morning. Spoke with patient's family yesterday who did not want aggressive measures, chest compressions or intubation, for pt in the case of cardiac arrest. However, wanted to wait to sign MOLST until son of pt arrives from [facility] on Saturday. This morning, spoke with next of kin, spouse Kirstin Blake, who verbalized she would not want aggressive measures, but wants to wait to sign MOLST until daughter arrives at around 8:45am this morning.    Addendum: MOLST completed and in chart. Medically managing anemia and CHF with focus on comfort. Remainder of family to arrive today and tomorrow.    Roberto Lomas PGY2  Medicine Team 4

## 2019-11-08 NOTE — PROGRESS NOTE ADULT - PROBLEM SELECTOR PLAN 10
# Gout- c/w Allopurinol 100mg    Diet- Low sodium  DVT- Holding coumadin, on SCDs

## 2019-11-08 NOTE — PROVIDER CONTACT NOTE (OTHER) - ASSESSMENT
Post transfusion BP 85/44 (electronic) and 78/34 (manual). Patient more alert and responsive than before the rapid earlier in shift.

## 2019-11-08 NOTE — PROGRESS NOTE ADULT - SUBJECTIVE AND OBJECTIVE BOX
Despite transfusion yesterday with blood and platelets, H/H and plt count today are lower.  Patient is extremely lethargic, albumin is 1.9 and patient is not eating.      Vital Signs Last 24 Hrs  T(C): 36.7 (08 Nov 2019 05:08), Max: 36.8 (07 Nov 2019 13:30)  T(F): 98.1 (08 Nov 2019 05:08), Max: 98.2 (07 Nov 2019 13:30)  HR: 87 (08 Nov 2019 05:08) (80 - 97)  BP: 89/51 (08 Nov 2019 05:08) (78/34 - 105/58)  BP(mean): --  RR: 18 (08 Nov 2019 05:08) (18 - 20)  SpO2: 100% (08 Nov 2019 05:08) (97% - 100%)  Daily     Daily   I&O's Summary    07 Nov 2019 07:01  -  08 Nov 2019 07:00  --------------------------------------------------------  IN: 425 mL / OUT: 0 mL / NET: 425 mL        Neck: no bruits, JVD, adenopathy  Chest: clear  Cor: [id number], RR, normal prosthetic S1S2 with no III/VI MR murmr and no rght  Abd: soft, nontender, BS+ and no HSM  Ext: w/o CCE  Pulses:2+->dp bilaterally    MEDICATIONS  (STANDING):  allopurinol 100 milliGRAM(s) Oral daily  cefTRIAXone   IVPB 1000 milliGRAM(s) IV Intermittent every 24 hours  cholecalciferol 5000 Unit(s) Oral daily  cyanocobalamin 100 MICROGram(s) 100 MICROGram(s) Oral daily  finasteride 5 milliGRAM(s) Oral daily  folic acid 0.5 milliGRAM(s) Oral daily  pantoprazole  Injectable 40 milliGRAM(s) IV Push two times a day    MEDICATIONS  (PRN):  acetaminophen   Tablet .. 650 milliGRAM(s) Oral every 6 hours PRN Mild Pain (1 - 3), Moderate Pain (4 - 6)  Biotene Dry Mouth Oral Rinse 5 milliLiter(s) Swish and Spit three times a day PRN Dry mouth      11-07    137  |  98  |  90<H>  ----------------------------<  88  5.0   |  26  |  1.54<H>    Ca    10.8<H>      07 Nov 2019 20:29  Phos  3.6     11-07  Mg     2.2     11-07    TPro  3.9<L>  /  Alb  1.9<L>  /  TBili  2.9<H>  /  DBili  x   /  AST  152<H>  /  ALT  46<H>  /  AlkPhos  417<H>  11-07                          6.8    3.47  )-----------( 18       ( 08 Nov 2019 01:42 )             20.8     PT/INR - ( 07 Nov 2019 10:10 )   PT: 44.2 sec;   INR: 3.75 ratio         PTT - ( 07 Nov 2019 10:10 )  PTT:52.0 sec    HEALTH ISSUES - PROBLEM Dx:  Decompensated heart failure: patient does not seem to be diuresing adequately.  Consider switch to Bumex.  Despite low yield, consider trial of inotrope  Atrial fibrillation: still with INR>3 (probably secondary to liver failure)  CAD (coronary artery disease): No evidence of active ischemic disease  MDS: family members [place] coming in from [place] of the [place] and are expected tomorrow.  Would transfuse as aggressively as possible (blood, platelets and FFP) to improve hemodynamics (with diuretics as necessary  Acute on chronic combined systolic and diastolic congestive heart failure: continue diuresis and transfuse to Hgb>>=8; physical therapy;   prognosis is poor.  If above does not result in some improvement, family would consider taking patient home-would begin plans for home hospice (tomorrow or Sunday if aforementioned is ineffective.

## 2019-11-08 NOTE — PROGRESS NOTE ADULT - PROBLEM SELECTOR PLAN 4
- discontinue Entresto and Coreg in the setting of multifactorial hypotension
secondary to chronic disease inc CHF and MDS
- C/w entresto, Coreg

## 2019-11-08 NOTE — PROVIDER CONTACT NOTE (CRITICAL VALUE NOTIFICATION) - BACKGROUND
86YOM p/w dsypnea on exertion found to have b/l pleural effusion. Dx: acute on chronic combined systolic and diastolic chf. Dx: HTN, CAD, HLD, Afib, CABG

## 2019-11-08 NOTE — PROGRESS NOTE ADULT - PROBLEM SELECTOR PLAN 3
Positive FOBT, unclear why. Patient not taking NSAIDs, aspirin however has worsening thrombocytopenia. Stools have been dark for several weeks. Hb stabilizing, around 7.2. RBC Transfusions: 1U 11/5, 1/2U 11/6, 1/2 & 1/2 11/7, and 1/2 11/8. Platelet Transfusions: 1U 11/8. FFP Transfusions: 1U 11/8.  - holding Endoscopy and other invasive procedures for low platelets and elevated INR at this point.  - continue to monitor CBCs  - continue to watch for signs of bleeding (black stools, worsening fatigue)  - follow GI recs
Positive FOBT, unclear why. Patient not taking NSAIDs, aspirin however has worsening thrombocytopenia. Stools have been dark for several weeks. Hb stabilizing, around 7.2. Transfusion performed on 11/5, another planned for 11/6  - holding Endoscopy and other invasive procedures for low platelets and elevated INR at this point.  - continue to monitor CBCs  - continue to watch for signs of bleeding (black stools, worsening fatigue)  - follow GI recs
Positive FOBT, unclear why. Patient not taking NSAIDs, aspirin however has worsening thrombocytopenia. Stools have been dark for several weeks. Hb stabilizing, around 7.2. Transfusion performed on 11/5, another planned for 11/6  - holding Endoscopy and other invasive procedures for low platelets and elevated INR at this point.  - continue to monitor CBCs  - continue to watch for signs of bleeding (black stools, worsening fatigue)  - follow GI recs
Positive FOBT, unclear why. Patient not taking NSAIDs, aspirin however has worsening thrombocytopenia. Stools have been dark for several weeks. Hb stabilizing, around 7.2. Transfusion planned for 11/5.  - holding Endoscopy for low platelets at this point.  - continue to monitor CBCs  - continue to watch for signs of bleeding (black stools, worsening fatigue)  - follow GI recs
Pt requiring full assist with all ADL's  c/w full supportive care
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  - LFTs remain elevated, but stable. INR remains elevated despite holding coumadin.  - Hepatology following.  - F/u GGT  - Continue diuretics as above.
Positive FOBT, unclear why. Patient not taking NSAIDs, aspirin however has worsening thrombocytopenia. Stools have been dark for several weeks.   - transfuse for Hb<8  - Type and screen  - GI consult

## 2019-11-08 NOTE — CHART NOTE - NSCHARTNOTEFT_GEN_A_CORE
Hematology consulted to evaluate the pancytopenia. Smear and blood work concerning for MDS. This was discussed with the patient and family. Further work up was declined and patient electing for focus on symptom control/quality of life. Will sign off at this time. Please page with any questions or concerns    Soha Valadez MD  Hematology/Oncology Fellow  Pager: [phone]

## 2019-11-08 NOTE — PROGRESS NOTE ADULT - PROBLEM SELECTOR PROBLEM 6
Advanced care planning/counseling discussion
Valvular heart disease

## 2019-11-08 NOTE — PROGRESS NOTE ADULT - PROBLEM SELECTOR PROBLEM 2
Anemia, chronic disease
Cardiac cirrhosis
Acute blood loss anemia
Cardiac cirrhosis

## 2019-11-08 NOTE — CHART NOTE - NSCHARTNOTEFT_GEN_A_CORE
RRT called for hypotension earlier this evening. Pt received 500 cc NS with good response, SBP 70s >> 100s. Pt is now s/p 1 unit pRBCs. Post-transfusion CBC notable for Hgb 6.8. All cell lines low. I believe this to be dilutional. No bleeding episodes. Repeat vitals showed SBPs in 80s, pt appeared more alert than prior. Will hold off on further fluid resuscitation at this time. Platelet transfusion underway. Will obtain post-transfusion CBC and address need for further blood products at that time.       Nikki Kimura, PGY-1

## 2019-11-09 PROCEDURE — 86376 MICROSOMAL ANTIBODY EACH: CPT

## 2019-11-09 PROCEDURE — 85610 PROTHROMBIN TIME: CPT

## 2019-11-09 PROCEDURE — 82784 ASSAY IGA/IGD/IGG/IGM EACH: CPT

## 2019-11-09 PROCEDURE — 36430 TRANSFUSION BLD/BLD COMPNT: CPT

## 2019-11-09 PROCEDURE — 82140 ASSAY OF AMMONIA: CPT

## 2019-11-09 PROCEDURE — 83615 LACTATE (LD) (LDH) ENZYME: CPT

## 2019-11-09 PROCEDURE — 83010 ASSAY OF HAPTOGLOBIN QUANT: CPT

## 2019-11-09 PROCEDURE — 82746 ASSAY OF FOLIC ACID SERUM: CPT

## 2019-11-09 PROCEDURE — 94640 AIRWAY INHALATION TREATMENT: CPT

## 2019-11-09 PROCEDURE — 86255 FLUORESCENT ANTIBODY SCREEN: CPT

## 2019-11-09 PROCEDURE — 85027 COMPLETE CBC AUTOMATED: CPT

## 2019-11-09 PROCEDURE — 93308 TTE F-UP OR LMTD: CPT

## 2019-11-09 PROCEDURE — 81332 SERPINA1 GENE: CPT

## 2019-11-09 PROCEDURE — 82977 ASSAY OF GGT: CPT

## 2019-11-09 PROCEDURE — 74150 CT ABDOMEN W/O CONTRAST: CPT

## 2019-11-09 PROCEDURE — P9037: CPT

## 2019-11-09 PROCEDURE — 82607 VITAMIN B-12: CPT

## 2019-11-09 PROCEDURE — 80048 BASIC METABOLIC PNL TOTAL CA: CPT

## 2019-11-09 PROCEDURE — 85045 AUTOMATED RETICULOCYTE COUNT: CPT

## 2019-11-09 PROCEDURE — 97110 THERAPEUTIC EXERCISES: CPT

## 2019-11-09 PROCEDURE — 74176 CT ABD & PELVIS W/O CONTRAST: CPT

## 2019-11-09 PROCEDURE — 80074 ACUTE HEPATITIS PANEL: CPT

## 2019-11-09 PROCEDURE — 96374 THER/PROPH/DIAG INJ IV PUSH: CPT

## 2019-11-09 PROCEDURE — 86900 BLOOD TYPING SEROLOGIC ABO: CPT

## 2019-11-09 PROCEDURE — 86901 BLOOD TYPING SEROLOGIC RH(D): CPT

## 2019-11-09 PROCEDURE — 82390 ASSAY OF CERULOPLASMIN: CPT

## 2019-11-09 PROCEDURE — 85730 THROMBOPLASTIN TIME PARTIAL: CPT

## 2019-11-09 PROCEDURE — 83880 ASSAY OF NATRIURETIC PEPTIDE: CPT

## 2019-11-09 PROCEDURE — 84100 ASSAY OF PHOSPHORUS: CPT

## 2019-11-09 PROCEDURE — 70450 CT HEAD/BRAIN W/O DYE: CPT

## 2019-11-09 PROCEDURE — 86850 RBC ANTIBODY SCREEN: CPT

## 2019-11-09 PROCEDURE — 83550 IRON BINDING TEST: CPT

## 2019-11-09 PROCEDURE — P9011: CPT

## 2019-11-09 PROCEDURE — 97162 PT EVAL MOD COMPLEX 30 MIN: CPT

## 2019-11-09 PROCEDURE — 93306 TTE W/DOPPLER COMPLETE: CPT

## 2019-11-09 PROCEDURE — 83735 ASSAY OF MAGNESIUM: CPT

## 2019-11-09 PROCEDURE — 86038 ANTINUCLEAR ANTIBODIES: CPT

## 2019-11-09 PROCEDURE — P9059: CPT

## 2019-11-09 PROCEDURE — 82962 GLUCOSE BLOOD TEST: CPT

## 2019-11-09 PROCEDURE — 82272 OCCULT BLD FECES 1-3 TESTS: CPT

## 2019-11-09 PROCEDURE — 86923 COMPATIBILITY TEST ELECTRIC: CPT

## 2019-11-09 PROCEDURE — 99285 EMERGENCY DEPT VISIT HI MDM: CPT | Mod: 25

## 2019-11-09 PROCEDURE — 94660 CPAP INITIATION&MGMT: CPT

## 2019-11-09 PROCEDURE — 71045 X-RAY EXAM CHEST 1 VIEW: CPT

## 2019-11-09 PROCEDURE — 82728 ASSAY OF FERRITIN: CPT

## 2019-11-09 PROCEDURE — 76705 ECHO EXAM OF ABDOMEN: CPT

## 2019-11-09 PROCEDURE — 80053 COMPREHEN METABOLIC PANEL: CPT

## 2019-11-09 PROCEDURE — 86381 MITOCHONDRIAL ANTIBODY EACH: CPT

## 2019-11-09 PROCEDURE — P9016: CPT

## 2019-11-09 PROCEDURE — 83540 ASSAY OF IRON: CPT

## 2019-11-09 RX ORDER — HYDROMORPHONE HYDROCHLORIDE 2 MG/ML
0.1 INJECTION INTRAMUSCULAR; INTRAVENOUS; SUBCUTANEOUS
Refills: 0 | Status: DISCONTINUED | OUTPATIENT
Start: 2019-11-09 | End: 2019-11-09

## 2019-11-09 RX ADMIN — HYDROMORPHONE HYDROCHLORIDE 0.1 MILLIGRAM(S): 2 INJECTION INTRAMUSCULAR; INTRAVENOUS; SUBCUTANEOUS at 01:33

## 2019-11-09 NOTE — CHART NOTE - NSCHARTNOTEFT_GEN_A_CORE
MEDICINE NOTE    Called to bedside to evaluate the patient for lack of spontaneous breathing.     On physical exam, patient did not respond to verbal or noxious stimuli.  No spontaneous respirations.  Absent heart and breath sounds.  Absent radial and carotid pulses.   Pupils are fixed and dilated, no corneal reflex.  EKG rhythm strip shows asystole.   Patient pronounced dead at 2:07 AM.  Family declined autopsy.    Nikki Kimura, PGY-1

## 2019-11-09 NOTE — DISCHARGE NOTE FOR THE EXPIRED PATIENT - HOSPITAL COURSE
85 YO M PMH of chronic systolic heart failure with PPM and recent upgrade to BiV, valvular heart disease (s/p mechanical aortic valve replacement in  and MV repair in ) on coumadin INR goal 2.5 to 3, CAD s/p CABG/Stent BMS x 2 to LAD chronic atrial fibrillation, gout, HLD, HTN, BPH, presenting with dyspnea on exertion. Patient states he started developing shortness of breath a couple of days ago worse with exertion (walking). He says that at baseline he is able to walk 2,000-3,000 steps daily without limitations. Sleeps with 1 pillow at night as well as 1 pillow under his legs. Reports recent dry cough but no chest pain. Also reports abdominal bloating recently. No fevers, chills, recent illness, or travel history. Reports good compliance with his medications. Recently saw his Cardiologist who switched Lasix to Torsemide. He says he has felt weak and tired overall since his PPM was upgraded to a BiV device in September. He denies any history of bleeding disorders or Liver disease but does report having darker than usual stools recently. Had a colonoscopy done years ago which was normal.    97.8 F, HR 75, BP 92/54, RR 20, SpO2 99% on 4L of NC. Was first on Bipap initially.  Given 80mg IVP Lasix x1.    Pt was brought to the floors and was evaluated by the heart failure team. He was continued with diuresis 40mg BID, but systolic blood pressures were lowering to 80s-90s, and the following day was initially decreased to 40mg qd. He was evaluated by GI for his melena and drop in hemoglobin from baseline to around 7-8. His hyponatremia on admission. However, it was apparent pt had new-onset cirrhosis, most likely congestive in etiology 2/2 constrictive CHF. Heart failure recommended continuing diuresis with lasix 40mg TID with hold parameters of systolic <85 given worsening transaminitis despite diuresis. CXR during hospitalization revealed loculated R pleural effusion. TTE showed EF 50-60. Ascites was also present, but due to pt's pancytopenia with platelets to ~20, no paracentesis was performed and he was continued on CTX for empiric SBP coverage. Endoscopy was deferred as well 2/2 thrombocytopenia. His INR remained >3 during hospitalization and warfarin was held. His hgb goal was made >8 and first unit of PRBC infused on . Pt became acutely SOB and coughed at end of transfusion which was alleviated with lasix 40mg IVP. Pt's transaminitis continued to worsen despite diuresis, and his regimen was planned to be increased to lasix 60mg TID. However, he received one dose of lasix 60mg and became acutely hypotensive to SBP 60s for which an RRT was called. He was given 250cc bolus and blood transfusion for repletion. During this rapid, discussions were had with family who verbalized they would not want pt to be on pressors to transferred to CCU/ICU setting and to focus on non-aggressive measures. Pt was also evaluated by hematology, who thought that pancytopenia was due to MDS. Pt's family declined further workup which would include a bone marrow bx. Throughout this process pt had been becoming much more lethargic, sleeping most of the day, but remained oriented. Palliative was consulted, and GOC discussions were initiated. He finished another unit of PRBCs and 1U PLTs. overnight . Lasix was held following the RRT to prevent further drop in BP. After transfusion of total 2U PRBCs, his hgb continued to decrease (6.7 from 7.1). PLTs increased from 23 to 58. Rapid was called overnight  for another drop in SBP to low 70s for which he was given a 500cc bolus and blood transfusion was continued.  On , discussions with family led towards comfort care. He was still receiving blood/PLT transfusions since family wanted to have medical management until remainder of family arrives to see pt given his guarded prognosis. Symptoms management begun with dilaudid for pain and dyspnea. Pt was able to verbalize that he would not want chest compressions or intubation in the case of cardiac arrest/respiratory failure.  On  pt with agonal breathing.  at 2:07 am.

## 2020-10-01 NOTE — PROVIDER CONTACT NOTE (CHANGE IN STATUS NOTIFICATION) - SITUATION
no rashes , no suspicious lesions , no areas of discoloration , no jaundice present , good turgor , no masses , no tenderness on palpation Pt BP 62/31 during transfusion of PRBC (1/2 unit). Pt already received 250ml bolus at start of blood transfusion.

## 2020-10-14 NOTE — PATIENT PROFILE ADULT - NSPRESCRALCSIXMORE_GEN_A_NUR
Unna Boot Text: An Unna boot was placed to help immobilize the limb and facilitate more rapid healing. Never

## 2021-06-21 NOTE — H&P ADULT - NSICDXPASTMEDICALHX_GEN_ALL_CORE_FT
[Follow - Up] : a follow-up visit [Pituitary Evaluation/ Disorder] : pituitary evaluation/disorder PAST MEDICAL HISTORY:  Atrial fibrillation     BPH (benign prostatic hyperplasia)     CAD (coronary atherosclerotic disease) s/p CABG/Stent BMS x 2 to LAD     Dyslipidemia     HTN (hypertension)

## 2022-04-12 NOTE — PROGRESS NOTE ADULT - ASSESSMENT
This visual field clearly demonstrated a minimum of 55% loss of upper field of vision OU, with upper lid skin in repose and elevated by taping of the lid to demonstrate potential correction. This field shows that taping the lids significantly improved this patient's superior field of vision by approximately 22%, OU. 87 yo man PMH of chronic systolic heart failure with PPM and recent upgrade to BiV, valvular heart disease (s/p mechanical aortic valve replacement in 1999 and MV repair in 2006) on coumadin, CAD s/p CABG/Stent BMS x 2 to LAD, chronic atrial fibrillation, HLD, HTN, BPH, Gout presenting with SOB, admitted for decompensated CHF, with course c/b GI bleed and labs notable for pancytopenia.

## 2023-06-19 NOTE — PROGRESS NOTE ADULT - SUBJECTIVE AND OBJECTIVE BOX
Patient lethargic today.  He had 2  runs of NSVT last night (6 beats).  He was given Lopressore 12.5 mg.  Today, his BP is somewhat lower.  I spoke with Dr. Altamirano this morning.  I agree with recommendation for increasing Lasix to 60 mg. IV tid to relieve venous congestion in the liver (-neuro changes may be related to cirrhosis).  Heme input appreciated.  Would transfuse prn to keep Hgb>8.    Vital Signs Last 24 Hrs  T(C): 36.7 (2019 03:52), Max: 36.7 (2019 14:35)  T(F): 98.1 (2019 03:52), Max: 98.1 (2019 14:35)  HR: 80 (2019 03:52) (80 - 100)  BP: 88/58 (2019 06:14) (80/40 - 120/63)  BP(mean): --  RR: 18 (2019 04:02) (18 - 20)  SpO2: 98% (2019 03:52) (82% - 98%)  Daily     Daily Weight in k.1 (2019 12:57)  I&O's Summary    2019 07:01  -  2019 07:00  --------------------------------------------------------  IN: 665 mL / OUT: 200 mL / NET: 465 mL        Neck: no bruits, JVD, adenopathy  Chest: clear  Cor: [id number], IRIR, normal S1 loud P2 with no mrght  Abd: soft, nontender, BS+ and no HSM  Ext: w/o CCE  Pulses:2+->dp bilaterally    MEDICATIONS  (STANDING):  allopurinol 100 milliGRAM(s) Oral daily  cefTRIAXone   IVPB 1000 milliGRAM(s) IV Intermittent every 24 hours  cholecalciferol 5000 Unit(s) Oral daily  cyanocobalamin 100 MICROGram(s) 100 MICROGram(s) Oral daily  finasteride 5 milliGRAM(s) Oral daily  folic acid 0.5 milliGRAM(s) Oral daily  furosemide   Injectable 40 milliGRAM(s) IV Push every 12 hours  pantoprazole  Injectable 40 milliGRAM(s) IV Push two times a day    MEDICATIONS  (PRN):  acetaminophen   Tablet .. 650 milliGRAM(s) Oral every 6 hours PRN Mild Pain (1 - 3), Moderate Pain (4 - 6)  Biotene Dry Mouth Oral Rinse 5 milliLiter(s) Swish and Spit three times a day PRN Dry mouth          132<L>  |  94<L>  |  60<H>  ----------------------------<  91  5.1   |  27  |  1.00    Ca    10.7<H>      2019 21:36  Phos  3.9       Mg     2.1         TPro  4.8<L>  /  Alb  2.4<L>  /  TBili  3.5<H>  /  DBili  x   /  AST  185<H>  /  ALT  52<H>  /  AlkPhos  530<H>                            8.2    3.63  )-----------( 27       ( 2019 20:05 )             25.8     PT/INR - ( 2019 08:49 )   PT: 38.3 sec;   INR: 3.24 ratio         PTT - ( 2019 08:49 )  PTT:51.8 sec    HEALTH ISSUES - PROBLEM Dx:  Decompensated heart failure: As above-increase Lasix to 60 mg. IV tid  Atrial fibrillation: heparin for now  CAD (coronary artery disease): No evidence of active ischemic disease  MDS: transfuse prn  Acute on chronic combined systolic and diastolic congestive heart failure: continue diuresis and transfuse to Hgb>>=8; physical therapy;   prognosis is poor.  I discussed the situation with the family. Loose
